# Patient Record
Sex: MALE | Race: WHITE | NOT HISPANIC OR LATINO | Employment: OTHER | URBAN - METROPOLITAN AREA
[De-identification: names, ages, dates, MRNs, and addresses within clinical notes are randomized per-mention and may not be internally consistent; named-entity substitution may affect disease eponyms.]

---

## 2017-04-20 ENCOUNTER — OFFICE VISIT (OUTPATIENT)
Dept: URGENT CARE | Facility: CLINIC | Age: 77
End: 2017-04-20
Payer: MEDICARE

## 2017-04-20 PROCEDURE — G0463 HOSPITAL OUTPT CLINIC VISIT: HCPCS

## 2017-04-20 PROCEDURE — 99213 OFFICE O/P EST LOW 20 MIN: CPT

## 2017-05-18 ENCOUNTER — ALLSCRIPTS OFFICE VISIT (OUTPATIENT)
Dept: OTHER | Facility: OTHER | Age: 77
End: 2017-05-18

## 2017-07-05 ENCOUNTER — ALLSCRIPTS OFFICE VISIT (OUTPATIENT)
Dept: OTHER | Facility: OTHER | Age: 77
End: 2017-07-05

## 2017-07-11 ENCOUNTER — GENERIC CONVERSION - ENCOUNTER (OUTPATIENT)
Dept: OTHER | Facility: OTHER | Age: 77
End: 2017-07-11

## 2017-07-12 ENCOUNTER — ALLSCRIPTS OFFICE VISIT (OUTPATIENT)
Dept: OTHER | Facility: OTHER | Age: 77
End: 2017-07-12

## 2017-11-17 ENCOUNTER — ALLSCRIPTS OFFICE VISIT (OUTPATIENT)
Dept: OTHER | Facility: OTHER | Age: 77
End: 2017-11-17

## 2017-11-17 DIAGNOSIS — E78.5 HYPERLIPIDEMIA: ICD-10-CM

## 2017-11-17 DIAGNOSIS — Z12.5 ENCOUNTER FOR SCREENING FOR MALIGNANT NEOPLASM OF PROSTATE: ICD-10-CM

## 2017-11-17 DIAGNOSIS — I10 ESSENTIAL (PRIMARY) HYPERTENSION: ICD-10-CM

## 2017-11-19 NOTE — PROGRESS NOTES
Assessment    1  Hypertension (401 9) (I10)   2  Generalized osteoarthritis (715 00) (M15 9)   3  Hyperlipidemia (272 4) (E78 5)    Plan  Encounter for prostate cancer screening    · (1) PSA (SCREEN) (Dx V76 44 Screen for Prostate Cancer); Status:Active; Requestedfor:17Nov2017;    · (1) URINALYSIS w URINE C/S REFLEX (will reflex a microscopy if leukocytes, occultblood, or nitrites are not within normal limits); Status:Active; Requested for:17Nov2017;   Encounter for prostate cancer screening, Hyperlipidemia, Hypertension    · (1) CBC/PLT/DIFF; Status:Active; Requested for:17Nov2017;   Generalized osteoarthritis    · Diclofenac Sodium 1 % Transdermal Gel; apply sparingly to affected area(s)twice daily  Hyperlipidemia    · (1) COMPREHENSIVE METABOLIC PANEL; Status:Active; Requested for:17Nov2017;    · (1) LIPID PANEL, FASTING; Status:Active; Requested for:17Nov2017;     Discussion/Summary    Labs orderedmedsin 6 months or as needed  Chief Complaint  follow up for HTN, hyperlipidemia  Patient is here today for follow up of chronic conditions described in HPI  History of Present Illness  here for checkupacute complaintsrefillslabs      Review of Systems   Constitutional: No fever or chills, feels well, no tiredness, no recent weight gain or weight loss  Eyes: No complaints of eye pain, no red eyes, no discharge from eyes, no itchy eyes  ENT: no complaints of earache, no hearing loss, no nosebleeds, no nasal discharge, no sore throat, no hoarseness  Cardiovascular: No complaints of slow heart rate, no fast heart rate, no chest pain, no palpitations, no leg claudication, no lower extremity  Respiratory: No complaints of shortness of breath, no wheezing, no cough, no SOB on exertion, no orthopnea or PND  Gastrointestinal: No complaints of abdominal pain, no constipation, no nausea or vomiting, no diarrhea or bloody stools    Genitourinary: No complaints of dysuria, no incontinence, no hesitancy, no nocturia, no genital lesion, no testicular pain  Musculoskeletal: No complaints of arthralgia, no myalgias, no joint swelling or stiffness, no limb pain or swelling  Integumentary: No complaints of skin rash or skin lesions, no itching, no skin wound, no dry skin  Neurological: No compliants of headache, no confusion, no convulsions, no numbness or tingling, no dizziness or fainting, no limb weakness, no difficulty walking  Psychiatric: Is not suicidal, no sleep disturbances, no anxiety or depression, no change in personality, no emotional problems  Endocrine: No complaints of proptosis, no hot flashes, no muscle weakness, no erectile dysfunction, no deepening of the voice, no feelings of weakness  Hematologic/Lymphatic: No complaints of swollen glands, no swollen glands in the neck, does not bleed easily, no easy bruising  Active Problems  1  Allergic rhinitis (477 9) (J30 9)   2  Benign mole (216 9) (D22 9)   3  Dermatitis, eczematoid (692 9) (L30 9)   4  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)   5  Flu vaccine need (V04 81) (Z23)   6  Folliculitis (165 2) (W95 8)   7  Generalized osteoarthritis (715 00) (M15 9)   8  Hyperlipidemia (272 4) (E78 5)   9  Hypertension (401 9) (I10)    Past Medical History    1  History of Acute medial meniscal tear, left, subsequent encounter (V58 89,836 0) (S83 242D)   2  History of Aftercare following surgery of the musculoskeletal system (V58 78) (Z47 89)   3  History of Angio-edema (995 1) (T78 3XXA)   4  History of Degeneration of cervical intervertebral disc (722 4) (M50 90)   5  History of Derangement Of Medial Meniscus Of Left Knee Due To Old Tear/Injury (717 3)   6  History of Diabetes mellitus screening (V77 1) (Z13 1)   7  History of Erythema Migrans Due To Lyme Disease (695 89)   8  History of Eustachian tube dysfunction (381 81) (H69 80)   9  History of acute sinusitis (V12 69) (Z87 09)   10  History of chronic sinusitis (V12 69) (Z87 09)   11   History of hyperlipidemia (V12 29) (Z86 39)   12  History of hypertension (V12 59) (Z86 79)   13  History of low back pain (V13 59) (Z87 39)   14  History of streptococcal pharyngitis (V12 09) (Z87 09)   15  History of Joint pain, knee (719 46) (M25 569)   16  History of Laceration of thumb (883 0) (S61 019A)   17  History of Left-sided low back pain with left-sided sciatica (724 3) (M54 42)   18  History of Neck pain (723 1) (M54 2)   19  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)   20  History of Otitis externa (380 10) (H60 90)   21  History of Pain in wrist (719 43) (M25 539)   22  History of Pain of finger, unspecified laterality (729 5) (M79 646)   23  History of Primary osteoarthritis of left knee (715 16) (M17 12)   24  History of Spondylosis of cervical region without myelopathy or radiculopathy (721 0)  (M47 812)   25  History of Tick Bites (919 4)   26  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)   27  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)   28  History of Trigger little finger of left hand (727 03) (M65 352)   29  History of Trigger little finger of right hand (727 03) (M65 351)   30  History of Trigger middle finger of right hand (727 03) (M65 331)    The active problems and past medical history were reviewed and updated today  Surgical History  1  History of Arthroscopy Shoulder Left   2  History of Arthroscopy Shoulder Right   3  History of Colonoscopy (Fiberoptic)   4  History of Knee Surgery   5  History of Neuroplasty Decompression Median Nerve At Carpal Tunnel   6  History of Shoulder Surgery   7  History of Tonsillectomy    The surgical history was reviewed and updated today  Family History  Mother    1  Family history of CHD (coronary heart disease)   2  Family history of dementia (V17 2) (Z81 8)  Father    3  Family history of CHD (coronary heart disease)   4  No family history of mental disorder  Paternal Grandmother    11   Family history of lung cancer (V16 1) (Z80 1)    The family history was reviewed and updated today  Social History     · Being A Social Drinker   · Denies alcohol use causing problems   · Exercising Regularly   · Former smoker (V15 82) (N13 464)   · Marital History - Currently    · Occupation: Retired   · Sexually Active  The social history was reviewed and updated today  The social history was reviewed and is unchanged  Current Meds   1  Aspirin 81 MG TABS; TAKE 1 TABLET DAILY; Therapy: (Victorina Wylie) to Recorded   2  Diclofenac Sodium 1 % Transdermal Gel; apply sparingly to affected area(s) twice daily; Therapy: 44PJG0995 to (Evaluate:10Oct2017)  Requested for: 11Aug2017; Last Rx:11Aug2017 Ordered   3  Lisinopril 10 MG Oral Tablet; Take 1 tablet daily; Therapy: 33FNW2621 to (Evaluate:18Mar2018)  Requested for: 69Rmy6381; Last Rx:63Tfg8100 Ordered   4  Vytorin 10-20 MG Oral Tablet; Take 1 tablet daily; Therapy: 52BFP8096 to (Evaluate:18Mar2018)  Requested for: 24Jtr2217; Last Rx:85Ypa3977 Ordered    The medication list was reviewed and updated today  Allergies  1  Demerol TABS   2  Pseudoephedrine HCl TABS   3  Zithromax TABS    Vitals  Vital Signs    Recorded: 69PPT7642 08:28AM   Temperature 98 F, Tympanic   Heart Rate 82   Respiration 16   Systolic 834, RUE, Sitting   Diastolic 80, RUE, Sitting   Height 5 ft 8 in   Weight 153 lb    BMI Calculated 23 26   BSA Calculated 1 82       Physical Exam   Constitutional  General appearance: No acute distress, well appearing and well nourished  Ears, Nose, Mouth, and Throat  Otoscopic examination: Tympanic membrance translucent with normal light reflex  Canals patent without erythema  Oropharynx: Normal with no erythema, edema, exudate or lesions  Pulmonary  Respiratory effort: No increased work of breathing or signs of respiratory distress  Auscultation of lungs: Clear to auscultation, equal breath sounds bilaterally, no wheezes, no rales, no rhonci     Cardiovascular  Auscultation of heart: Normal rate and rhythm, normal S1 and S2, without murmurs  Carotid pulses: Normal    Abdomen  Abdomen: Non-tender, no masses  Liver and spleen: No hepatomegaly or splenomegaly  Lymphatic  Palpation of lymph nodes in neck: No lymphadenopathy  Musculoskeletal  Gait and station: Normal    Digits and nails: Normal without clubbing or cyanosis  Inspection/palpation of joints, bones, and muscles: Normal    Neurologic  Cranial nerves: Cranial nerves 2-12 intact  Sensation: No sensory loss  Psychiatric  Orientation to person, place and time: Normal    Mood and affect: Normal          Health Management  History of Colon cancer screening   COLONOSCOPY; every 5 years; Last 00QRP6865; Next Due: 62AVA3571;  Active    Future Appointments    Date/Time Provider Specialty Site   05/22/2018 08:30 AM Mohini Marte DO Family Medicine [facility]       Signatures   Electronically signed by : Jasmyn Key DO; Nov 17 2017 10:27PM EST                       (Author)

## 2017-11-21 ENCOUNTER — APPOINTMENT (OUTPATIENT)
Dept: LAB | Facility: CLINIC | Age: 77
End: 2017-11-21
Payer: MEDICARE

## 2017-11-21 DIAGNOSIS — I10 ESSENTIAL (PRIMARY) HYPERTENSION: ICD-10-CM

## 2017-11-21 DIAGNOSIS — E78.5 HYPERLIPIDEMIA: ICD-10-CM

## 2017-11-21 DIAGNOSIS — Z12.5 ENCOUNTER FOR SCREENING FOR MALIGNANT NEOPLASM OF PROSTATE: ICD-10-CM

## 2017-11-21 LAB
ALBUMIN SERPL BCP-MCNC: 3.8 G/DL (ref 3.5–5)
ALP SERPL-CCNC: 47 U/L (ref 46–116)
ALT SERPL W P-5'-P-CCNC: 35 U/L (ref 12–78)
ANION GAP SERPL CALCULATED.3IONS-SCNC: 3 MMOL/L (ref 4–13)
AST SERPL W P-5'-P-CCNC: 31 U/L (ref 5–45)
BASOPHILS # BLD AUTO: 0.03 THOUSANDS/ΜL (ref 0–0.1)
BASOPHILS NFR BLD AUTO: 0 % (ref 0–1)
BILIRUB SERPL-MCNC: 0.61 MG/DL (ref 0.2–1)
BUN SERPL-MCNC: 14 MG/DL (ref 5–25)
CALCIUM SERPL-MCNC: 8.8 MG/DL (ref 8.3–10.1)
CHLORIDE SERPL-SCNC: 106 MMOL/L (ref 100–108)
CHOLEST SERPL-MCNC: 159 MG/DL (ref 50–200)
CO2 SERPL-SCNC: 30 MMOL/L (ref 21–32)
CREAT SERPL-MCNC: 0.75 MG/DL (ref 0.6–1.3)
EOSINOPHIL # BLD AUTO: 0.13 THOUSAND/ΜL (ref 0–0.61)
EOSINOPHIL NFR BLD AUTO: 2 % (ref 0–6)
ERYTHROCYTE [DISTWIDTH] IN BLOOD BY AUTOMATED COUNT: 13.1 % (ref 11.6–15.1)
GFR SERPL CREATININE-BSD FRML MDRD: 90 ML/MIN/1.73SQ M
GLUCOSE P FAST SERPL-MCNC: 97 MG/DL (ref 65–99)
HCT VFR BLD AUTO: 47.1 % (ref 36.5–49.3)
HDLC SERPL-MCNC: 60 MG/DL (ref 40–60)
HGB BLD-MCNC: 16 G/DL (ref 12–17)
LDLC SERPL CALC-MCNC: 82 MG/DL (ref 0–100)
LYMPHOCYTES # BLD AUTO: 1.81 THOUSANDS/ΜL (ref 0.6–4.47)
LYMPHOCYTES NFR BLD AUTO: 26 % (ref 14–44)
MCH RBC QN AUTO: 33.7 PG (ref 26.8–34.3)
MCHC RBC AUTO-ENTMCNC: 34 G/DL (ref 31.4–37.4)
MCV RBC AUTO: 99 FL (ref 82–98)
MONOCYTES # BLD AUTO: 0.94 THOUSAND/ΜL (ref 0.17–1.22)
MONOCYTES NFR BLD AUTO: 13 % (ref 4–12)
NEUTROPHILS # BLD AUTO: 4.13 THOUSANDS/ΜL (ref 1.85–7.62)
NEUTS SEG NFR BLD AUTO: 59 % (ref 43–75)
NRBC BLD AUTO-RTO: 0 /100 WBCS
PLATELET # BLD AUTO: 201 THOUSANDS/UL (ref 149–390)
PMV BLD AUTO: 11 FL (ref 8.9–12.7)
POTASSIUM SERPL-SCNC: 4.8 MMOL/L (ref 3.5–5.3)
PROT SERPL-MCNC: 7 G/DL (ref 6.4–8.2)
PSA SERPL-MCNC: 1.3 NG/ML (ref 0–4)
RBC # BLD AUTO: 4.75 MILLION/UL (ref 3.88–5.62)
SODIUM SERPL-SCNC: 139 MMOL/L (ref 136–145)
TRIGL SERPL-MCNC: 87 MG/DL
WBC # BLD AUTO: 7.07 THOUSAND/UL (ref 4.31–10.16)

## 2017-11-21 PROCEDURE — G0103 PSA SCREENING: HCPCS

## 2017-11-21 PROCEDURE — 36415 COLL VENOUS BLD VENIPUNCTURE: CPT

## 2017-11-21 PROCEDURE — 80061 LIPID PANEL: CPT

## 2017-11-21 PROCEDURE — 80053 COMPREHEN METABOLIC PANEL: CPT

## 2017-11-21 PROCEDURE — 85025 COMPLETE CBC W/AUTO DIFF WBC: CPT

## 2017-11-30 ENCOUNTER — GENERIC CONVERSION - ENCOUNTER (OUTPATIENT)
Dept: OTHER | Facility: OTHER | Age: 77
End: 2017-11-30

## 2018-01-12 VITALS
BODY MASS INDEX: 23.19 KG/M2 | WEIGHT: 153 LBS | DIASTOLIC BLOOD PRESSURE: 80 MMHG | HEIGHT: 68 IN | HEART RATE: 82 BPM | TEMPERATURE: 98 F | RESPIRATION RATE: 16 BRPM | SYSTOLIC BLOOD PRESSURE: 148 MMHG

## 2018-01-12 VITALS
BODY MASS INDEX: 23.64 KG/M2 | HEART RATE: 85 BPM | WEIGHT: 156 LBS | DIASTOLIC BLOOD PRESSURE: 84 MMHG | RESPIRATION RATE: 16 BRPM | TEMPERATURE: 97.1 F | SYSTOLIC BLOOD PRESSURE: 136 MMHG | HEIGHT: 68 IN

## 2018-01-12 NOTE — MISCELLANEOUS
Message   Recorded as Task   Date: 07/11/2017 09:10 AM, Created By: Homar Rivers   Task Name: Medical Complaint Callback   Assigned To: Eula Paz   Regarding Patient: Tahmina Blount, Status: In Progress   Comment: Homar Rivers - 11 Jul 2017 9:10 AM     TASK CREATED  Caller: Self; Medical Complaint; [phone] (Home)  PT FINISHED HIS MEDS AND THERE IS NOT CHANGE STILL HAS BODY ITCH CALL PT # [phone] PREDISONE UPSET HIS STOMACH PT GOES TO [facility] Nicholas Reyes - 11 Jul 2017 9:59 AM     TASK REASSIGNED: Previously Assigned To Lin Pennington  If he is putting the mometasone cream on tell him to put her on twice a day  Tell him if he is bothered by itching tell him he can take Benadryl 25 mg over-the-counter every 6 hours  If the rash looks worse or just isn't responding he should come back and let me look at it again Thursday or Friday   Eula Paz - 11 Jul 2017 10:30 AM     TASK EDITED  LMOM to return call  DJB   Eula Paz - 11 Jul 2017 10:30 AM     TASK IN PROGRESS   Eula Paz - 11 Jul 2017 10:56 AM     TASK EDITED  Patient informed  Will consider OV if not improving  DJB        Active Problems    1  Allergic rhinitis (477 9) (J30 9)   2  Benign mole (216 9) (D22 9)   3  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)   4  Folliculitis (705 1) (M59 4)   5  Generalized osteoarthritis (715 00) (M15 9)   6  Hyperlipidemia (272 4) (E78 5)   7  Hypertension (401 9) (I10)    Current Meds   1  Aspirin 81 MG TABS; TAKE 1 TABLET DAILY; Therapy: (Rachael Carrasco) to Recorded   2  Lisinopril 10 MG Oral Tablet; Take 1 tablet daily; Therapy: 52CKW9672 to (Evaluate:16Mme3287)  Requested for: 76HDB8077; Last   Rx:29Mar2017 Ordered   3  Mometasone Furoate 0 1 % External Cream; APPLY ONCE DAILY; Therapy: 47GUL4284 to (Complete:62Whz7152)  Requested for: 27ZNX1867; Last   Rx:76Ofh6785 Ordered   4   PredniSONE 10 MG Oral Tablet; Day 1  50 mg after breakfast   Day 2  40 mg after breakfast   Day 3  30 mg after breakfast   Day 4  20 mg after breakfast   Day 5  10 mg;   Therapy: 93SPM4921 to (Evaluate:36Wqm6991)  Requested for: 68RAZ7009; Last   Rx:66Vcm0552 Ordered   5  Vytorin 10-20 MG Oral Tablet (Ezetimibe-Simvastatin); Take 1 tablet daily; Therapy: 92IMN6831 to ([phone])  Requested for: 06Lzc3921; Last   Rx:68Jrl6021 Ordered    Allergies    1  Demerol TABS   2  Pseudoephedrine HCl TABS   3   Zithromax TABS    Signatures   Electronically signed by : Ilene Bryson, ; Jul 11 2017 10:56AM EST                       (Author)

## 2018-01-12 NOTE — MISCELLANEOUS
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)   5  Encounter for prostate cancer screening (V76 44) (Z12 5)   6  Flu vaccine need (V04 81) (Z23)   7  Folliculitis (173 6) (J05 3)   8  Generalized osteoarthritis (715 00) (M15 9)   9  Hyperlipidemia (272 4) (E78 5)   10  Hypertension (401 9) (I10)    Current Meds   1  Aspirin 81 MG TABS; TAKE 1 TABLET DAILY; Therapy: (Harleyena Earthly) to Recorded   2  Diclofenac Sodium 1 % Transdermal Gel; apply sparingly to affected area(s) twice daily; Therapy: 28WEV4572 to (Evaluate:16Jan2018)  Requested for: 21GVZ2260; Last   Rx:17Nov2017 Ordered   3  Lisinopril 10 MG Oral Tablet; Take 1 tablet daily; Therapy: 92KDB6740 to (Evaluate:18Mar2018)  Requested for: 89Vid3520; Last   Rx:23Alv0597 Ordered   4  Vytorin 10-20 MG Oral Tablet (Ezetimibe-Simvastatin); Take 1 tablet daily; Therapy: 87COH4284 to (Evaluate:18Mar2018)  Requested for: 60Vwc0695; Last   Rx:93Xqv7232 Ordered    Allergies    1  Demerol TABS   2  Pseudoephedrine HCl TABS   3  Zithromax TABS    Signatures   Electronically signed by :  Beth Cheek, ; Nov 30 2017 11:06AM EST                       (Author)

## 2018-01-13 VITALS
HEIGHT: 68 IN | DIASTOLIC BLOOD PRESSURE: 80 MMHG | SYSTOLIC BLOOD PRESSURE: 134 MMHG | HEART RATE: 76 BPM | WEIGHT: 153.5 LBS | BODY MASS INDEX: 23.27 KG/M2

## 2018-01-14 VITALS
HEIGHT: 68 IN | TEMPERATURE: 97 F | WEIGHT: 156 LBS | BODY MASS INDEX: 23.64 KG/M2 | SYSTOLIC BLOOD PRESSURE: 140 MMHG | RESPIRATION RATE: 16 BRPM | HEART RATE: 98 BPM | DIASTOLIC BLOOD PRESSURE: 80 MMHG

## 2018-01-15 NOTE — PROGRESS NOTES
Assessment    1  Encounter for preventive health examination (V70 0) (Z00 00)    Plan  Abnormal EKG    · 1 Oracio Alexander MD (Cardiology) Physician Referral  Consult  Status: Active   Requested for: 87ATS0126  Care Summary provided  : Yes   · VAS SCREENING; Status:Hold For - Scheduling; Requested for:21Nov2016; Health Maintenance    · Call [phone] if: You have any warning signs of skin cancer ; Status:Complete;    Done: 40LOP6350   · Seek Immediate Medical Attention if: You experience a new kind of chest pain (angina)  or pressure ; Status:Complete;   Done: 10UCN3013   · Always use a seat belt and shoulder strap when riding or driving a motor vehicle ;  Status:Complete;   Done: 32RYL7901   · Brush your teeth freq1 and floss at least once a day ; Status:Complete;   Done:  49HEW8028   · Decreasing the stress in your life may help your condition improve ; Status:Complete;    Done: 83YSL2021   · Drink plenty of fluids ; Status:Complete;   Done: 67PUA6872   · Take steps to avoid hypothermia ; Status:Complete;   Done: 06UAA0135   · There are many exercise options for seniors ; Status:Complete;   Done: 68BVB6183   · There ways to avoid falling ; Status:Complete;   Done: 72WPO2492   · Use a sun block product with an SPF of 15 or more ; Status:Complete;   Done:  52GLV1861   · We encourage all of our patients to exercise regularly  30 minutes of exercise or physical  activity five or more days a week is recommended for children and adults ;  Status:Complete;   Done: 18SUQ8920   · We recommend routine visits to a dentist ; Status:Complete;   Done: 47COI3402   · We recommend that you bring your body mass index down to 26 ; Status:Complete;    Done: 68OTT8566   · We recommend that you follow the "Mediterranean diet "; Status:Complete;   Done:  63TDM0074  Need for pneumococcal vaccination    · Prevnar 13 Intramuscular Suspension; INJECT 0 5  ML Intramuscular;  To Be  Done: 67IWQ9644    Discussion/Summary    [age]year-old man here for subsequent annual wellness visit  He is had all the usual preventive services  Examination including prostate exam performed today no new findings  I am giving him Prevnar 15  I'm sending him for a vascular scan  His cardiogram had an abnormality and I'm referring him to the cardiologist for evaluation  We'll get together in 6 months for checkup 12 months annual wellness visit  No change in medications and overall management  Impression: Subsequent Annual Wellness Visit  Cardiovascular screening and counseling: the risks and benefits of screening were discussed, screening is current and EKG recommended  Diabetes screening and counseling: the risks and benefits of screening were discussed and screening is current  Colorectal cancer screening and counseling: the risks and benefits of screening were discussed and screening is current  Prostate cancer screening and counseling: the risks and benefits of screening were discussed and screening is current  Osteoporosis screening and counseling: the risks and benefits of screening were discussed and screening not indicated  Abdominal aortic aneurysm screening and counseling: the risks and benefits of screening were discussed and screening US recommended  Glaucoma screening and counseling: the risks and benefits of screening were discussed and screening is current  HIV screening and counseling: the risks and benefits of screening were discussed and screening not indicated   Immunizations: the risks and benefits of influenza vaccination were discussed with the patient, the patient declines the influenza vaccination, the risks and benefits of pneumococcal vaccination were discussed with the patient, pneumococcal vaccine due today, hepatitis B prevention counseling was provided, hepatitis B vaccination series is not indicated at this time due to the patient's low risk of neto the disease, the risks and benefits of the Zostavax vaccine were discussed with the patient, Zostavax vaccination status is unknown, the risks and benefits of the Tdap vaccine were discussed with the patient and Tdap vaccination up to date  Advance Directive Planning: complete and up to date  Patient Discussion: plan discussed with the patient, follow-up visit needed in one year  Chief Complaint  Subsequent Medicare Wellness Exam      Advance Directives  Advance Directive [place]:   YES - Patient has an advance health care directive  The patient has a living will located   Durable Power of  For Healthcare:    Name: Juliana Stanton   Relationship: Spouse   Capacity/Competence: This patient has full decision making capacity for discussion of advance care planning  The provider spent 1 minutes discussing Advance Directives  History of Present Illness  Welcome to Medicare and Wellness Visits: The patient is being seen for the subsequent annual wellness visit  Medicare Screening and Risk Factors   Hospitalizations: no previous hospitalizations  Once per lifetime medicare screening tests: 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,OLD ANTEROSEPTAL INFARCT) and AAA screening US (CT 7/09 no AAA)  Medicare Screening Tests Risk Questions   Abdominal aortic aneurysm risk assessment: none indicated  Osteoporosis risk assessment: none indicated  HIV risk assessment: none indicated  Drug and Alcohol Use: The patient has never smoked cigarettes  The patient reports occasional alcohol use  He has never used illicit drugs  Diet and Physical Activity: Current diet includes well balanced meals  He exercises 7 times per week  Exercise: bcycle 7 hours per week  Mood Disorder and Cognitive Impairment Screening: Anxiety screening none  He denies feeling down, depressed, or hopeless over the past two weeks  He denies feeling little interest or pleasure in doing things over the past two weeks     Cognitive impairment screening: denies difficulty learning/retaining new information, denies difficulty handling complex tasks, denies difficulty with reasoning, denies difficulty with spatial ability and orientation, denies difficulty with language and denies difficulty with behavior  (none)   Functional Ability/Level of Safety: Hearing is significantly decreased and a hearing aid is used  The patient is currently able to do activities of daily living without limitations, able to do instrumental activities of daily living without limitations, able to participate in social activities without limitations and able to drive without limitations  Activities of daily living details: does not need help using the phone, no transportation help needed, does not need help shopping, no meal preparation help needed, does not need help doing housework, does not need help doing laundry, does not need help managing medications and does not need help managing money  Fall risk factors:  no polypharmacy, no alcohol use, no mobility impairment, no antidepressant use, no deconditioning, no postural hypotension, no sedative use, no visual impairment, no urinary incontinence, no antihypertensive use, no cognitive impairment and up and go test was normal  Home safety risk factors:  no unfamiliar surroundings, no loose rugs, no poor household lighting, no uneven floors, no household clutter, grab bars in the bathroom and handrails on the stairs  Advance Directives: Advance directives: living will, durable power of  for health care directives and advance directives  end of life decisions were reviewed with the patient and I agree with the patient's decisions  Co-Managers and Medical Equipment/Suppliers: See Patient Care Team   Reviewed Updated [facility]:   Last Medicare Wellness Visit Information was reviewed, patient interviewed, no change since last AWV        Patient Care Team    Care Team Member Role Specialty Office Number   Clarisa Farrar MD  Orthopedic Surgery [phone]   Oj Arreola Management [phone]     Review of Systems    Constitutional: negative  Eyes: negative  ENT: negative  Cardiovascular: negative  Respiratory: negative  Gastrointestinal: negative  Genitourinary: negative  Musculoskeletal: negative  Integumentary and Breasts: negative  Neurological: negative  Psychiatric: negative  Endocrine: negative  Hematologic and Lymphatic: negative  Active Problems    1  Allergic rhinitis (477 9) (J30 9)   2  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)   3  Generalized osteoarthritis (715 00) (M15 9)   4  Hyperlipidemia (272 4) (E78 5)   5   Hypertension (401 9) (I10)    Past Medical History    · History of Acute medial meniscal tear, left, subsequent encounter (V58 89,836 0)  (V16 142D)   · History of Aftercare following surgery of the musculoskeletal system (V58 78) (Z47 89)   · History of Angio-edema (995 1) (T78 3XXA)   · History of Degeneration of cervical intervertebral disc (722 4) (M50 90)   · History of Derangement Of Medial Meniscus Of Left Knee Due To Old Tear/Injury (717 3)   · History of Diabetes mellitus screening (V77 1) (Z13 1)   · History of Erythema Migrans Due To Lyme Disease (695 89)   · History of Eustachian tube dysfunction (381 81) (H69 80)   · History of acute sinusitis (V12 69) (Z87 09)   · History of chronic sinusitis (V12 69) (Z87 09)   · History of hyperlipidemia (V12 29) (Z86 39)   · History of hypertension (V12 59) (Z86 79)   · History of low back pain (V13 59) (Z87 39)   · History of streptococcal pharyngitis (V12 09) (Z87 09)   · History of Joint pain, knee (719 46) (M25 569)   · History of Laceration of thumb (883 0) (S61 019A)   · History of Left-sided low back pain with left-sided sciatica (724 3) (M54 42)   · History of Neck pain (723 1) (M54 2)   · History of Need for 23-polyvalent pneumococcal polysaccharide vaccine (V03 82) (Z23)   · History of Needs flu shot (V04 81) (Z23)   · 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)   · History of Otitis externa (380 10) (H60 90)   · History of Pain in wrist (719 43) (M25 539)   · History of Pain of finger, unspecified laterality (729 5) (M79 646)   · History of Primary osteoarthritis of left knee (715 16) (M17 12)   · History of Prostate cancer screening (V76 44) (Z12 5)   · History of Spondylosis of cervical region without myelopathy or radiculopathy (721 0)  (M47 812)   · History of Tick Bites (919 4)   · 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)   · 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)   · History of Trigger little finger of left hand (727 03) (M65 352)   · History of Trigger little finger of right hand (727 03) (M65 351)   · History of Trigger middle finger of right hand (727 03) (M65 331)    The active problems and past medical history were reviewed and updated today  Surgical History    · History of Arthroscopy Shoulder Left   · History of Arthroscopy Shoulder Right   · History of Colonoscopy (Fiberoptic)   · History of Knee Surgery   · History of Neuroplasty Decompression Median Nerve At Carpal Tunnel   · History of Shoulder Surgery   · History of Tonsillectomy    The surgical history was reviewed and updated today  Family History  Mother    · Family history of CHD (coronary heart disease)   · Family history of dementia (V17 2) (Z80 11)  Father    · Family history of CHD (coronary heart disease)  Paternal Grandmother    · Family history of lung cancer (V16 1) (Z80 1)    The family history was reviewed and updated today  Social History    · Being A Social Drinker   · Exercising Regularly   · Former smoker (V15 82) (C82 768)   · Marital History - Currently    · Occupation: Retired   · Sexually Active  The social history was reviewed and updated today  The social history was reviewed and is unchanged  Current Meds   1  Aspirin 81 MG TABS; TAKE 1 TABLET DAILY; Therapy: (Aranza Boyd) to Recorded   2  Lisinopril 10 MG Oral Tablet;  Take 1 tablet daily; Therapy: 27LSZ1305 to (Evaluate:06Jan2017)  Requested for: 63DUZ4614; Last   Rx:45Xgn7888 Ordered   3  Vytorin 10-20 MG Oral Tablet; Take 1 tablet daily; Therapy: 79NUA8366 to (Evaluate:06Jan2017)  Requested for: 63TLP3260; Last   Rx:92Idl7739 Ordered    The medication list was reviewed and updated today  Allergies    1  Demerol TABS   2  Pseudoephedrine HCl TABS   3  Zithromax TABS    Immunizations   ** Printed in Appendix #1 below  Vitals  Signs    Heart Rate: 84  Systolic: 583, LUE, Sitting  Diastolic: 80, LUE, Sitting  Height: 5 ft 7 in  Weight: 154 lb   BMI Calculated: 24 12  BSA Calculated: 1 81    Physical Exam    Constitutional   General appearance: Abnormal   acutely ill and appearance reflects stated age  Head and Face   Head and face: Normal     Palpation of the face and sinuses: No sinus tenderness  Eyes   Conjunctiva and lids: No erythema, swelling or discharge  Pupils and irises: Equal, round, reactive to light  Ophthalmoscopic examination: Normal fundi and optic discs  Ears, Nose, Mouth, and Throat   External inspection of ears and nose: Normal     Otoscopic examination: Tympanic membranes translucent with normal light reflex  Canals patent without erythema  Hearing: Abnormal     Nasal mucosa, septum, and turbinates: Normal without edema or erythema  Lips, teeth, and gums: Normal, good dentition  Oropharynx: Normal with no erythema, edema, exudate or lesions  Neck   Neck: Supple, symmetric, trachea midline, no masses  Thyroid: Normal, no thyromegaly  Pulmonary   Auscultation of lungs: Clear to auscultation  Cardiovascular   Auscultation of heart: Normal rate and rhythm, normal S1 and S2, no murmurs  Carotid pulses: 2+ bilaterally  Abdominal aorta: Normal     Femoral pulses: 2+ bilaterally  Pedal pulses: 2+ bilaterally      Peripheral vascular exam: Normal     Examination of extremities for edema and/or varicosities: Normal  Abdomen   Abdomen: Non-tender, no masses  Liver and spleen: No hepatomegaly or splenomegaly  Examination for hernias: No hernias appreciated  Anus, perineum, and rectum: Normal sphincter tone, no masses, no prolapse  Genitourinary   Scrotal contents: Normal testes, no masses  Penis: Normal, no lesions  Digital rectal exam of prostate: Abnormal   The prostate was enlarged, but had no palpable nodules, was nontender and was not fluctuant  Lymphatic   Palpation of lymph nodes in neck: No lymphadenopathy  Palpation of lymph nodes in axillae: No lymphadenopathy  Palpation of lymph nodes in groin: No lymphadenopathy  Palpation of lymph nodes in other areas: No lymphadenopathy  Musculoskeletal   Gait and station: Normal     Inspection/palpation of digits and nails: Normal without clubbing or cyanosis  Inspection/palpation of joints, bones, and muscles: Normal     Range of motion: Normal     Stability: Normal     Muscle strength/tone: Normal     Skin   Skin and subcutaneous tissue: Normal without rashes or lesions  Palpation of skin and subcutaneous tissue: Normal turgor  Neurologic   Cranial nerves: Cranial nerves 2-12 intact  Cortical function: Normal mental status  Reflexes: 2+ and symmetric  Sensation: No sensory loss  Coordination: Normal finger to nose and heel to shin  Psychiatric   Judgment and insight: Normal     Orientation to person, place and time: Normal     Recent and remote memory: Intact  Mood and affect: Normal        Procedure    Procedure: Visual Acuity Test   Patient refused to do visual screening today  He does not wear glasses or contacts        Signatures   Electronically signed by : SILVANO Diane ; 2016  8:28AM EST                       (Author)    Appendix #1     Patient: Mickie Alvarez ; : 1940; MRN: [id number]      1 2 3 4 5    Influenza  20-Sep-2012 07-Oct-2013 30-Sep-2014 22-Sep-2015 07-Sep-2016    PPSV  19-Sep-2011 17-May-2016       Td/DT  25-May-2005 20-May-2015

## 2018-01-16 NOTE — MISCELLANEOUS
Message   Recorded as Task   Date: 05/05/2016 08:46 AM, Created By: Yumiko Flynn   Task Name: Follow Up   Assigned To: Siva Stephnes   Regarding Patient: Homar Chaney, Status: Active   Comment:    Nicholas Clay - 05 May 2016 8:46 AM     TASK CREATED  Isac Mendes it's too soon to expect much of a change  Tell him if the Zithromax really upsets his stomach stopped taking it and I'll send something else to [facility],April - 05 May 2016 9:01 AM     TASK EDITED  [phone] cell   [place],April - 05 May 2016 9:04 AM     TASK EDITED  Patient is requesting 2x daily of Cefuroxime instead of taking Zithromax if possible? Delehomero Georges - 05 May 2016 9:29 AM     TASK EDITED  Yu Market the message telli him to soon to expect much change tell him to stop the Zithromax tell him I am calling a prescription for cefuroxime to [facility],April - 05 May 2016 10:20 AM     TASK EDITED  patient was informed to [facility] the script at [facility]         Active Problems    1  Allergic rhinitis (477 9) (J30 9)   2  Eustachian tube dysfunction (381 81) (H69 80)   3  Generalized osteoarthritis (715 00) (M15 9)   4  Hyperlipidemia (272 4) (E78 5)   5  Hypertension (401 9) (I10)   6  Pharyngitis, streptococcal (034 0) (J02 0)    Current Meds   1  Aspirin 81 MG TABS; TAKE 1 TABLET DAILY; Therapy: (Tito Romero) to Recorded   2  Cefuroxime Axetil 500 MG Oral Tablet; TAKE 1 TABLET TWICE DAILY; Therapy: 98KXV6769 to (Complete:15Tby0520)  Requested for: 80QYU7388; Last   CQ:[id number] Ordered   3  Diclofenac Sodium 1 % Transdermal Gel; Apply sparingly twice daily; Therapy: 75WID7903 to (Last FS:08DWK7374)  Requested for: 36AFE9680 Ordered   4  Lisinopril 10 MG Oral Tablet; Take 1 tablet daily; Therapy: 67PYJ5545 to (Evaluate:37Jdx9054)  Requested for: 95KGB7542; Last   Rx:50Kor4738 Ordered   5  Vytorin 10-20 MG Oral Tablet; Take 1 tablet daily; Therapy: 84MFL0954 to (Evaluate:11Jun2016)  Requested for: 36UET6379;  Last   Rx:90Sug0022 Ordered    Allergies    1  Demerol TABS   2  Pseudoephedrine HCl TABS   3   Zithromax TABS    Signatures   Electronically signed by : Citallli Torres, ; May  5 2016 10:20AM EST                       (Author)

## 2018-03-07 NOTE — PROGRESS NOTES
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)   4  Generalized osteoarthritis (715 00) (M15 9)   5  Hyperlipidemia (272 4) (E78 5)   6  Hypertension (401 9) (I10)   7  Need for pneumococcal vaccination (V03 82) (Z23)   8  Need for revaccination (V05 9) (Z23)    Immunizations  Influenza --- Monika Farley: 60-Pmu-7106Kn [place]: 69-Fby-1661Tqsqdu [place]: 30-Sep-2014; Series4:  22-Sep-2015; Series5: 07-Sep-2016   PCV --- Series1: 21-Nov-2016   PPSV --- Monika Farley: 19-Sep-2011; Mark Tay: 17-May-2016   Td/DT --- Series1: 25-May-2005; Series2: 20-May-2015     Current Meds   1  Aspirin 81 MG TABS; TAKE 1 TABLET DAILY   2  Lisinopril 10 MG Oral Tablet; Take 1 tablet daily   3  Vytorin 10-20 MG Oral Tablet; Take 1 tablet daily    Allergies    1  Demerol TABS   2  Pseudoephedrine HCl TABS   3  Zithromax TABS    Plan    1  RVAC-Tenivac 5-2 LFU Intramuscular Injectable    Education  Education Items with no Session   RVAC-Tenivac 5-2 LFU Intramuscular Injectable;  Provided: 22GPT6141 08:32AM;  Counselor: Alejandra Yoder; Future Appointments    Date/Time Provider Specialty Site   05/18/2017 10:30 AM SILVANO Gonzalez  [place] [address]   12/12/2017 03:30 PM SILVANO Gonzalez   Family Medicine [facility]     Signatures   Electronically signed by : SILVANO Tidwell ; Dec 21 2016  3:51PM EST                       (Author)

## 2018-04-26 ENCOUNTER — OFFICE VISIT (OUTPATIENT)
Dept: FAMILY MEDICINE CLINIC | Facility: CLINIC | Age: 78
End: 2018-04-26
Payer: MEDICARE

## 2018-04-26 VITALS
DIASTOLIC BLOOD PRESSURE: 80 MMHG | SYSTOLIC BLOOD PRESSURE: 114 MMHG | HEART RATE: 72 BPM | BODY MASS INDEX: 24.33 KG/M2 | HEIGHT: 67 IN | WEIGHT: 155 LBS

## 2018-04-26 DIAGNOSIS — M54.5 ACUTE BILATERAL LOW BACK PAIN, WITH SCIATICA PRESENCE UNSPECIFIED: Primary | ICD-10-CM

## 2018-04-26 PROBLEM — L30.9 DERMATITIS, ECZEMATOID: Status: ACTIVE | Noted: 2017-07-12

## 2018-04-26 PROCEDURE — 99213 OFFICE O/P EST LOW 20 MIN: CPT | Performed by: FAMILY MEDICINE

## 2018-04-26 RX ORDER — PREDNISONE 10 MG/1
TABLET ORAL
Qty: 21 TABLET | Refills: 0 | Status: SHIPPED | OUTPATIENT
Start: 2018-04-26 | End: 2019-05-28 | Stop reason: ALTCHOICE

## 2018-04-26 RX ORDER — METHYLPREDNISOLONE ACETATE 40 MG/ML
40 INJECTION, SUSPENSION INTRA-ARTICULAR; INTRALESIONAL; INTRAMUSCULAR; SOFT TISSUE ONCE
Status: DISCONTINUED | OUTPATIENT
Start: 2018-04-26 | End: 2020-10-20

## 2018-04-26 RX ORDER — EZETIMIBE AND SIMVASTATIN 10; 20 MG/1; MG/1
1 TABLET ORAL DAILY
COMMUNITY
Start: 2011-10-27 | End: 2018-06-29 | Stop reason: SDUPTHER

## 2018-04-26 RX ORDER — CYCLOBENZAPRINE HCL 10 MG
10 TABLET ORAL 2 TIMES DAILY
Qty: 20 TABLET | Refills: 0 | Status: SHIPPED | OUTPATIENT
Start: 2018-04-26 | End: 2019-11-14

## 2018-04-26 RX ORDER — LISINOPRIL 10 MG/1
1 TABLET ORAL DAILY
COMMUNITY
Start: 2011-07-11 | End: 2018-06-13 | Stop reason: SDUPTHER

## 2018-04-26 NOTE — PROGRESS NOTES
Assessment/Plan:     Diagnoses and all orders for this visit:    Acute bilateral low back pain, with sciatica presence unspecified  -     predniSONE 10 mg tablet; 6 tabs on Day 1,5 tabs on Day 2,4 tabs on Day 3,3 tabs on Day 4,2 tabs on  Day 5 And 1 tab on Day 6  -     cyclobenzaprine (FLEXERIL) 10 mg tablet; Take 1 tablet (10 mg total) by mouth 2 (two) times a day  -     methylPREDNISolone acetate (DEPO-MEDROL) injection 40 mg; Inject 1 mL (40 mg total) into the shoulder, thigh, or buttocks once               Subjective:     Chief Complaint   Patient presents with    Back Pain     left side right above pelvis - started yesterday    Spasms     muscle spasms all the time        Patient ID: Virginia Sifuentes is a 78 y o  male  Here for few days of bilateral back pain  Mostly worse on left than right  No radiation down legs  Started after doing yard  Has hadard episodes in the past very similar the upper back        The following portions of the patient's history were reviewed and updated as appropriate: allergies, current medications, past family history, past medical history, past social history, past surgical history and problem list     Review of Systems   Constitutional: Negative  HENT: Negative  Eyes: Negative  Respiratory: Negative  Cardiovascular: Negative  Gastrointestinal: Negative  Endocrine: Negative  Genitourinary: Negative  Musculoskeletal: Positive for arthralgias and back pain  Skin: Negative  Allergic/Immunologic: Negative  Neurological: Negative  Hematological: Negative  Psychiatric/Behavioral: Negative  All other systems reviewed and are negative  Objective:    Vitals:    04/26/18 1134   BP: 114/80   BP Location: Right arm   Patient Position: Standing   Cuff Size: Standard   Pulse: 72   Weight: 70 3 kg (155 lb)   Height: 5' 7" (1 702 m)          Physical Exam   Constitutional: He is oriented to person, place, and time   He appears well-developed and well-nourished  No distress  HENT:   Head: Normocephalic  Right Ear: External ear normal    Left Ear: External ear normal    Nose: Nose normal    Mouth/Throat: Oropharynx is clear and moist    Eyes: Conjunctivae and EOM are normal  Pupils are equal, round, and reactive to light  Right eye exhibits no discharge  Left eye exhibits no discharge  Neck: Normal range of motion  Cardiovascular: Normal rate, regular rhythm and normal heart sounds  Pulmonary/Chest: Effort normal and breath sounds normal    Abdominal: Soft  Bowel sounds are normal  He exhibits no distension  There is no tenderness  Musculoskeletal: Normal range of motion  Very tight muscular spasms b/l   Neurological: He is alert and oriented to person, place, and time  No cranial nerve deficit  Skin: Skin is warm and dry  No rash noted  Psychiatric: He has a normal mood and affect  His behavior is normal  Judgment and thought content normal    Nursing note and vitals reviewed

## 2018-05-22 ENCOUNTER — OFFICE VISIT (OUTPATIENT)
Dept: FAMILY MEDICINE CLINIC | Facility: CLINIC | Age: 78
End: 2018-05-22
Payer: MEDICARE

## 2018-05-22 VITALS
HEART RATE: 85 BPM | HEIGHT: 67 IN | OXYGEN SATURATION: 95 % | DIASTOLIC BLOOD PRESSURE: 84 MMHG | SYSTOLIC BLOOD PRESSURE: 138 MMHG | WEIGHT: 155 LBS | BODY MASS INDEX: 24.33 KG/M2

## 2018-05-22 DIAGNOSIS — R39.11 BENIGN PROSTATIC HYPERPLASIA WITH URINARY HESITANCY: Primary | ICD-10-CM

## 2018-05-22 DIAGNOSIS — N40.1 BENIGN PROSTATIC HYPERPLASIA WITH URINARY HESITANCY: Primary | ICD-10-CM

## 2018-05-22 DIAGNOSIS — E78.5 HYPERLIPIDEMIA, UNSPECIFIED HYPERLIPIDEMIA TYPE: ICD-10-CM

## 2018-05-22 DIAGNOSIS — M54.32 SCIATICA OF LEFT SIDE: ICD-10-CM

## 2018-05-22 DIAGNOSIS — I10 ESSENTIAL HYPERTENSION: ICD-10-CM

## 2018-05-22 PROCEDURE — 99214 OFFICE O/P EST MOD 30 MIN: CPT | Performed by: FAMILY MEDICINE

## 2018-05-22 RX ORDER — TAMSULOSIN HYDROCHLORIDE 0.4 MG/1
0.4 CAPSULE ORAL
Qty: 90 CAPSULE | Refills: 1 | Status: SHIPPED | OUTPATIENT
Start: 2018-05-22 | End: 2018-11-08

## 2018-05-22 NOTE — PROGRESS NOTES
Assessment/Plan:     Diagnoses and all orders for this visit:    Benign prostatic hyperplasia with urinary hesitancy  -     tamsulosin (FLOMAX) 0 4 mg; Take 1 capsule (0 4 mg total) by mouth daily with dinner    Essential hypertension    Hyperlipidemia, unspecified hyperlipidemia type    Sciatica of left side  -     Ambulatory referral to Physical Therapy; Future      will start Flomax for his urinary issues  Continue other medications  Offered physical therapy for his continuing back pain  Otherwise that he is doing well and he can follow up in 6 months or as needed      Subjective:     Chief Complaint   Patient presents with    Follow-up     6 month hypertension check up         Patient ID: Brenna Serrano is a 78 y o  male  Patient is here today for six-month checkup  States having issues with urination in the middle of the night states during the day he feels his stream is strong however at night he is very hesitant  Back pain is much better  He is using a  brace when he is doing strenuous activity that is helping a lot        The following portions of the patient's history were reviewed and updated as appropriate: allergies, current medications, past family history, past medical history, past social history, past surgical history and problem list     Review of Systems   Constitutional: Negative  HENT: Negative  Eyes: Negative  Respiratory: Negative  Cardiovascular: Negative  Gastrointestinal: Negative  Endocrine: Negative  Genitourinary: Negative  Musculoskeletal: Negative  Skin: Negative  Allergic/Immunologic: Negative  Neurological: Negative  Hematological: Negative  Psychiatric/Behavioral: Negative  All other systems reviewed and are negative          Objective:    Vitals:    05/22/18 0818   BP: 138/84   BP Location: Left arm   Patient Position: Sitting   Cuff Size: Standard   Pulse: 85   SpO2: 95%   Weight: 70 3 kg (155 lb)   Height: 5' 7" (1 702 m) Physical Exam   Constitutional: He is oriented to person, place, and time  He appears well-developed and well-nourished  No distress  HENT:   Head: Normocephalic  Right Ear: External ear normal    Left Ear: External ear normal    Nose: Nose normal    Mouth/Throat: Oropharynx is clear and moist    Eyes: Conjunctivae and EOM are normal  Pupils are equal, round, and reactive to light  Right eye exhibits no discharge  Left eye exhibits no discharge  Neck: Normal range of motion  Cardiovascular: Normal rate, regular rhythm and normal heart sounds  Pulmonary/Chest: Effort normal and breath sounds normal    Abdominal: Soft  Bowel sounds are normal  He exhibits no distension  There is no tenderness  Musculoskeletal: Normal range of motion  Neurological: He is alert and oriented to person, place, and time  No cranial nerve deficit  Skin: Skin is warm and dry  No rash noted  Psychiatric: He has a normal mood and affect  His behavior is normal  Judgment and thought content normal    Nursing note and vitals reviewed

## 2018-05-23 ENCOUNTER — EVALUATION (OUTPATIENT)
Dept: PHYSICAL THERAPY | Facility: CLINIC | Age: 78
End: 2018-05-23
Payer: MEDICARE

## 2018-05-23 DIAGNOSIS — M54.32 SCIATICA OF LEFT SIDE: ICD-10-CM

## 2018-05-23 DIAGNOSIS — M54.42 ACUTE LEFT-SIDED LOW BACK PAIN WITH LEFT-SIDED SCIATICA: Primary | ICD-10-CM

## 2018-05-23 PROCEDURE — G8990 OTHER PT/OT CURRENT STATUS: HCPCS | Performed by: PHYSICAL THERAPIST

## 2018-05-23 PROCEDURE — G8991 OTHER PT/OT GOAL STATUS: HCPCS | Performed by: PHYSICAL THERAPIST

## 2018-05-23 PROCEDURE — 97161 PT EVAL LOW COMPLEX 20 MIN: CPT | Performed by: PHYSICAL THERAPIST

## 2018-05-23 PROCEDURE — 97110 THERAPEUTIC EXERCISES: CPT | Performed by: PHYSICAL THERAPIST

## 2018-05-23 NOTE — PROGRESS NOTES
PT Evaluation     Today's date: 2018  Patient name: Joe Burkett  : 1940  MRN: [id number]  Referring provider: Lauryn Landrum DO  Dx:   Encounter Diagnosis     ICD-10-CM    1  Acute left-sided low back pain with left-sided sciatica M54 42    2  Sciatica of left side M54 32 Ambulatory referral to Physical Therapy                  Assessment  Impairments: abnormal muscle tone, abnormal or restricted ROM, activity intolerance and pain with function    Assessment details: Patient is a 78y o  year old male presenting to physical therapy with left sided low back pain  Patient presents with pain, decreased strength, decreased ROM, and decreased tolerance to activity  Patient would benefit from skilled physical therapy services to address these impairments and to maximize function  Thank you for the referral     Understanding of Dx/Px/POC: excellent  Goals  Impairment Goals:  1 ) Pt will have decreased sx during normal day 50% in 3-4 weeks  2 ) Pt will have improved active lumbar range of motion by 10 degrees in 3-4 weeks  3 ) Pt will have improved hip strength throughout by 1/2 MMT in 4-6 weeks  4 ) Pt will have decreased tenderness to palpation to left QL/lumbar paraspinal musculature by 50% in 4-6 weeks  Functional Goals:  1 ) Pt will be independent in their home exercise program in 1 week  2 ) Pt will be able to complete all ADL's without pain in 6 weeks  3 ) Pt will have an improved FOTO score of 95/100 in 6 weeks  4 ) Pt will present proper lifting mechanics carrying a 50 lb object in 4-6 weeks      Plan  Patient would benefit from: skilled PT  Planned modality interventions: TENS  Planned therapy interventions: joint mobilization, manual therapy, patient education, postural training, strengthening, stretching, work reintegration, home exercise program, therapeutic exercise, body mechanics training and neuromuscular re-education  Frequency: 1x week  Duration in weeks: 6  Treatment plan discussed with: patient  Plan details: POC ends: 18        Subjective Evaluation    History of Present Illness  Mechanism of injury: Pt reports he was walking down a flight of steps carrying a 60 lb pounds, he skipped the last step, landing on the left leg with a jarring motion  He went to doctor Rosanna, got a shot and been taking a steroid dose pack with some relief  Pt is currently retired, he does a lot of active work in the garden still  Pt reports years ago the pain traveled down his left leg, this episode of pain has not reproduced any radiating pain  Pt denies bowel or bladder changes, unrelenting night pain or changes in weight without intent  Aggs: bending, stooping down  Eases: medications, heating pad  Pt is currently doing everything he wants but just gets some discomfort at times during his routine  Pain  Current pain ratin  At best pain ratin  At worst pain rating: 3  Quality: dull ache    Patient Goals  Patient goals for therapy: decreased pain          Objective     Special Questions  Negative for night pain, disturbed sleep, bladder dysfunction, bowel dysfunction and saddle (S4) numbness    Static Posture   General Observations  Symmetrical weight bearing  Lumbar Spine   Flattened  Postural Observations  Seated posture: fair  Standing posture: fair  Correction of posture: has no consistent effect        Palpation   Left   Tenderness of the erector spinae, lumbar paraspinals and quadratus lumborum  Tenderness     Lumbar Spine  Tenderness in the spinous process (L2-5)       Neurological Testing     Sensation     Lumbar   Left   Intact: light touch    Right   Intact: light touch    Reflexes   Left   Patellar (L4): trace (1+)  Achilles (S1): trace (1+)    Right   Patellar (L4): trace (1+)  Achilles (S1): trace (1+)    Active Range of Motion     Lumbar   Flexion: Active lumbar flexion: Min  with pain  Extension: Active lumbar extension: Max  with pain  Left lateral flexion: Active left lumbar lateral flexion: Mod    Right lateral flexion: Active right lumbar lateral flexion: Mod    Left rotation: Active left lumbar rotation: Mod    Right rotation: Active right lumbar rotation: Mod      Passive Range of Motion     Additional Passive Range of Motion Details  Hypomobility noted throughout Lumbar spine, no pain noted  Strength/Myotome Testing     Left Hip   Planes of Motion   Flexion: 4  Extension: 4  Abduction: 4    Right Hip   Planes of Motion   Flexion: 4  Extension: 4  Abduction: 4    Left Knee   Flexion: 4  Extension: 4    Right Knee   Flexion: 4  Extension: 4    Left Ankle/Foot   Dorsiflexion: 4+  Plantar flexion: 4+    Right Ankle/Foot   Dorsiflexion: 4+  Plantar flexion: 4+    Tests       Thoracic   Negative slump  Lumbar   Negative slump  Left   Negative quadrant  Additional Tests Details  HS 90/90: lacking 7 degrees on R LE, 5 degrees on L  Piriformis tightness B    Functional Assessment     Comments  During functional squat and lift from ground pt with poor body mechanics, kept object out away from body, lifted with his low back and rotated his upper body separate from lower body  Educated pt on lifting mechanics to take pressure off of his low back and also tips on rotating body as a whole to decrease possible injury in the future        Flowsheet Rows      Most Recent Value   PT/OT G-Codes   Current Score  94   Projected Score  95   FOTO information reviewed  Yes   Assessment Type  Evaluation   G code set  Other PT/OT Primary   Other PT Primary Current Status ()  CI   Other PT Primary Goal Status ()  CI        Precautions: HTN, cervical spine disc degeneration    Daily Treatment Diary       Manuals             Lumbar PA Grade 3-4                                                    Exercise Diary              Supine piriformis stretch nv            SKTC nv            SL QL stretch 5x15''            Supine HS stretch 10x10'' Modalities

## 2018-05-30 ENCOUNTER — OFFICE VISIT (OUTPATIENT)
Dept: PHYSICAL THERAPY | Facility: CLINIC | Age: 78
End: 2018-05-30
Payer: MEDICARE

## 2018-05-30 DIAGNOSIS — M54.42 ACUTE LEFT-SIDED LOW BACK PAIN WITH LEFT-SIDED SCIATICA: Primary | ICD-10-CM

## 2018-05-30 DIAGNOSIS — M54.32 SCIATICA OF LEFT SIDE: ICD-10-CM

## 2018-05-30 PROCEDURE — 97110 THERAPEUTIC EXERCISES: CPT | Performed by: PHYSICAL THERAPIST

## 2018-05-30 PROCEDURE — 97140 MANUAL THERAPY 1/> REGIONS: CPT | Performed by: PHYSICAL THERAPIST

## 2018-05-30 NOTE — PROGRESS NOTES
Daily Note     Today's date: 2018  Patient name: Oj Jeter  : 1940  MRN: [id number]  Referring provider: Anthony Michael DO  Dx:   Encounter Diagnosis     ICD-10-CM    1  Acute left-sided low back pain with left-sided sciatica M54 42    2  Sciatica of left side M54 32                   Subjective: Pt reports that he is feeling good this morning, he is able to do his yard work outside without discomfort and is trying to be more aware of his body mechanics  Objective: See treatment diary below    Precautions: HTN, cervical spine disc degeneration     Daily Treatment Diary         Manuals                       Lumbar PA Grade 3-4   4'                   STM T-12-L3 PSM on L   6'                                                                   Exercise Diary                        Supine piriformis stretch nv  5x20''                   SKTC nv  x10                   SL QL stretch 5x15''  5x15''                   Supine HS stretch 10x10''  10x10''                   PPT    10x10''                   Bridges    2x10                   Standing QL stretch    5x20''                                                                                                                                                                                                                                                                                                                                                                                           Modalities                                                                              Assessment: Tolerated treatment well  Patient demonstrated fatigue post treatment  Verbal cueing during TE to activate pelvic tilt and for postural adjustments during QL stretch  Will continue to progress strengthening nv as able  Plan: Continue per plan of care

## 2018-06-08 ENCOUNTER — OFFICE VISIT (OUTPATIENT)
Dept: PHYSICAL THERAPY | Facility: CLINIC | Age: 78
End: 2018-06-08
Payer: MEDICARE

## 2018-06-08 DIAGNOSIS — M54.42 ACUTE LEFT-SIDED LOW BACK PAIN WITH LEFT-SIDED SCIATICA: Primary | ICD-10-CM

## 2018-06-08 DIAGNOSIS — M54.32 SCIATICA OF LEFT SIDE: ICD-10-CM

## 2018-06-08 PROCEDURE — 97140 MANUAL THERAPY 1/> REGIONS: CPT | Performed by: PHYSICAL THERAPIST

## 2018-06-08 PROCEDURE — 97110 THERAPEUTIC EXERCISES: CPT | Performed by: PHYSICAL THERAPIST

## 2018-06-08 PROCEDURE — G8991 OTHER PT/OT GOAL STATUS: HCPCS | Performed by: PHYSICAL THERAPIST

## 2018-06-08 PROCEDURE — G8990 OTHER PT/OT CURRENT STATUS: HCPCS | Performed by: PHYSICAL THERAPIST

## 2018-06-08 NOTE — PROGRESS NOTES
Daily Note     Today's date: 2018  Patient name: Ce Cameron  : 1940  MRN: [id number]  Referring provider: Rhianna Kitchen DO  Dx:   Encounter Diagnosis     ICD-10-CM    1  Acute left-sided low back pain with left-sided sciatica M54 42    2  Sciatica of left side M54 32                   Subjective: Pt reports he continues to feel very good, he remains active in his garden and is on his feet for most of the day  He completes his HEP each day and has no reports of pain, requesting to transition towards HEP nv  Objective: See treatment diary below    Precautions: HTN, cervical spine disc degeneration     Daily Treatment Diary         Manuals                     Lumbar PA Grade 3-4    4'  6'                 STM T-12-L3 PSM on L    6'  8'                                                                 Exercise Diary                        Supine piriformis stretch nv  5x20''  5x20''                 SKTC nv  x10  x10                 SL QL stretch 5x15''  5x15''  5x15''                 Supine HS stretch 10x10''  10x10''  10x10''                 PPT    10x10''  10x10''                 Bridges    2x10  2x10                 Standing QL stretch    5x20''  5x20''                 Prone knee flexion stretch     10x10'' green strap                  SLR flex/abd       nv                 Seated HS stretch     5x20''                                                                                                                                                                                                                                                                                                                 Modalities                                                                              Assessment: Tolerated treatment well  Patient demonstrated fatigue post treatment   Attempted to add SLR this visit but pt with increased cramping in B LE   Able to reduce this with seated stretches however cramping persisted when in supine position  Potential DC nv if sx remain absent in low back, FOTO nv       Plan: Potential discharge next visit

## 2018-06-13 ENCOUNTER — OFFICE VISIT (OUTPATIENT)
Dept: PHYSICAL THERAPY | Facility: CLINIC | Age: 78
End: 2018-06-13
Payer: MEDICARE

## 2018-06-13 DIAGNOSIS — M54.32 SCIATICA OF LEFT SIDE: ICD-10-CM

## 2018-06-13 DIAGNOSIS — I10 ESSENTIAL HYPERTENSION: Primary | ICD-10-CM

## 2018-06-13 DIAGNOSIS — M54.42 ACUTE LEFT-SIDED LOW BACK PAIN WITH LEFT-SIDED SCIATICA: Primary | ICD-10-CM

## 2018-06-13 PROCEDURE — 97110 THERAPEUTIC EXERCISES: CPT | Performed by: PHYSICAL THERAPIST

## 2018-06-13 PROCEDURE — 97140 MANUAL THERAPY 1/> REGIONS: CPT | Performed by: PHYSICAL THERAPIST

## 2018-06-13 RX ORDER — LISINOPRIL 10 MG/1
10 TABLET ORAL DAILY
Qty: 90 TABLET | Refills: 1 | Status: SHIPPED | OUTPATIENT
Start: 2018-06-13 | End: 2018-11-16 | Stop reason: SDUPTHER

## 2018-06-13 NOTE — PROGRESS NOTES
Daily Note     Today's date: 2018  Patient name: Stephen Pratt  : 1940  MRN: [id number]  Referring provider: Ya Pritchett DO  Dx:   Encounter Diagnosis     ICD-10-CM    1  Acute left-sided low back pain with left-sided sciatica M54 42    2  Sciatica of left side M54 32                   Subjective: Pt reports he continues to feel great when he is out working in the garden, his back really doesn't bother him during his normal day  Objective: See treatment diary below     Precautions: HTN, cervical spine disc degeneration     Daily Treatment Diary      Manuals                   Lumbar PA Grade 3-4    4'  6'  6'               STM T-12-L3 PSM on L    6'  8'  8'                                                               Exercise Diary                        Supine piriformis stretch nv  5x20''  5x20''  5x20''               SKTC nv  x10  x10                 SL QL stretch 5x15''  5x15''  5x15''  5x15''               Supine HS stretch 10x10''  10x10''  10x10''  10x10''               PPT    10x10''  10x10''  10x10''               Bridges    2x10  2x10  2x10               Standing QL stretch    5x20''  5x20''  5x20''               Prone knee flexion stretch     10x10'' green strap   10x10'' green strap               SLR flex/abd       nv  2x10               Seated HS stretch     5x20''  5x20''                                                                                                                                                                                                                                                                                                               Modalities                                                                            Assessment: Tolerated treatment well  Patient demonstrated fatigue post treatment  Pt reports they have improved to 100% since beginning Physical Therapy   Pt educated on importance of continuing HEP, progressions of exercises and to contact the facility if there are any questions or concerns in the future  Pt will be discharged from PT at this time        Plan: DC from PT

## 2018-06-29 DIAGNOSIS — I10 HYPERTENSION, UNSPECIFIED TYPE: Primary | ICD-10-CM

## 2018-06-29 RX ORDER — EZETIMIBE AND SIMVASTATIN 10; 20 MG/1; MG/1
1 TABLET ORAL DAILY
Qty: 90 TABLET | Refills: 1 | Status: SHIPPED | OUTPATIENT
Start: 2018-06-29 | End: 2018-11-16 | Stop reason: SDUPTHER

## 2018-09-04 ENCOUNTER — OFFICE VISIT (OUTPATIENT)
Dept: OBGYN CLINIC | Facility: CLINIC | Age: 78
End: 2018-09-04
Payer: MEDICARE

## 2018-09-04 ENCOUNTER — APPOINTMENT (OUTPATIENT)
Dept: RADIOLOGY | Facility: CLINIC | Age: 78
End: 2018-09-04
Payer: MEDICARE

## 2018-09-04 VITALS
WEIGHT: 157 LBS | HEIGHT: 67 IN | SYSTOLIC BLOOD PRESSURE: 124 MMHG | BODY MASS INDEX: 24.64 KG/M2 | DIASTOLIC BLOOD PRESSURE: 78 MMHG | HEART RATE: 82 BPM

## 2018-09-04 DIAGNOSIS — R20.0 NUMBNESS AND TINGLING IN LEFT HAND: ICD-10-CM

## 2018-09-04 DIAGNOSIS — R20.2 NUMBNESS AND TINGLING IN LEFT HAND: ICD-10-CM

## 2018-09-04 DIAGNOSIS — R20.2 NUMBNESS AND TINGLING IN LEFT HAND: Primary | ICD-10-CM

## 2018-09-04 DIAGNOSIS — R20.0 NUMBNESS AND TINGLING IN LEFT HAND: Primary | ICD-10-CM

## 2018-09-04 DIAGNOSIS — M65.352 TRIGGER FINGER, LEFT LITTLE FINGER: ICD-10-CM

## 2018-09-04 PROCEDURE — 99213 OFFICE O/P EST LOW 20 MIN: CPT | Performed by: ORTHOPAEDIC SURGERY

## 2018-09-04 PROCEDURE — 73110 X-RAY EXAM OF WRIST: CPT

## 2018-09-04 NOTE — ASSESSMENT & PLAN NOTE
Findings consistent with left hand numbness and tingling, possible reoccurrence of carpal tunnel syndrome  Findings and treatment options were discussed with the patient  Recommend EMG of the left upper extremity to confirm diagnosis  Will have him follow up with Dr Don Pimentel after EMG for further treatment recommendations  All questions were answered to patient's satisfaction

## 2018-09-04 NOTE — ASSESSMENT & PLAN NOTE
Findings consistent with left small trigger finger  Findings and treatment options were discussed with the patient  Patient did well with cortisone injections the past, but if he needs to have surgery for his left wrist he would like to have the trigger finger taken care of at the same time  He will follow up with Dr Tha Couch for further treatment recommendations  All questions were answered to patient's satisfaction

## 2018-09-06 ENCOUNTER — HOSPITAL ENCOUNTER (OUTPATIENT)
Dept: NEUROLOGY | Facility: AMBULATORY SURGERY CENTER | Age: 78
Discharge: HOME | End: 2018-09-06
Payer: MEDICARE

## 2018-09-06 DIAGNOSIS — R20.2 NUMBNESS AND TINGLING IN LEFT HAND: ICD-10-CM

## 2018-09-06 DIAGNOSIS — R20.0 NUMBNESS AND TINGLING IN LEFT HAND: ICD-10-CM

## 2018-09-06 PROCEDURE — 95886 MUSC TEST DONE W/N TEST COMP: CPT | Performed by: PSYCHIATRY & NEUROLOGY

## 2018-09-06 PROCEDURE — 95909 NRV CNDJ TST 5-6 STUDIES: CPT | Performed by: PSYCHIATRY & NEUROLOGY

## 2018-09-25 ENCOUNTER — CLINICAL SUPPORT (OUTPATIENT)
Dept: FAMILY MEDICINE CLINIC | Facility: CLINIC | Age: 78
End: 2018-09-25
Payer: MEDICARE

## 2018-09-25 DIAGNOSIS — Z23 NEED FOR INFLUENZA VACCINATION: Primary | ICD-10-CM

## 2018-09-25 PROCEDURE — 90662 IIV NO PRSV INCREASED AG IM: CPT

## 2018-09-25 PROCEDURE — G0008 ADMIN INFLUENZA VIRUS VAC: HCPCS

## 2018-10-01 ENCOUNTER — OFFICE VISIT (OUTPATIENT)
Dept: OBGYN CLINIC | Facility: CLINIC | Age: 78
End: 2018-10-01
Payer: MEDICARE

## 2018-10-01 VITALS
BODY MASS INDEX: 24.27 KG/M2 | HEIGHT: 67 IN | HEART RATE: 79 BPM | WEIGHT: 154.6 LBS | SYSTOLIC BLOOD PRESSURE: 151 MMHG | DIASTOLIC BLOOD PRESSURE: 91 MMHG

## 2018-10-01 DIAGNOSIS — M65.352 TRIGGER FINGER, LEFT LITTLE FINGER: ICD-10-CM

## 2018-10-01 DIAGNOSIS — R20.0 NUMBNESS AND TINGLING IN LEFT HAND: ICD-10-CM

## 2018-10-01 DIAGNOSIS — R20.2 NUMBNESS AND TINGLING IN LEFT HAND: ICD-10-CM

## 2018-10-01 DIAGNOSIS — G56.02 CARPAL TUNNEL SYNDROME ON LEFT: Primary | ICD-10-CM

## 2018-10-01 PROCEDURE — 1123F ACP DISCUSS/DSCN MKR DOCD: CPT | Performed by: ORTHOPAEDIC SURGERY

## 2018-10-01 PROCEDURE — 99213 OFFICE O/P EST LOW 20 MIN: CPT | Performed by: ORTHOPAEDIC SURGERY

## 2018-10-01 RX ORDER — CLINDAMYCIN PHOSPHATE 900 MG/50ML
900 INJECTION INTRAVENOUS ONCE
Status: CANCELLED | OUTPATIENT
Start: 2019-04-25 | End: 2018-10-01

## 2018-10-01 NOTE — PROGRESS NOTES
ASSESSMENT/PLAN:    Assessment:   Carpal Tunnel Syndrome  left and Trigger Finger  left  small finger    Plan:   Open Carpal Tunnel Release  Left with 93% success rate and Trigger Finger Release  left  small finger under general     Follow Up: After Surgery    To Do Next Visit:    and Sutures out    General Discussions:       Operative Discussions:  Trigger Finger Release: The anatomy and physiology of trigger finger was discussed with the patient today in the office  Edema and increased contact pressure within the flexor tendons at the A1 pulley can cause pain, crepitation, and limitation of function  Treatment options include resting MP blocking splints to decrease edema, oral anti-inflammatory medications, home or formal therapy exercises, up to 2 steroid injections or surgical release  While majority of patients do respond to conservative treatment, up to 20% may require surgical release  The patient has elected release of the trigger finger  The patient has elected to undergo a release of the A1 pulley (trigger finger)  A small incision will be made over the palmar aspect of the hand, the tendon sheath holding the flexor tendons will be released  In the postoperative period, light activities are allowed immediately, driving is allowed when narcotic medication has stopped, and the incision may get wet after 2 days  Heavy activities (lifting more than approximately 10 pounds) will be allowed after the follow up appointment in 1-2 weeks  While the pain and discomfort within the wrist typically improves rapidly, some residual discomfort may be present for up to 6 weeks  The nodule that is typically palpable in the palmar aspect of the hand will not be removed, as this would necessitate removal of a portion of the flexor tendon, however the catching, clicking, and locking should resolve  Approximate success rate is 98%  The risks and benefits of the procedure were explained to the patient, which include, but are not limited to: Bleeding, infection, recurrence, pain, scar, damage to tendons, damage to nerves, and damage to blood vessels, need for future surgery and complications related to anesthesia  If bony work is done, risks also include malunion and nonunion  These risks, along with alternative conservative treatment options, and postoperative protocols were voiced back and understood by the patient  All questions were answered to the patient's satisfaction  The patient agrees to comply with a standard postoperative protocol, and is willing to proceed  Education was provided via written and auditory forms  There were no barriers to learning  Written handouts regarding wound care, incision and scar care, and general preoperative information, as well as risks and benefits were provided to the patient  Standard Consent: The risks and benefits of the procedure were explained to the patient, which include, but are not limited to: Bleeding, infection, recurrence, pain, scar, damage to tendons, damage to nerves, and damage to blood vessels, failure to give desired results and complications related to anesthesia  These risks, along with alternative conservative treatment options, and postoperative protocols were voiced back and understood by the patient  All questions were answered to the patient's satisfaction  The patient agrees to comply with a standard postoperative protocol, and is willing to proceed  Education was provided via written and auditory forms  There were no barriers to learning  Written handouts regarding wound care, incision and scar care, and general preoperative information was provided to the patient  Prior to surgery, the patient may be requested to stop all anti-inflammatory medications  Prophylactic aspirin, Plavix, and Coumadin may be allowed to be continued  Medications including vitamin E , ginkgo, and fish oil are requested to be stopped approximately one week prior to surgery  Hypertensive medications and beta blockers, if taken, should be continued  _____________________________________________________  CHIEF COMPLAINT:  Chief Complaint   Patient presents with    Left Hand - Numbness, Tingling         SUBJECTIVE:  Jovan Velazquez is a 70y o  year old male who presents with Numbness to the left index finger, long finger and thumb  This started  1 year(s) ago as Sudden  Pt states that the numbness is waking him from sleep at night and describes a positive flick sign  Radiation: Yes to the  index finger, long finger and thumb  Previous Treatments: patient has tried bracing without relief, the patient also has a hx of b/l endoscopic carpal tunnel releases with only partial relief  Patients left hand was done in '04 and his right in '05  Associated symptoms: patients left small finger does stick, click and lock on him every time that he makes a fist that has been ongoing for 1 year       PAST MEDICAL HISTORY:  Past Medical History:   Diagnosis Date    Chronic sinusitis     Last Assessed:3/26/14    Degeneration of cervical intervertebral disc     Last Assessed:3/26/14    Derangement of unspecified medial meniscus due to old tear or injury, left knee     Last Assessed:4/10/14    Erythema migrans (Lyme disease)     Last Assessed:5/22/13    Eustachian tube dysfunction     Last Assessed:9/29/15    Hyperlipidemia     Hypertension     Osteoarthritis of wrist     Last Assessed:5/13/15    Primary osteoarthritis of left knee     Last Assessed:7/16/14    Spondylosis of cervical region without myelopathy or radiculopathy     Last Assessed:3/31/14    TMJ syndrome     Last Assessed:12/30/14    Trigger finger of left hand     Last Assessed:5/13/15    Trigger finger of right hand     Little finger, middle finger       PAST SURGICAL HISTORY:  Past Surgical History:   Procedure Laterality Date    COLONOSCOPY      KNEE SURGERY      NEUROPLASTY / TRANSPOSITION MEDIAN NERVE AT CARPAL TUNNEL      SHOULDER ARTHROSCOPY Bilateral     SHOULDER SURGERY      TONSILLECTOMY         FAMILY HISTORY:  Family History   Problem Relation Age of Onset    Heart disease Mother         Coronary heart disease    Dementia Mother     Heart disease Father         Coronary heart disease    Lung cancer Paternal Grandmother        SOCIAL HISTORY:  Social History   Substance Use Topics    Smoking status: Former Smoker    Smokeless tobacco: Never Used    Alcohol use Yes      Comment: Social drinker/Denies alcohol causing problems       MEDICATIONS:    Current Outpatient Prescriptions:     aspirin 81 MG tablet, Take 1 tablet by mouth every other day  , Disp: , Rfl:     ezetimibe-simvastatin (VYTORIN) 10-20 mg per tablet, Take 1 tablet by mouth daily, Disp: 90 tablet, Rfl: 1    lisinopril (ZESTRIL) 10 mg tablet, Take 1 tablet (10 mg total) by mouth daily, Disp: 90 tablet, Rfl: 1    cyclobenzaprine (FLEXERIL) 10 mg tablet, Take 1 tablet (10 mg total) by mouth 2 (two) times a day (Patient not taking: Reported on 9/4/2018 ), Disp: 20 tablet, Rfl: 0    predniSONE 10 mg tablet, 6 tabs on Day 1,5 tabs on Day 2,4 tabs on Day 3,3 tabs on Day 4,2 tabs on  Day 5 And 1 tab on Day 6 (Patient not taking: Reported on 9/4/2018 ), Disp: 21 tablet, Rfl: 0    tamsulosin (FLOMAX) 0 4 mg, Take 1 capsule (0 4 mg total) by mouth daily with dinner (Patient not taking: Reported on 9/4/2018 ), Disp: 90 capsule, Rfl: 1    Current Facility-Administered Medications:     methylPREDNISolone acetate (DEPO-MEDROL) injection 40 mg, 40 mg, Intramuscular, Once, Raymon Marte,     ALLERGIES:  Allergies   Allergen Reactions    Azithromycin     Meperidine     Pseudoephedrine        REVIEW OF SYSTEMS:  Pertinent items are noted in HPI      LABS:  HgA1c: No results found for: HGBA1C  BMP:   Lab Results   Component Value Date    GLUCOSE 97 09/22/2015    CALCIUM 8 8 11/21/2017     11/21/2017    K 4 8 11/21/2017    CO2 30 11/21/2017     11/21/2017    BUN 14 11/21/2017    CREATININE 0 75 11/21/2017         _____________________________________________________  PHYSICAL EXAMINATION:  General: well developed and well nourished, alert, oriented times 3 and appears comfortable  Psychiatric: Normal  HEENT: Trachea Midline, No torticollis  Cardiovascular: No discernable arrhythmia  Pulmonary: No wheezing or stridor  Skin: healed incisions  Neurovascular: Pulses Intact    MUSCULOSKELETAL EXAMINATION:  LEFT SIDE:  Carpal tunnel:  No atrophy thenar muscles, Weakness APB, Positive Derkin's Compression Test and can oppose to ring finger, apb 4/5, AIN 5/5 and Finger:  Triggering  small finger and Nodules  small finger    _____________________________________________________  STUDIES REVIEWED:  EMG: shows moderate median neuropathy       PROCEDURES PERFORMED:  Procedures  No Procedures performed today   Scribe Attestation    I,:   Marie Rodriguez am acting as a scribe while in the presence of the attending physician :        I,:   Aurelio Costa MD personally performed the services described in this documentation    as scribed in my presence :

## 2018-10-01 NOTE — H&P
ASSESSMENT/PLAN:    Assessment:   Carpal Tunnel Syndrome  left and Trigger Finger  left  small finger    Plan:   Open Carpal Tunnel Release  Left with 93% success rate and Trigger Finger Release  left  small finger under general     Follow Up: After Surgery    To Do Next Visit:    and Sutures out    General Discussions:       Operative Discussions:  Trigger Finger Release: The anatomy and physiology of trigger finger was discussed with the patient today in the office  Edema and increased contact pressure within the flexor tendons at the A1 pulley can cause pain, crepitation, and limitation of function  Treatment options include resting MP blocking splints to decrease edema, oral anti-inflammatory medications, home or formal therapy exercises, up to 2 steroid injections or surgical release  While majority of patients do respond to conservative treatment, up to 20% may require surgical release  The patient has elected release of the trigger finger  The patient has elected to undergo a release of the A1 pulley (trigger finger)  A small incision will be made over the palmar aspect of the hand, the tendon sheath holding the flexor tendons will be released  In the postoperative period, light activities are allowed immediately, driving is allowed when narcotic medication has stopped, and the incision may get wet after 2 days  Heavy activities (lifting more than approximately 10 pounds) will be allowed after the follow up appointment in 1-2 weeks  While the pain and discomfort within the wrist typically improves rapidly, some residual discomfort may be present for up to 6 weeks  The nodule that is typically palpable in the palmar aspect of the hand will not be removed, as this would necessitate removal of a portion of the flexor tendon, however the catching, clicking, and locking should resolve  Approximate success rate is 98%  The risks and benefits of the procedure were explained to the patient, which include, but are not limited to: Bleeding, infection, recurrence, pain, scar, damage to tendons, damage to nerves, and damage to blood vessels, need for future surgery and complications related to anesthesia  If bony work is done, risks also include malunion and nonunion  These risks, along with alternative conservative treatment options, and postoperative protocols were voiced back and understood by the patient  All questions were answered to the patient's satisfaction  The patient agrees to comply with a standard postoperative protocol, and is willing to proceed  Education was provided via written and auditory forms  There were no barriers to learning  Written handouts regarding wound care, incision and scar care, and general preoperative information, as well as risks and benefits were provided to the patient  Standard Consent: The risks and benefits of the procedure were explained to the patient, which include, but are not limited to: Bleeding, infection, recurrence, pain, scar, damage to tendons, damage to nerves, and damage to blood vessels, failure to give desired results and complications related to anesthesia  These risks, along with alternative conservative treatment options, and postoperative protocols were voiced back and understood by the patient  All questions were answered to the patient's satisfaction  The patient agrees to comply with a standard postoperative protocol, and is willing to proceed  Education was provided via written and auditory forms  There were no barriers to learning  Written handouts regarding wound care, incision and scar care, and general preoperative information was provided to the patient  Prior to surgery, the patient may be requested to stop all anti-inflammatory medications  Prophylactic aspirin, Plavix, and Coumadin may be allowed to be continued  Medications including vitamin E , ginkgo, and fish oil are requested to be stopped approximately one week prior to surgery  Hypertensive medications and beta blockers, if taken, should be continued  _____________________________________________________  CHIEF COMPLAINT:  Chief Complaint   Patient presents with    Left Hand - Numbness, Tingling         SUBJECTIVE:  Dennis Olivo is a 70y o  year old male who presents with Numbness to the left index finger, long finger and thumb  This started  1 year(s) ago as Sudden  Pt states that the numbness is waking him from sleep at night and describes a positive flick sign  Radiation: Yes to the  index finger, long finger and thumb  Previous Treatments: patient has tried bracing without relief, the patient also has a hx of b/l endoscopic carpal tunnel releases with only partial relief  Patients left hand was done in '04 and his right in '05  Associated symptoms: patients left small finger does stick, click and lock on him every time that he makes a fist that has been ongoing for 1 year       PAST MEDICAL HISTORY:  Past Medical History:   Diagnosis Date    Chronic sinusitis     Last Assessed:3/26/14    Degeneration of cervical intervertebral disc     Last Assessed:3/26/14    Derangement of unspecified medial meniscus due to old tear or injury, left knee     Last Assessed:4/10/14    Erythema migrans (Lyme disease)     Last Assessed:5/22/13    Eustachian tube dysfunction     Last Assessed:9/29/15    Hyperlipidemia     Hypertension     Osteoarthritis of wrist     Last Assessed:5/13/15    Primary osteoarthritis of left knee     Last Assessed:7/16/14    Spondylosis of cervical region without myelopathy or radiculopathy     Last Assessed:3/31/14    TMJ syndrome     Last Assessed:12/30/14    Trigger finger of left hand     Last Assessed:5/13/15    Trigger finger of right hand     Little finger, middle finger       PAST SURGICAL HISTORY:  Past Surgical History:   Procedure Laterality Date    COLONOSCOPY      KNEE SURGERY      NEUROPLASTY / TRANSPOSITION MEDIAN NERVE AT CARPAL TUNNEL      SHOULDER ARTHROSCOPY Bilateral     SHOULDER SURGERY      TONSILLECTOMY         FAMILY HISTORY:  Family History   Problem Relation Age of Onset    Heart disease Mother         Coronary heart disease    Dementia Mother     Heart disease Father         Coronary heart disease    Lung cancer Paternal Grandmother        SOCIAL HISTORY:  Social History   Substance Use Topics    Smoking status: Former Smoker    Smokeless tobacco: Never Used    Alcohol use Yes      Comment: Social drinker/Denies alcohol causing problems       MEDICATIONS:    Current Outpatient Prescriptions:     aspirin 81 MG tablet, Take 1 tablet by mouth every other day  , Disp: , Rfl:     ezetimibe-simvastatin (VYTORIN) 10-20 mg per tablet, Take 1 tablet by mouth daily, Disp: 90 tablet, Rfl: 1    lisinopril (ZESTRIL) 10 mg tablet, Take 1 tablet (10 mg total) by mouth daily, Disp: 90 tablet, Rfl: 1    cyclobenzaprine (FLEXERIL) 10 mg tablet, Take 1 tablet (10 mg total) by mouth 2 (two) times a day (Patient not taking: Reported on 9/4/2018 ), Disp: 20 tablet, Rfl: 0    predniSONE 10 mg tablet, 6 tabs on Day 1,5 tabs on Day 2,4 tabs on Day 3,3 tabs on Day 4,2 tabs on  Day 5 And 1 tab on Day 6 (Patient not taking: Reported on 9/4/2018 ), Disp: 21 tablet, Rfl: 0    tamsulosin (FLOMAX) 0 4 mg, Take 1 capsule (0 4 mg total) by mouth daily with dinner (Patient not taking: Reported on 9/4/2018 ), Disp: 90 capsule, Rfl: 1    Current Facility-Administered Medications:     methylPREDNISolone acetate (DEPO-MEDROL) injection 40 mg, 40 mg, Intramuscular, Once, Raymon Marte,     ALLERGIES:  Allergies   Allergen Reactions    Azithromycin     Meperidine     Pseudoephedrine        REVIEW OF SYSTEMS:  Pertinent items are noted in HPI      LABS:  HgA1c: No results found for: HGBA1C  BMP:   Lab Results   Component Value Date    GLUCOSE 97 09/22/2015    CALCIUM 8 8 11/21/2017     11/21/2017    K 4 8 11/21/2017    CO2 30 11/21/2017     11/21/2017    BUN 14 11/21/2017    CREATININE 0 75 11/21/2017         _____________________________________________________  PHYSICAL EXAMINATION:  General: well developed and well nourished, alert, oriented times 3 and appears comfortable  Psychiatric: Normal  HEENT: Trachea Midline, No torticollis  Cardiovascular: No discernable arrhythmia  Pulmonary: No wheezing or stridor  Skin: healed incisions  Neurovascular: Pulses Intact    MUSCULOSKELETAL EXAMINATION:  LEFT SIDE:  Carpal tunnel:  No atrophy thenar muscles, Weakness APB, Positive Derkin's Compression Test and can oppose to ring finger, apb 4/5, AIN 5/5 and Finger:  Triggering  small finger and Nodules  small finger    _____________________________________________________  STUDIES REVIEWED:  EMG: shows moderate median neuropathy       PROCEDURES PERFORMED:  Procedures  No Procedures performed today   Scribe Attestation    I,:   Robert Bunn am acting as a scribe while in the presence of the attending physician :        I,:   Molly Almanza MD personally performed the services described in this documentation    as scribed in my presence :

## 2018-11-02 ENCOUNTER — PREP FOR PROCEDURE (OUTPATIENT)
Dept: OBGYN CLINIC | Facility: HOSPITAL | Age: 78
End: 2018-11-02

## 2018-11-08 ENCOUNTER — OFFICE VISIT (OUTPATIENT)
Dept: FAMILY MEDICINE CLINIC | Facility: CLINIC | Age: 78
End: 2018-11-08
Payer: MEDICARE

## 2018-11-08 VITALS
HEIGHT: 67 IN | SYSTOLIC BLOOD PRESSURE: 150 MMHG | TEMPERATURE: 96.7 F | HEART RATE: 80 BPM | BODY MASS INDEX: 24.92 KG/M2 | WEIGHT: 158.8 LBS | DIASTOLIC BLOOD PRESSURE: 92 MMHG

## 2018-11-08 DIAGNOSIS — Z12.5 SCREENING FOR PROSTATE CANCER: ICD-10-CM

## 2018-11-08 DIAGNOSIS — Z00.00 MEDICARE ANNUAL WELLNESS VISIT, SUBSEQUENT: Primary | ICD-10-CM

## 2018-11-08 DIAGNOSIS — Z23 NEED FOR SHINGLES VACCINE: ICD-10-CM

## 2018-11-08 DIAGNOSIS — Z13.6 SCREENING FOR CARDIOVASCULAR CONDITION: ICD-10-CM

## 2018-11-08 PROCEDURE — G0439 PPPS, SUBSEQ VISIT: HCPCS | Performed by: FAMILY MEDICINE

## 2018-11-08 RX ORDER — AMOXICILLIN 500 MG/1
CAPSULE ORAL
Refills: 1 | COMMUNITY
Start: 2018-10-18 | End: 2019-05-28 | Stop reason: ALTCHOICE

## 2018-11-08 NOTE — PROGRESS NOTES
Assessment/Plan:     Diagnoses and all orders for this visit:    Medicare annual wellness visit, subsequent  -     CBC and differential; Future    Need for shingles vaccine  -     Zoster Vac Recomb Adjuvanted 50 MCG SUSR; Inject 1 Dose into a muscle once for 1 dose  -     CBC and differential; Future    Screening for cardiovascular condition  -     CBC and differential; Future  -     Comprehensive metabolic panel; Future  -     Lipid panel; Future    Screening for prostate cancer  -     PSA, Total Screen; Future  -     UA w Reflex to Microscopic w Reflex to Culture; Future    Other orders  -     amoxicillin (AMOXIL) 500 mg capsule; TAKE ONE CAPSULE BY MOUTH EVERY 8 HOURS UNTIL FINISHED            Subjective:     Chief Complaint   Patient presents with    Physical Exam     medicare annual complete physical 70year old male         Patient ID: Karissa Collins is a 70 y o  male  Here for Medicare wellness visit  No acute physical complaints today        The following portions of the patient's history were reviewed and updated as appropriate: allergies, current medications, past family history, past medical history, past social history, past surgical history and problem list     Review of Systems   Constitutional: Negative  HENT: Negative  Eyes: Negative  Respiratory: Negative  Cardiovascular: Negative  Gastrointestinal: Negative  Negative for bowel incontinence  Endocrine: Negative  Genitourinary: Negative  Musculoskeletal: Negative  Skin: Negative  Allergic/Immunologic: Negative  Neurological: Negative  Hematological: Negative  Psychiatric/Behavioral: Negative  The patient is not nervous/anxious  All other systems reviewed and are negative          Objective:    Vitals:    11/08/18 1249   BP: 150/92   BP Location: Right arm   Patient Position: Sitting   Cuff Size: Standard   Pulse: 80   Temp: (!) 96 7 °F (35 9 °C)   TempSrc: Tympanic   Weight: 72 kg (158 lb 12 8 oz) Height: 5' 7 32" (1 71 m)          Physical Exam   Constitutional: He is oriented to person, place, and time  He appears well-developed and well-nourished  No distress  HENT:   Head: Normocephalic  Right Ear: External ear normal    Left Ear: External ear normal    Nose: Nose normal    Mouth/Throat: Oropharynx is clear and moist    Eyes: Pupils are equal, round, and reactive to light  Conjunctivae and EOM are normal  Right eye exhibits no discharge  Left eye exhibits no discharge  Neck: Normal range of motion  Cardiovascular: Normal rate, regular rhythm and normal heart sounds  Pulmonary/Chest: Effort normal and breath sounds normal    Abdominal: Soft  Bowel sounds are normal  He exhibits no distension  There is no tenderness  Musculoskeletal: Normal range of motion  Neurological: He is alert and oriented to person, place, and time  No cranial nerve deficit  Skin: Skin is warm and dry  No rash noted  Psychiatric: He has a normal mood and affect  His behavior is normal  Judgment and thought content normal    Nursing note and vitals reviewed  Assessment and Plan:    Problem List Items Addressed This Visit     None      Visit Diagnoses     Medicare annual wellness visit, subsequent    -  Primary    Relevant Orders    CBC and differential    Need for shingles vaccine        Relevant Medications    Zoster Vac Recomb Adjuvanted 50 MCG SUSR    Other Relevant Orders    CBC and differential    Screening for cardiovascular condition        Relevant Orders    CBC and differential    Comprehensive metabolic panel    Lipid panel    Screening for prostate cancer        Relevant Orders    PSA, Total Screen    UA w Reflex to Microscopic w Reflex to Culture        Health Maintenance Due   Topic Date Due    DTaP,Tdap,and Td Vaccines (1 - Tdap) 12/21/2016         HPI:  Izaiah Swan is a 70 y o  male here for his Subsequent Wellness Visit      Patient Active Problem List   Diagnosis    Allergic rhinitis    Dermatitis, eczematoid    Generalized osteoarthritis    Hypertension    Hyperlipidemia    Numbness and tingling in left hand    Trigger finger, left little finger    Carpal tunnel syndrome of left wrist    Carpal tunnel syndrome on left     Past Medical History:   Diagnosis Date    Chronic sinusitis     Last Assessed:3/26/14    Degeneration of cervical intervertebral disc     Last Assessed:3/26/14    Derangement of unspecified medial meniscus due to old tear or injury, left knee     Last Assessed:4/10/14    Erythema migrans (Lyme disease)     Last Assessed:5/22/13    Eustachian tube dysfunction     Last Assessed:9/29/15    Hyperlipidemia     Hypertension     Osteoarthritis of wrist     Last Assessed:5/13/15    Primary osteoarthritis of left knee     Last Assessed:7/16/14    Spondylosis of cervical region without myelopathy or radiculopathy     Last Assessed:3/31/14    TMJ syndrome     Last Assessed:12/30/14    Trigger finger of left hand     Last Assessed:5/13/15    Trigger finger of right hand     Little finger, middle finger     Past Surgical History:   Procedure Laterality Date    COLONOSCOPY      KNEE SURGERY      NEUROPLASTY / TRANSPOSITION MEDIAN NERVE AT CARPAL TUNNEL      SHOULDER ARTHROSCOPY Bilateral     SHOULDER SURGERY      TONSILLECTOMY       Family History   Problem Relation Age of Onset    Heart disease Mother         Coronary heart disease    Dementia Mother     Heart disease Father         Coronary heart disease    Lung cancer Paternal Grandmother      History   Smoking Status    Former Smoker   Smokeless Tobacco    Never Used     History   Alcohol Use    Yes     Comment: Social drinker/Denies alcohol causing problems      History   Drug Use No       Current Outpatient Prescriptions   Medication Sig Dispense Refill    ezetimibe-simvastatin (VYTORIN) 10-20 mg per tablet Take 1 tablet by mouth daily 90 tablet 1    lisinopril (ZESTRIL) 10 mg tablet Take 1 tablet (10 mg total) by mouth daily 90 tablet 1    amoxicillin (AMOXIL) 500 mg capsule TAKE ONE CAPSULE BY MOUTH EVERY 8 HOURS UNTIL FINISHED  1    aspirin 81 MG tablet Take 1 tablet by mouth every other day        cyclobenzaprine (FLEXERIL) 10 mg tablet Take 1 tablet (10 mg total) by mouth 2 (two) times a day (Patient not taking: Reported on 9/4/2018 ) 20 tablet 0    predniSONE 10 mg tablet 6 tabs on Day 1,5 tabs on Day 2,4 tabs on Day 3,3 tabs on Day 4,2 tabs on  Day 5 And 1 tab on Day 6 (Patient not taking: Reported on 9/4/2018 ) 21 tablet 0    Zoster Vac Recomb Adjuvanted 50 MCG SUSR Inject 1 Dose into a muscle once for 1 dose 1 each 0     Current Facility-Administered Medications   Medication Dose Route Frequency Provider Last Rate Last Dose    methylPREDNISolone acetate (DEPO-MEDROL) injection 40 mg  40 mg Intramuscular Once Raymon DO Rosanna         Allergies   Allergen Reactions    Azithromycin     Meperidine     Pseudoephedrine      Immunization History   Administered Date(s) Administered    Influenza Split High Dose Preservative Free IM 09/20/2012, 10/07/2013, 09/30/2014, 09/22/2015, 09/07/2016, 10/04/2017    Influenza, high dose seasonal 0 5 mL 09/25/2018    Pneumococcal Conjugate 13-Valent 11/21/2016    Pneumococcal Polysaccharide PPV23 09/19/2011, 05/17/2016    TD (adult) Preservative Free 05/20/2015, 12/20/2016    Td (adult), adsorbed 05/25/2005    Zoster 12/03/2017       Patient Care Team:  Nicole Rojas DO as PCP - MD See Abel MD    Medicare Screening Tests and Risk Assessments:  Parviz Parsons is here for his Subsequent Wellness visit  Health Risk Assessment:  Patient rates overall health as good  Patient feels that their physical health rating is Same  Hearing was rated as Same  Patient feels that their emotional and mental health rating is Same  Pain experienced by patient in the last 7 days has been None   Patient states that he has experienced no weight loss or gain in last 6 months  Emotional/Mental Health:  Patient has been feeling nervous/anxious  PHQ-9 Depression Screening:    Frequency of the following problems over the past two weeks:      1  Little interest or pleasure in doing things: 0 - not at all      2  Feeling down, depressed, or hopeless: 0 - not at all  PHQ-2 Score: 0          Broken Bones/Falls: Fall Risk Assessment:    In the past year, patient has experienced: No history of falling in past year          Bladder/Bowel:  Patient has not leaked urine accidently in the last six months  Patient reports no loss of bowel control  Immunizations:  Patient has had a flu vaccination within the last year  Patient has received a pneumonia shot  Patient has received a shingles shot  Patient has received tetanus/diphtheria shot  Home Safety:  Patient does not have trouble with stairs inside or outside of their home  Patient currently reports that there are no safety hazards present in home, working smoke alarms, working carbon monoxide detectors  Preventative Screenings:   prostate cancer screen performed, colon cancer screen completed, cholesterol screen completed, glaucoma eye exam completed,     Nutrition:  Current diet: Regular with servings of the following:    Medications:  Patient is not currently taking any over-the-counter supplements  Patient is able to manage medications  Lifestyle Choices:  Patient reports no tobacco use  Patient has not smoked or used tobacco in the past   Patient reports alcohol use  Patient drives a vehicle  Patient wears seat belt          Activities of Daily Living:  Can get out of bed by his or her self, able to dress self, able to make own meals, able to do own shopping, able to bathe self, can do own laundry/housekeeping, can manage own money, pay bills and track expenses    Previous Hospitalizations:  No hospitalization or ED visit in past 12 months        Advanced Directives:  Patient has decided on a power of   Patient has spoken to designated power of   Patient has completed advanced directive  Preventative Screening/Counseling:      Cardiovascular:      General: Screening Current          Diabetes:      General: Screening Current          Colorectal Cancer:      General: Screening Current          Prostate Cancer:      General: Screening Current          Osteoporosis:      General: Screening Not Indicated          AAA:      General: Screening Not Indicated          Glaucoma:      General: Screening Current          HIV:      General: Screening Not Indicated          Hepatitis C:      General: Screening Not Indicated        Advanced Directives:   Patient has living will for healthcare, has durable POA for healthcare, patient has an advanced directive  Information on ACP and/or AD provided  No 5 wishes given  End of life assessment reviewed with patient  Provider agrees with end of life decisions   Immunizations:      Influenza: Influenza UTD This Year      Pneumococcal: Lifetime Vaccine Completed      Shingrix: Shingrix Vaccine Needed Today and Risks & Benefits Discussed      Hepatitis B (Low risk patients): Series Not Indicated      Zostavax: Patient Declines      TD: Vaccine Status Unknown      TDAP: Vaccine Status Unknown      Other Preventative Counseling (Non-Medicare):   Increase physical activity, Nutrition Counseling and Car/seat belt/driving safety reviewed

## 2018-11-14 ENCOUNTER — APPOINTMENT (OUTPATIENT)
Dept: LAB | Facility: CLINIC | Age: 78
End: 2018-11-14
Payer: MEDICARE

## 2018-11-14 DIAGNOSIS — Z00.00 MEDICARE ANNUAL WELLNESS VISIT, SUBSEQUENT: ICD-10-CM

## 2018-11-14 DIAGNOSIS — Z13.6 SCREENING FOR CARDIOVASCULAR CONDITION: ICD-10-CM

## 2018-11-14 DIAGNOSIS — Z12.5 SCREENING FOR PROSTATE CANCER: ICD-10-CM

## 2018-11-14 DIAGNOSIS — Z23 NEED FOR SHINGLES VACCINE: ICD-10-CM

## 2018-11-14 LAB
ALBUMIN SERPL BCP-MCNC: 4 G/DL (ref 3.5–5)
ALP SERPL-CCNC: 54 U/L (ref 46–116)
ALT SERPL W P-5'-P-CCNC: 41 U/L (ref 12–78)
ANION GAP SERPL CALCULATED.3IONS-SCNC: 4 MMOL/L (ref 4–13)
AST SERPL W P-5'-P-CCNC: 37 U/L (ref 5–45)
BASOPHILS # BLD AUTO: 0.06 THOUSANDS/ΜL (ref 0–0.1)
BASOPHILS NFR BLD AUTO: 1 % (ref 0–1)
BILIRUB SERPL-MCNC: 0.62 MG/DL (ref 0.2–1)
BILIRUB UR QL STRIP: NEGATIVE
BUN SERPL-MCNC: 15 MG/DL (ref 5–25)
CALCIUM SERPL-MCNC: 8.8 MG/DL (ref 8.3–10.1)
CHLORIDE SERPL-SCNC: 106 MMOL/L (ref 100–108)
CHOLEST SERPL-MCNC: 143 MG/DL (ref 50–200)
CLARITY UR: CLEAR
CO2 SERPL-SCNC: 27 MMOL/L (ref 21–32)
COLOR UR: YELLOW
CREAT SERPL-MCNC: 0.83 MG/DL (ref 0.6–1.3)
EOSINOPHIL # BLD AUTO: 0.15 THOUSAND/ΜL (ref 0–0.61)
EOSINOPHIL NFR BLD AUTO: 3 % (ref 0–6)
ERYTHROCYTE [DISTWIDTH] IN BLOOD BY AUTOMATED COUNT: 12.5 % (ref 11.6–15.1)
GFR SERPL CREATININE-BSD FRML MDRD: 85 ML/MIN/1.73SQ M
GLUCOSE P FAST SERPL-MCNC: 102 MG/DL (ref 65–99)
GLUCOSE UR STRIP-MCNC: NEGATIVE MG/DL
HCT VFR BLD AUTO: 47.5 % (ref 36.5–49.3)
HDLC SERPL-MCNC: 50 MG/DL (ref 40–60)
HGB BLD-MCNC: 15.4 G/DL (ref 12–17)
HGB UR QL STRIP.AUTO: NEGATIVE
IMM GRANULOCYTES # BLD AUTO: 0.02 THOUSAND/UL (ref 0–0.2)
IMM GRANULOCYTES NFR BLD AUTO: 0 % (ref 0–2)
KETONES UR STRIP-MCNC: NEGATIVE MG/DL
LDLC SERPL CALC-MCNC: 77 MG/DL (ref 0–100)
LEUKOCYTE ESTERASE UR QL STRIP: NEGATIVE
LYMPHOCYTES # BLD AUTO: 1.58 THOUSANDS/ΜL (ref 0.6–4.47)
LYMPHOCYTES NFR BLD AUTO: 29 % (ref 14–44)
MCH RBC QN AUTO: 32.8 PG (ref 26.8–34.3)
MCHC RBC AUTO-ENTMCNC: 32.4 G/DL (ref 31.4–37.4)
MCV RBC AUTO: 101 FL (ref 82–98)
MONOCYTES # BLD AUTO: 0.58 THOUSAND/ΜL (ref 0.17–1.22)
MONOCYTES NFR BLD AUTO: 11 % (ref 4–12)
NEUTROPHILS # BLD AUTO: 3.06 THOUSANDS/ΜL (ref 1.85–7.62)
NEUTS SEG NFR BLD AUTO: 56 % (ref 43–75)
NITRITE UR QL STRIP: NEGATIVE
NONHDLC SERPL-MCNC: 93 MG/DL
NRBC BLD AUTO-RTO: 0 /100 WBCS
PH UR STRIP.AUTO: 6 [PH] (ref 4.5–8)
PLATELET # BLD AUTO: 175 THOUSANDS/UL (ref 149–390)
PMV BLD AUTO: 11.1 FL (ref 8.9–12.7)
POTASSIUM SERPL-SCNC: 4.8 MMOL/L (ref 3.5–5.3)
PROT SERPL-MCNC: 7.3 G/DL (ref 6.4–8.2)
PROT UR STRIP-MCNC: NEGATIVE MG/DL
PSA SERPL-MCNC: 1.2 NG/ML (ref 0–4)
RBC # BLD AUTO: 4.7 MILLION/UL (ref 3.88–5.62)
SODIUM SERPL-SCNC: 137 MMOL/L (ref 136–145)
SP GR UR STRIP.AUTO: 1.01 (ref 1–1.03)
TRIGL SERPL-MCNC: 78 MG/DL
UROBILINOGEN UR QL STRIP.AUTO: 0.2 E.U./DL
WBC # BLD AUTO: 5.45 THOUSAND/UL (ref 4.31–10.16)

## 2018-11-14 PROCEDURE — 80053 COMPREHEN METABOLIC PANEL: CPT

## 2018-11-14 PROCEDURE — 80061 LIPID PANEL: CPT

## 2018-11-14 PROCEDURE — 85025 COMPLETE CBC W/AUTO DIFF WBC: CPT

## 2018-11-14 PROCEDURE — 36415 COLL VENOUS BLD VENIPUNCTURE: CPT

## 2018-11-14 PROCEDURE — G0103 PSA SCREENING: HCPCS

## 2018-11-14 PROCEDURE — 81003 URINALYSIS AUTO W/O SCOPE: CPT

## 2018-11-16 ENCOUNTER — TELEPHONE (OUTPATIENT)
Dept: FAMILY MEDICINE CLINIC | Facility: CLINIC | Age: 78
End: 2018-11-16

## 2018-11-16 DIAGNOSIS — I10 ESSENTIAL HYPERTENSION: ICD-10-CM

## 2018-11-16 DIAGNOSIS — I10 HYPERTENSION, UNSPECIFIED TYPE: ICD-10-CM

## 2018-11-16 RX ORDER — EZETIMIBE AND SIMVASTATIN 10; 20 MG/1; MG/1
1 TABLET ORAL DAILY
Qty: 90 TABLET | Refills: 3 | Status: SHIPPED | OUTPATIENT
Start: 2018-11-16 | End: 2018-11-27 | Stop reason: SDUPTHER

## 2018-11-16 RX ORDER — LISINOPRIL 10 MG/1
10 TABLET ORAL DAILY
Qty: 90 TABLET | Refills: 3 | Status: SHIPPED | OUTPATIENT
Start: 2018-11-16 | End: 2019-10-02 | Stop reason: SDUPTHER

## 2018-11-16 NOTE — TELEPHONE ENCOUNTER
Patient would like refill of Vytorin 10-20 mg tab take 1 daily and Lisinopril 10 mg tab take 1 daily both 90 day supply with 3 refills sent to [facility]

## 2018-11-27 DIAGNOSIS — I10 HYPERTENSION, UNSPECIFIED TYPE: ICD-10-CM

## 2018-11-27 RX ORDER — EZETIMIBE AND SIMVASTATIN 10; 20 MG/1; MG/1
1 TABLET ORAL DAILY
Qty: 90 TABLET | Refills: 3 | Status: SHIPPED | OUTPATIENT
Start: 2018-11-27 | End: 2019-10-02 | Stop reason: SDUPTHER

## 2019-04-22 ENCOUNTER — OFFICE VISIT (OUTPATIENT)
Dept: URGENT CARE | Facility: CLINIC | Age: 79
End: 2019-04-22
Payer: MEDICARE

## 2019-04-22 ENCOUNTER — TRANSCRIBE ORDERS (OUTPATIENT)
Dept: URGENT CARE | Facility: CLINIC | Age: 79
End: 2019-04-22

## 2019-04-22 DIAGNOSIS — G56.02 CARPAL TUNNEL SYNDROME ON LEFT: ICD-10-CM

## 2019-04-22 DIAGNOSIS — G56.02 CARPAL TUNNEL SYNDROME ON LEFT: Primary | ICD-10-CM

## 2019-04-22 LAB
ATRIAL RATE: 64 BPM
P AXIS: 52 DEGREES
PR INTERVAL: 176 MS
QRS AXIS: -46 DEGREES
QRSD INTERVAL: 96 MS
QT INTERVAL: 404 MS
QTC INTERVAL: 416 MS
T WAVE AXIS: 22 DEGREES
VENTRICULAR RATE: 64 BPM

## 2019-04-22 PROCEDURE — 93010 ELECTROCARDIOGRAM REPORT: CPT | Performed by: INTERNAL MEDICINE

## 2019-04-24 ENCOUNTER — TELEPHONE (OUTPATIENT)
Dept: OBGYN CLINIC | Facility: HOSPITAL | Age: 79
End: 2019-04-24

## 2019-04-25 ENCOUNTER — HOSPITAL ENCOUNTER (OUTPATIENT)
Facility: HOSPITAL | Age: 79
Setting detail: OUTPATIENT SURGERY
Discharge: HOME | End: 2019-04-25
Attending: ORTHOPAEDIC SURGERY | Admitting: ORTHOPAEDIC SURGERY
Payer: MEDICARE

## 2019-04-25 ENCOUNTER — ANESTHESIA EVENT (OUTPATIENT)
Dept: PERIOP | Facility: HOSPITAL | Age: 79
End: 2019-04-25
Payer: MEDICARE

## 2019-04-25 ENCOUNTER — ANESTHESIA (OUTPATIENT)
Dept: PERIOP | Facility: HOSPITAL | Age: 79
End: 2019-04-25
Payer: MEDICARE

## 2019-04-25 VITALS
WEIGHT: 150 LBS | BODY MASS INDEX: 22.73 KG/M2 | HEIGHT: 68 IN | HEART RATE: 70 BPM | RESPIRATION RATE: 18 BRPM | SYSTOLIC BLOOD PRESSURE: 123 MMHG | TEMPERATURE: 97.4 F | DIASTOLIC BLOOD PRESSURE: 59 MMHG | OXYGEN SATURATION: 100 %

## 2019-04-25 DIAGNOSIS — G56.02 CARPAL TUNNEL SYNDROME ON LEFT: Primary | ICD-10-CM

## 2019-04-25 DIAGNOSIS — M65.352 TRIGGER FINGER, LEFT LITTLE FINGER: ICD-10-CM

## 2019-04-25 PROCEDURE — 26055 INCISE FINGER TENDON SHEATH: CPT | Performed by: ORTHOPAEDIC SURGERY

## 2019-04-25 PROCEDURE — 99024 POSTOP FOLLOW-UP VISIT: CPT | Performed by: ORTHOPAEDIC SURGERY

## 2019-04-25 PROCEDURE — 64721 CARPAL TUNNEL SURGERY: CPT | Performed by: ORTHOPAEDIC SURGERY

## 2019-04-25 RX ORDER — HYDROMORPHONE HCL/PF 1 MG/ML
0.5 SYRINGE (ML) INJECTION
Status: DISCONTINUED | OUTPATIENT
Start: 2019-04-25 | End: 2019-04-25 | Stop reason: HOSPADM

## 2019-04-25 RX ORDER — SENNOSIDES 8.6 MG
650 CAPSULE ORAL EVERY 8 HOURS
Qty: 15 TABLET | Refills: 0 | Status: SHIPPED | OUTPATIENT
Start: 2019-04-25 | End: 2019-04-30

## 2019-04-25 RX ORDER — FENTANYL CITRATE 50 UG/ML
INJECTION, SOLUTION INTRAMUSCULAR; INTRAVENOUS AS NEEDED
Status: DISCONTINUED | OUTPATIENT
Start: 2019-04-25 | End: 2019-04-25 | Stop reason: SURG

## 2019-04-25 RX ORDER — LIDOCAINE HYDROCHLORIDE AND EPINEPHRINE 10; 10 MG/ML; UG/ML
INJECTION, SOLUTION INFILTRATION; PERINEURAL AS NEEDED
Status: DISCONTINUED | OUTPATIENT
Start: 2019-04-25 | End: 2019-04-25 | Stop reason: HOSPADM

## 2019-04-25 RX ORDER — FLUMAZENIL 0.1 MG/ML
INJECTION, SOLUTION INTRAVENOUS AS NEEDED
Status: DISCONTINUED | OUTPATIENT
Start: 2019-04-25 | End: 2019-04-25 | Stop reason: SURG

## 2019-04-25 RX ORDER — CLINDAMYCIN PHOSPHATE 900 MG/50ML
INJECTION INTRAVENOUS AS NEEDED
Status: DISCONTINUED | OUTPATIENT
Start: 2019-04-25 | End: 2019-04-25 | Stop reason: SURG

## 2019-04-25 RX ORDER — FENTANYL CITRATE/PF 50 MCG/ML
25 SYRINGE (ML) INJECTION
Status: DISCONTINUED | OUTPATIENT
Start: 2019-04-25 | End: 2019-04-25 | Stop reason: HOSPADM

## 2019-04-25 RX ORDER — HYDROCODONE BITARTRATE AND ACETAMINOPHEN 5; 325 MG/1; MG/1
1 TABLET ORAL EVERY 6 HOURS PRN
Qty: 10 TABLET | Refills: 0 | Status: SHIPPED | OUTPATIENT
Start: 2019-04-25 | End: 2019-05-28 | Stop reason: ALTCHOICE

## 2019-04-25 RX ORDER — SODIUM CHLORIDE, SODIUM LACTATE, POTASSIUM CHLORIDE, CALCIUM CHLORIDE 600; 310; 30; 20 MG/100ML; MG/100ML; MG/100ML; MG/100ML
75 INJECTION, SOLUTION INTRAVENOUS CONTINUOUS
Status: DISCONTINUED | OUTPATIENT
Start: 2019-04-25 | End: 2019-04-25

## 2019-04-25 RX ORDER — EPHEDRINE SULFATE 50 MG/ML
INJECTION INTRAVENOUS AS NEEDED
Status: DISCONTINUED | OUTPATIENT
Start: 2019-04-25 | End: 2019-04-25 | Stop reason: SURG

## 2019-04-25 RX ORDER — ONDANSETRON 2 MG/ML
INJECTION INTRAMUSCULAR; INTRAVENOUS AS NEEDED
Status: DISCONTINUED | OUTPATIENT
Start: 2019-04-25 | End: 2019-04-25 | Stop reason: SURG

## 2019-04-25 RX ORDER — NAPROXEN SODIUM 220 MG
220 TABLET ORAL 2 TIMES DAILY WITH MEALS
Qty: 10 TABLET | Refills: 0 | Status: SHIPPED | OUTPATIENT
Start: 2019-04-25 | End: 2019-11-14

## 2019-04-25 RX ORDER — MIDAZOLAM HYDROCHLORIDE 1 MG/ML
INJECTION INTRAMUSCULAR; INTRAVENOUS AS NEEDED
Status: DISCONTINUED | OUTPATIENT
Start: 2019-04-25 | End: 2019-04-25 | Stop reason: SURG

## 2019-04-25 RX ORDER — CLINDAMYCIN PHOSPHATE 900 MG/50ML
900 INJECTION INTRAVENOUS ONCE
Status: DISCONTINUED | OUTPATIENT
Start: 2019-04-25 | End: 2019-04-25 | Stop reason: HOSPADM

## 2019-04-25 RX ORDER — PROPOFOL 10 MG/ML
INJECTION, EMULSION INTRAVENOUS AS NEEDED
Status: DISCONTINUED | OUTPATIENT
Start: 2019-04-25 | End: 2019-04-25 | Stop reason: SURG

## 2019-04-25 RX ORDER — MAGNESIUM HYDROXIDE 1200 MG/15ML
LIQUID ORAL AS NEEDED
Status: DISCONTINUED | OUTPATIENT
Start: 2019-04-25 | End: 2019-04-25 | Stop reason: HOSPADM

## 2019-04-25 RX ADMIN — FENTANYL CITRATE 25 MCG: 50 INJECTION, SOLUTION INTRAMUSCULAR; INTRAVENOUS at 09:07

## 2019-04-25 RX ADMIN — MIDAZOLAM 1 MG: 1 INJECTION INTRAMUSCULAR; INTRAVENOUS at 08:43

## 2019-04-25 RX ADMIN — SODIUM CHLORIDE, SODIUM LACTATE, POTASSIUM CHLORIDE, AND CALCIUM CHLORIDE: .6; .31; .03; .02 INJECTION, SOLUTION INTRAVENOUS at 08:40

## 2019-04-25 RX ADMIN — FENTANYL CITRATE 25 MCG: 50 INJECTION, SOLUTION INTRAMUSCULAR; INTRAVENOUS at 08:55

## 2019-04-25 RX ADMIN — MIDAZOLAM 1 MG: 1 INJECTION INTRAMUSCULAR; INTRAVENOUS at 08:41

## 2019-04-25 RX ADMIN — EPHEDRINE SULFATE 5 MG: 50 INJECTION, SOLUTION INTRAVENOUS at 09:00

## 2019-04-25 RX ADMIN — FLUMAZENIL 0.2 MG: 0.1 INJECTION, SOLUTION INTRAVENOUS at 09:42

## 2019-04-25 RX ADMIN — CLINDAMYCIN IN 5 PERCENT DEXTROSE 900 MG: 18 INJECTION, SOLUTION INTRAVENOUS at 08:41

## 2019-04-25 RX ADMIN — SODIUM CHLORIDE, SODIUM LACTATE, POTASSIUM CHLORIDE, AND CALCIUM CHLORIDE 75 ML/HR: .6; .31; .03; .02 INJECTION, SOLUTION INTRAVENOUS at 07:45

## 2019-04-25 RX ADMIN — PROPOFOL 150 MG: 10 INJECTION, EMULSION INTRAVENOUS at 08:44

## 2019-04-25 RX ADMIN — EPHEDRINE SULFATE 10 MG: 50 INJECTION, SOLUTION INTRAVENOUS at 09:28

## 2019-04-25 RX ADMIN — EPHEDRINE SULFATE 5 MG: 50 INJECTION, SOLUTION INTRAVENOUS at 09:01

## 2019-04-25 RX ADMIN — ONDANSETRON 4 MG: 2 INJECTION INTRAMUSCULAR; INTRAVENOUS at 09:00

## 2019-05-06 ENCOUNTER — OFFICE VISIT (OUTPATIENT)
Dept: OBGYN CLINIC | Facility: CLINIC | Age: 79
End: 2019-05-06

## 2019-05-06 VITALS
HEART RATE: 64 BPM | SYSTOLIC BLOOD PRESSURE: 155 MMHG | HEIGHT: 68 IN | DIASTOLIC BLOOD PRESSURE: 81 MMHG | BODY MASS INDEX: 23.46 KG/M2 | WEIGHT: 154.8 LBS

## 2019-05-06 DIAGNOSIS — M65.352 TRIGGER FINGER, LEFT LITTLE FINGER: Primary | ICD-10-CM

## 2019-05-06 PROCEDURE — 99024 POSTOP FOLLOW-UP VISIT: CPT | Performed by: ORTHOPAEDIC SURGERY

## 2019-05-28 ENCOUNTER — OFFICE VISIT (OUTPATIENT)
Dept: FAMILY MEDICINE CLINIC | Facility: CLINIC | Age: 79
End: 2019-05-28
Payer: MEDICARE

## 2019-05-28 VITALS
TEMPERATURE: 98.6 F | DIASTOLIC BLOOD PRESSURE: 78 MMHG | RESPIRATION RATE: 18 BRPM | HEIGHT: 68 IN | WEIGHT: 153 LBS | SYSTOLIC BLOOD PRESSURE: 136 MMHG | HEART RATE: 78 BPM | BODY MASS INDEX: 23.19 KG/M2

## 2019-05-28 DIAGNOSIS — R05.9 COUGH: ICD-10-CM

## 2019-05-28 DIAGNOSIS — J32.9 SINUSITIS, UNSPECIFIED CHRONICITY, UNSPECIFIED LOCATION: Primary | ICD-10-CM

## 2019-05-28 PROCEDURE — 99213 OFFICE O/P EST LOW 20 MIN: CPT | Performed by: FAMILY MEDICINE

## 2019-05-28 RX ORDER — PREDNISONE 10 MG/1
TABLET ORAL
Qty: 21 TABLET | Refills: 0 | Status: SHIPPED | OUTPATIENT
Start: 2019-05-28 | End: 2019-11-14

## 2019-05-28 RX ORDER — DOXYCYCLINE 100 MG/1
100 TABLET ORAL 2 TIMES DAILY
Qty: 14 TABLET | Refills: 0 | Status: SHIPPED | OUTPATIENT
Start: 2019-05-28 | End: 2019-06-04

## 2019-05-30 ENCOUNTER — TELEPHONE (OUTPATIENT)
Dept: FAMILY MEDICINE CLINIC | Facility: CLINIC | Age: 79
End: 2019-05-30

## 2019-07-29 ENCOUNTER — OFFICE VISIT (OUTPATIENT)
Dept: OBGYN CLINIC | Facility: CLINIC | Age: 79
End: 2019-07-29
Payer: MEDICARE

## 2019-07-29 VITALS
BODY MASS INDEX: 23.22 KG/M2 | DIASTOLIC BLOOD PRESSURE: 82 MMHG | SYSTOLIC BLOOD PRESSURE: 126 MMHG | WEIGHT: 153.2 LBS | HEIGHT: 68 IN

## 2019-07-29 DIAGNOSIS — Z47.89 AFTERCARE FOLLOWING SURGERY OF THE MUSCULOSKELETAL SYSTEM: Primary | ICD-10-CM

## 2019-07-29 DIAGNOSIS — M79.642 PAIN OF LEFT HAND: ICD-10-CM

## 2019-07-29 PROCEDURE — 99213 OFFICE O/P EST LOW 20 MIN: CPT | Performed by: ORTHOPAEDIC SURGERY

## 2019-07-29 PROCEDURE — 1124F ACP DISCUSS-NO DSCNMKR DOCD: CPT | Performed by: ORTHOPAEDIC SURGERY
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
## 2019-10-02 DIAGNOSIS — I10 ESSENTIAL HYPERTENSION: ICD-10-CM

## 2019-10-02 DIAGNOSIS — I10 HYPERTENSION, UNSPECIFIED TYPE: ICD-10-CM

## 2019-10-02 RX ORDER — EZETIMIBE AND SIMVASTATIN 10; 20 MG/1; MG/1
1 TABLET ORAL DAILY
Qty: 90 TABLET | Refills: 3 | Status: SHIPPED | OUTPATIENT
Start: 2019-10-02 | End: 2020-08-25 | Stop reason: SDUPTHER

## 2019-10-02 RX ORDER — LISINOPRIL 10 MG/1
10 TABLET ORAL DAILY
Qty: 90 TABLET | Refills: 3 | Status: SHIPPED | OUTPATIENT
Start: 2019-10-02 | End: 2020-08-25 | Stop reason: SDUPTHER

## 2019-10-02 NOTE — TELEPHONE ENCOUNTER
lisinopril (ZESTRIL) 10 mg tablet  ezetimibe-simvastatin (VYTORIN) 10-20 mg per tablet    BOTH FOR #90 WITH 3 REFILLS PER INSURANCE    [facility]   [phone]  ANY QUESTIONS

## 2019-10-15 ENCOUNTER — IMMUNIZATIONS (OUTPATIENT)
Dept: FAMILY MEDICINE CLINIC | Facility: CLINIC | Age: 79
End: 2019-10-15
Payer: MEDICARE

## 2019-10-15 DIAGNOSIS — Z23 ENCOUNTER FOR IMMUNIZATION: ICD-10-CM

## 2019-10-15 PROCEDURE — 90662 IIV NO PRSV INCREASED AG IM: CPT

## 2019-10-15 PROCEDURE — G0008 ADMIN INFLUENZA VIRUS VAC: HCPCS

## 2019-11-04 ENCOUNTER — OFFICE VISIT (OUTPATIENT)
Dept: OBGYN CLINIC | Facility: CLINIC | Age: 79
End: 2019-11-04
Payer: MEDICARE

## 2019-11-04 ENCOUNTER — TRANSCRIBE ORDERS (OUTPATIENT)
Dept: ADMINISTRATIVE | Facility: HOSPITAL | Age: 79
End: 2019-11-04

## 2019-11-04 VITALS
WEIGHT: 156 LBS | BODY MASS INDEX: 23.64 KG/M2 | HEIGHT: 68 IN | DIASTOLIC BLOOD PRESSURE: 80 MMHG | SYSTOLIC BLOOD PRESSURE: 140 MMHG

## 2019-11-04 DIAGNOSIS — R22.32 FINGER MASS, LEFT: Primary | ICD-10-CM

## 2019-11-04 PROCEDURE — 99214 OFFICE O/P EST MOD 30 MIN: CPT | Performed by: ORTHOPAEDIC SURGERY

## 2019-11-04 NOTE — PROGRESS NOTES
ASSESSMENT/PLAN:    Assessment:   Left  small finger mass    Plan:   Ultrasound left small finger    Follow Up: After Testing    To Do Next Visit:  Re-evaluation and possible surgical scheduling for mass remove    _____________________________________________________  CHIEF COMPLAINT:  Chief Complaint   Patient presents with    Left Little Finger - Swelling, Pain, Locking         SUBJECTIVE:  Gustavo Florian is a 70 y o  male who presents for left small finger pain  Patient underwent open carpal tunnel release on 04/25/2019 and also had a trigger finger release of the left small finger at the same time  Patient states that 1 week ago while traveling, he began to experience tenderness to palpation to the base of the left small finger  Since that time, he has also been unable to form a composite fist which includes the left small finger  He denies any signs of systemic illness including fever, chills, nausea or vomiting  He has tried no conservative measures as of yet  No other complaints      PAST MEDICAL HISTORY:  Past Medical History:   Diagnosis Date    Chronic sinusitis     Last Assessed:3/26/14    Degeneration of cervical intervertebral disc     Last Assessed:3/26/14    Derangement of unspecified medial meniscus due to old tear or injury, left knee     Last Assessed:4/10/14    Erythema migrans (Lyme disease)     Last Assessed:5/22/13    Eustachian tube dysfunction     Last Assessed:9/29/15    Hyperlipidemia     Hypertension     Osteoarthritis of wrist     Last Assessed:5/13/15    Primary osteoarthritis of left knee     Last Assessed:7/16/14    Spondylosis of cervical region without myelopathy or radiculopathy     Last Assessed:3/31/14    TMJ syndrome     Last Assessed:12/30/14    Trigger finger of left hand     Last Assessed:5/13/15    Trigger finger of right hand     Little finger, middle finger       PAST SURGICAL HISTORY:  Past Surgical History:   Procedure Laterality Date    COLONOSCOPY  KNEE SURGERY      NEUROPLASTY / TRANSPOSITION MEDIAN NERVE AT CARPAL TUNNEL      NE INCISE FINGER TENDON SHEATH Left 4/25/2019    Procedure: RELEASE TRIGGER FINGER - LEFT SMALL;  Surgeon: Bobby Dior MD;  Location: [facility];  Service: Orthopedics    NE REVISE MEDIAN N/CARPAL TUNNEL SURG Left 4/25/2019    Procedure: RELEASE CARPAL TUNNEL OPEN;  Surgeon: Bobby Dior MD;  Location: [facility];  Service: Orthopedics    SHOULDER ARTHROSCOPY Bilateral     SHOULDER SURGERY      TONSILLECTOMY         FAMILY HISTORY:  Family History   Problem Relation Age of Onset    Heart disease Mother         Coronary heart disease    Dementia Mother     Heart disease Father         Coronary heart disease    Lung cancer Paternal Grandmother        SOCIAL HISTORY:  Social History     Tobacco Use    Smoking status: Former Smoker    Smokeless tobacco: Never Used   Substance Use Topics    Alcohol use: Yes     Comment: Social drinker/Denies alcohol causing problems    Drug use: No       MEDICATIONS:    Current Outpatient Medications:     aspirin 81 MG tablet, Take 1 tablet by mouth every other day  , Disp: , Rfl:     ezetimibe-simvastatin (VYTORIN) 10-20 mg per tablet, Take 1 tablet by mouth daily, Disp: 90 tablet, Rfl: 3    lisinopril (ZESTRIL) 10 mg tablet, Take 1 tablet (10 mg total) by mouth daily, Disp: 90 tablet, Rfl: 3    cyclobenzaprine (FLEXERIL) 10 mg tablet, Take 1 tablet (10 mg total) by mouth 2 (two) times a day (Patient not taking: Reported on 11/4/2019), Disp: 20 tablet, Rfl: 0    naproxen sodium (ALEVE) 220 MG tablet, Take 1 tablet (220 mg total) by mouth 2 (two) times a day with meals for 5 days (Patient not taking: Reported on 11/4/2019), Disp: 10 tablet, Rfl: 0    predniSONE 10 mg tablet, 6 tabs on Day 1,5 tabs on Day 2,4 tabs on Day 3,3 tabs on Day 4,2 tabs on  Day 5And 1 tab on Day 6 (Patient not taking: Reported on 11/4/2019), Disp: 21 tablet, Rfl: 0    Current Facility-Administered Medications:     methylPREDNISolone acetate (DEPO-MEDROL) injection 40 mg, 40 mg, Intramuscular, Once, Raymon Page, DO    ALLERGIES:  Allergies   Allergen Reactions    Azithromycin Other (See Comments)     Cannot recall allergy or reaction    Meperidine Other (See Comments)     Cannot recall allergy or reaction    Pseudoephedrine Other (See Comments)     Cannot recall allergy or reaction       REVIEW OF SYSTEMS:  Pertinent items are noted in HPI  A comprehensive review of systems was negative      LABS:  HgA1c: No results found for: HGBA1C  BMP:   Lab Results   Component Value Date    GLUCOSE 97 09/22/2015    CALCIUM 8 8 11/14/2018     09/22/2015    K 4 8 11/14/2018    CO2 27 11/14/2018     11/14/2018    BUN 15 11/14/2018    CREATININE 0 83 11/14/2018           _____________________________________________________  PHYSICAL EXAMINATION:  Vital signs: /80   Ht 5' 8" (1 727 m)   Wt 70 8 kg (156 lb)   BMI 23 72 kg/m²   General: well developed and well nourished, alert, oriented times 3 and appears comfortable  Psychiatric: Normal  HEENT: Trachea Midline, No torticollis  Cardiovascular: No discernable arrhythmia  Pulmonary: No wheezing or stridor  Skin: No masses, erythema, lacerations, fluctation, ulcerations  Neurovascular: Sensation Intact to the Median, Ulnar, Radial Nerve, Motor Intact to the Median, Ulnar, Radial Nerve and Pulses Intact    MUSCULOSKELETAL EXAMINATION:  Left small finger  · Skin intact  · Notable swelling compared to contralateral small finger  · Exquisite tenderness palpation over the volar aspect of the proximal phalanx  · Palpable nodule over volar aspect of proximal phalanx which is mobile  · Unable to form composite fist which would include the small finger  · No triggering appreciated over A1 pulley  · Motor and sensation otherwise intact    _____________________________________________________  STUDIES REVIEWED:  No Studies to review      PROCEDURES PERFORMED:  Procedures      I interviewed, took the history and examined the patient  I discuss the case with the resident and reviewed the resident's note  I supervised the resident and I agree with the resident management plan as it was presented to me  I was present in the clinic and examined the patient

## 2019-11-05 ENCOUNTER — HOSPITAL ENCOUNTER (OUTPATIENT)
Dept: ULTRASOUND IMAGING | Facility: HOSPITAL | Age: 79
Discharge: HOME | End: 2019-11-05

## 2019-11-05 DIAGNOSIS — R22.32 FINGER MASS, LEFT: ICD-10-CM

## 2019-11-07 ENCOUNTER — HOSPITAL ENCOUNTER (OUTPATIENT)
Dept: RADIOLOGY | Facility: HOSPITAL | Age: 79
Discharge: HOME | End: 2019-11-07
Payer: MEDICARE

## 2019-11-07 DIAGNOSIS — R22.32 FINGER MASS, LEFT: ICD-10-CM

## 2019-11-07 PROCEDURE — 76882 US LMTD JT/FCL EVL NVASC XTR: CPT

## 2019-11-08 ENCOUNTER — OFFICE VISIT (OUTPATIENT)
Dept: OBGYN CLINIC | Facility: CLINIC | Age: 79
End: 2019-11-08
Payer: MEDICARE

## 2019-11-08 ENCOUNTER — APPOINTMENT (OUTPATIENT)
Dept: RADIOLOGY | Facility: CLINIC | Age: 79
End: 2019-11-08
Payer: MEDICARE

## 2019-11-08 VITALS
WEIGHT: 153 LBS | BODY MASS INDEX: 23.19 KG/M2 | DIASTOLIC BLOOD PRESSURE: 78 MMHG | SYSTOLIC BLOOD PRESSURE: 140 MMHG | HEIGHT: 68 IN | HEART RATE: 80 BPM

## 2019-11-08 DIAGNOSIS — Z98.890 HISTORY OF REPAIR OF RIGHT ROTATOR CUFF: ICD-10-CM

## 2019-11-08 DIAGNOSIS — M25.511 RIGHT SHOULDER PAIN, UNSPECIFIED CHRONICITY: ICD-10-CM

## 2019-11-08 DIAGNOSIS — M75.21 BICEPS TENDONITIS ON RIGHT: Primary | ICD-10-CM

## 2019-11-08 PROCEDURE — 73030 X-RAY EXAM OF SHOULDER: CPT

## 2019-11-08 PROCEDURE — 20610 DRAIN/INJ JOINT/BURSA W/O US: CPT | Performed by: ORTHOPAEDIC SURGERY

## 2019-11-08 PROCEDURE — 99213 OFFICE O/P EST LOW 20 MIN: CPT | Performed by: ORTHOPAEDIC SURGERY

## 2019-11-08 RX ORDER — BETAMETHASONE SODIUM PHOSPHATE AND BETAMETHASONE ACETATE 3; 3 MG/ML; MG/ML
12 INJECTION, SUSPENSION INTRA-ARTICULAR; INTRALESIONAL; INTRAMUSCULAR; SOFT TISSUE
Status: COMPLETED | OUTPATIENT
Start: 2019-11-08 | End: 2019-11-08

## 2019-11-08 RX ORDER — LIDOCAINE HYDROCHLORIDE 10 MG/ML
3 INJECTION, SOLUTION EPIDURAL; INFILTRATION; INTRACAUDAL; PERINEURAL
Status: COMPLETED | OUTPATIENT
Start: 2019-11-08 | End: 2019-11-08

## 2019-11-08 RX ADMIN — LIDOCAINE HYDROCHLORIDE 3 ML: 10 INJECTION, SOLUTION EPIDURAL; INFILTRATION; INTRACAUDAL; PERINEURAL at 13:17

## 2019-11-08 RX ADMIN — BETAMETHASONE SODIUM PHOSPHATE AND BETAMETHASONE ACETATE 12 MG: 3; 3 INJECTION, SUSPENSION INTRA-ARTICULAR; INTRALESIONAL; INTRAMUSCULAR; SOFT TISSUE at 13:17

## 2019-11-08 NOTE — ASSESSMENT & PLAN NOTE
Patient's symptoms and exam correlate with biceps tendonitis, pain with cross arm abduction , all other planes full motion 5/5 strength  Offered and accepted cortisone injection, ice anterior shoulder over next few days, activity modification  Discussed the proximal biceps may have partial tearing which may lead to future rupture which would cause short term pain and liang deformity but no surgery would be needed  See back when he is back from [facility]) in spring if pain persists  Cold compress over the injection site  All patient's questions were answered to his satisfaction  This note is created using dictation transcription  It may contain typographical errors, grammatical errors, improperly dictated words, background noise and other errors

## 2019-11-08 NOTE — PROGRESS NOTES
Assessment:     1  Biceps tendonitis on right    2  History of repair of right rotator cuff        Plan:      Problem List Items Addressed This Visit        Musculoskeletal and Integument    Biceps tendonitis on right - Primary     Patient's symptoms and exam correlate with biceps tendonitis, pain with cross arm abduction , all other planes full motion 5/5 strength  Offered and accepted cortisone injection, ice anterior shoulder over next few days, activity modification  Discussed the proximal biceps may have partial tearing which may lead to future rupture which would cause short term pain and liang deformity but no surgery would be needed  See back when he is back from [facility] in spring if pain persists  Cold compress over the injection site  All patient's questions were answered to his satisfaction  This note is created using dictation transcription  It may contain typographical errors, grammatical errors, improperly dictated words, background noise and other errors  Relevant Medications    lidocaine (PF) (XYLOCAINE-MPF) 1 % injection 3 mL (Completed)    betamethasone acetate-betamethasone sodium phosphate (CELESTONE) injection 12 mg (Completed)    Other Relevant Orders    XR shoulder 2+ vw right    Large joint arthrocentesis (Completed)      Other Visit Diagnoses     History of repair of right rotator cuff             Subjective:     Patient ID: Maria Luisa Ace is a 70 y o  male  Chief Complaint:  70 yr old male in for evaluation of right shoulder pain  Has history right rotator cuff repair by Dr Panfilo Rodrigez 2012  Past week he has been having anterior shoulder pain in biceps region  Just woke up a week ago and went to reach for item and noted pain  Only has pain reaching across his body, all other motions no pain, no pain lifting items  He denies any weakness  Denies numbness or tingling in arm  Information on patient's intake form was reviewed      Allergy:  Allergies   Allergen Reactions    Azithromycin Other (See Comments)     Cannot recall allergy or reaction    Meperidine Other (See Comments)     Cannot recall allergy or reaction    Pseudoephedrine Other (See Comments)     Cannot recall allergy or reaction     Medications:  all current active meds have been reviewed  Past Medical History:  Past Medical History:   Diagnosis Date    Chronic sinusitis     Last Assessed:3/26/14    Degeneration of cervical intervertebral disc     Last Assessed:3/26/14    Derangement of unspecified medial meniscus due to old tear or injury, left knee     Last Assessed:4/10/14    Erythema migrans (Lyme disease)     Last Assessed:5/22/13    Eustachian tube dysfunction     Last Assessed:9/29/15    Hyperlipidemia     Hypertension     Osteoarthritis of wrist     Last Assessed:5/13/15    Primary osteoarthritis of left knee     Last Assessed:7/16/14    Spondylosis of cervical region without myelopathy or radiculopathy     Last Assessed:3/31/14    TMJ syndrome     Last Assessed:12/30/14    Trigger finger of left hand     Last Assessed:5/13/15    Trigger finger of right hand     Little finger, middle finger     Past Surgical History:  Past Surgical History:   Procedure Laterality Date    COLONOSCOPY      KNEE SURGERY      NEUROPLASTY / TRANSPOSITION MEDIAN NERVE AT CARPAL TUNNEL      TN INCISE FINGER TENDON SHEATH Left 4/25/2019    Procedure: RELEASE TRIGGER FINGER - LEFT SMALL;  Surgeon: Agustín Buenrostro MD;  [facility];  Service: Orthopedics    TN REVISE MEDIAN N/CARPAL TUNNEL SURG Left 4/25/2019    Procedure: RELEASE CARPAL TUNNEL OPEN;  Surgeon: Agustín Buenrostro MD;  [facility];  Service: Orthopedics    SHOULDER ARTHROSCOPY Bilateral     SHOULDER SURGERY      TONSILLECTOMY       Family History:  Family History   Problem Relation Age of Onset    Heart disease Mother         Coronary heart disease    Dementia Mother     Heart disease Father         Coronary heart disease    Lung cancer Paternal Grandmother      Social History:  Social History     Substance and Sexual Activity   Alcohol Use Yes    Comment: Social drinker/Denies alcohol causing problems     Social History     Substance and Sexual Activity   Drug Use No     Social History     Tobacco Use   Smoking Status Former Smoker   Smokeless Tobacco Never Used     Review of Systems   Constitutional: Negative for chills and fever  HENT: Negative for drooling and sneezing  Eyes: Negative for redness  Respiratory: Negative for cough, shortness of breath and wheezing  Cardiovascular: Negative  Gastrointestinal: Negative  Endocrine: Negative  Genitourinary: Negative  Musculoskeletal: Positive for arthralgias (Right shoulder)  Skin: Negative  Neurological: Negative  Hematological: Negative  Psychiatric/Behavioral: The patient is not nervous/anxious  Objective:  BP Readings from Last 1 Encounters:   11/08/19 140/78      Wt Readings from Last 1 Encounters:   11/08/19 69 4 kg (153 lb)      BMI:   Estimated body mass index is 23 26 kg/m² as calculated from the following:    Height as of this encounter: 5' 8" (1 727 m)  Weight as of this encounter: 69 4 kg (153 lb)  BSA:   Estimated body surface area is 1 82 meters squared as calculated from the following:    Height as of this encounter: 5' 8" (1 727 m)  Weight as of this encounter: 69 4 kg (153 lb)  Physical Exam   Constitutional: He is oriented to person, place, and time  He appears well-developed and well-nourished  HENT:   Head: Normocephalic and atraumatic  Eyes: Conjunctivae and EOM are normal    Neck: Neck supple  Pulmonary/Chest: Effort normal    Neurological: He is alert and oriented to person, place, and time  He has normal reflexes  Skin: Skin is warm and dry  Psychiatric: He has a normal mood and affect  His behavior is normal  Judgment and thought content normal    Nursing note and vitals reviewed      Right Shoulder Exam Tenderness   The patient is experiencing tenderness in the biceps tendon  Range of Motion   Active abduction: normal   Passive abduction: normal   Extension: normal   External rotation: normal   Forward flexion: normal     Muscle Strength   Abduction: 5/5   Internal rotation: 5/5   External rotation: 5/5     Tests   Flynn test: negative  Cross arm: positive  Impingement: negative  Drop arm: negative    Other   Erythema: absent  Scars: absent  Sensation: normal  Pulse: present    Comments:  Positive Speed test            I have personally reviewed pertinent films in PACS and my interpretation is mild degenerative changes, no osseus abnormalities       Large joint arthrocentesis  Date/Time: 11/8/2019 1:17 PM  Consent given by: patient  Site marked: site marked  Timeout: Immediately prior to procedure a time out was called to verify the correct patient, procedure, equipment, support staff and site/side marked as required   Supporting Documentation  Indications: pain   Procedure Details  Location: shoulder - Shoulder joint: biceps   Preparation: Patient was prepped and draped in the usual sterile fashion  Needle size: 22 G  Ultrasound guidance: no  Approach: anterior  Medications administered: 3 mL lidocaine (PF) 1 %; 12 mg betamethasone acetate-betamethasone sodium phosphate 6 (3-3) mg/mL    Patient tolerance: patient tolerated the procedure well with no immediate complications  Dressing:  Sterile dressing applied        Scribe Attestation    I,:   Quiana Prater am acting as a scribe while in the presence of the attending physician :        I,:   Lauryn Kerns MD personally performed the services described in this documentation    as scribed in my presence :

## 2019-11-14 ENCOUNTER — OFFICE VISIT (OUTPATIENT)
Dept: FAMILY MEDICINE CLINIC | Facility: CLINIC | Age: 79
End: 2019-11-14
Payer: MEDICARE

## 2019-11-14 VITALS
HEART RATE: 88 BPM | WEIGHT: 154 LBS | SYSTOLIC BLOOD PRESSURE: 144 MMHG | HEIGHT: 68 IN | DIASTOLIC BLOOD PRESSURE: 88 MMHG | TEMPERATURE: 97 F | BODY MASS INDEX: 23.34 KG/M2

## 2019-11-14 DIAGNOSIS — Z00.00 MEDICARE ANNUAL WELLNESS VISIT, SUBSEQUENT: Primary | ICD-10-CM

## 2019-11-14 DIAGNOSIS — Z12.5 SCREENING FOR PROSTATE CANCER: ICD-10-CM

## 2019-11-14 DIAGNOSIS — Z13.6 SCREENING FOR CARDIOVASCULAR CONDITION: ICD-10-CM

## 2019-11-14 PROCEDURE — G0439 PPPS, SUBSEQ VISIT: HCPCS | Performed by: FAMILY MEDICINE

## 2019-11-14 NOTE — PROGRESS NOTES
Assessment/Plan:     Diagnoses and all orders for this visit:    Medicare annual wellness visit, subsequent    Screening for cardiovascular condition  -     CBC and differential; Future  -     Comprehensive metabolic panel; Future  -     Lipid panel; Future    Screening for prostate cancer  -     PSA, Total Screen; Future  -     UA (URINE) with reflex to Scope      patient's blood pressure is running higher today  He took 2 his blood pressure medicine last night but he does have significant white coat hypertension  Patient has a couple monitor his blood pressures home  If he is consistently above 140/90 he will let me know  Otherwise he is up-to-date on his health maintenance  Labs ordered  He can follow up in 1 year sooner if needed      Subjective:     Chief Complaint   Patient presents with    Medicare Wellness Visit     medicare wellness 71 y/o male         Patient ID: Telma Barber is a 70 y o  male  Patient is here for Medicare wellness visit  He reports no acute physical complaints today and is feeling well      The following portions of the patient's history were reviewed and updated as appropriate: allergies, current medications, past family history, past medical history, past social history, past surgical history and problem list     Review of Systems   Constitutional: Negative  HENT: Negative  Eyes: Negative  Respiratory: Negative  Cardiovascular: Negative  Gastrointestinal: Negative  Endocrine: Negative  Genitourinary: Negative  Musculoskeletal: Negative  Skin: Negative  Allergic/Immunologic: Negative  Neurological: Negative  Hematological: Negative  Psychiatric/Behavioral: Negative  All other systems reviewed and are negative          Objective:    Vitals:    11/14/19 0936   BP: 144/88   BP Location: Left arm   Patient Position: Sitting   Cuff Size: Large   Pulse: 88   Temp: (!) 97 °F (36 1 °C)   TempSrc: Tympanic   Weight: 69 9 kg (154 lb)   Height: 5' 8" (1 727 m)          Physical Exam   Constitutional: He is oriented to person, place, and time  He appears well-developed and well-nourished  No distress  HENT:   Head: Normocephalic  Right Ear: External ear normal    Left Ear: External ear normal    Nose: Nose normal    Mouth/Throat: Oropharynx is clear and moist    Eyes: Pupils are equal, round, and reactive to light  Conjunctivae and EOM are normal  Right eye exhibits no discharge  Left eye exhibits no discharge  Neck: Normal range of motion  Cardiovascular: Normal rate, regular rhythm and normal heart sounds  Pulmonary/Chest: Effort normal and breath sounds normal    Abdominal: Soft  Bowel sounds are normal  He exhibits no distension  There is no tenderness  Musculoskeletal: Normal range of motion  Neurological: He is alert and oriented to person, place, and time  No cranial nerve deficit  Skin: Skin is warm and dry  No rash noted  Psychiatric: He has a normal mood and affect  His behavior is normal  Judgment and thought content normal    Nursing note and vitals reviewed  Assessment and Plan:     Problem List Items Addressed This Visit     None      Visit Diagnoses     Medicare annual wellness visit, subsequent    -  Primary    Screening for cardiovascular condition        Relevant Orders    CBC and differential    Comprehensive metabolic panel    Lipid panel    Screening for prostate cancer        Relevant Orders    PSA, Total Screen    UA (URINE) with reflex to Scope           Preventive health issues were discussed with patient, and age appropriate screening tests were ordered as noted in patient's After Visit Summary  Personalized health advice and appropriate referrals for health education or preventive services given if needed, as noted in patient's After Visit Summary       History of Present Illness:     Patient presents for Medicare Annual Wellness visit    Patient Care Team:  Ila Miles DO as PCP - General  Helton Jurist, MD Omar Landaverde MD     Problem List:     Patient Active Problem List   Diagnosis    Allergic rhinitis    Dermatitis, eczematoid    Generalized osteoarthritis    Hypertension    Hyperlipidemia    Numbness and tingling in left hand    Trigger finger, left little finger    Carpal tunnel syndrome of left wrist    Carpal tunnel syndrome on left    Finger mass, left    Biceps tendonitis on right      Past Medical and Surgical History:     Past Medical History:   Diagnosis Date    Chronic sinusitis     Last Assessed:3/26/14    Degeneration of cervical intervertebral disc     Last Assessed:3/26/14    Derangement of unspecified medial meniscus due to old tear or injury, left knee     Last Assessed:4/10/14    Erythema migrans (Lyme disease)     Last Assessed:5/22/13    Eustachian tube dysfunction     Last Assessed:9/29/15    Hyperlipidemia     Hypertension     Osteoarthritis of wrist     Last Assessed:5/13/15    Primary osteoarthritis of left knee     Last Assessed:7/16/14    Spondylosis of cervical region without myelopathy or radiculopathy     Last Assessed:3/31/14    TMJ syndrome     Last Assessed:12/30/14    Trigger finger of left hand     Last Assessed:5/13/15    Trigger finger of right hand     Little finger, middle finger     Past Surgical History:   Procedure Laterality Date    COLONOSCOPY      KNEE SURGERY      NEUROPLASTY / TRANSPOSITION MEDIAN NERVE AT CARPAL TUNNEL      [place] INCISE FINGER TENDON SHEATH Left 4/25/2019    Procedure: RELEASE TRIGGER FINGER - LEFT SMALL;  Surgeon: Olive Hanna MD;  [facility];  Service: Orthopedics    [place] REVISE MEDIAN N/CARPAL TUNNEL SURG Left 4/25/2019    Procedure: RELEASE CARPAL TUNNEL OPEN;  Surgeon: Olive Hanna MD;  [facility];  Service: Orthopedics    SHOULDER ARTHROSCOPY Bilateral     SHOULDER SURGERY      TONSILLECTOMY        Family History:     Family History   Problem Relation Age of Onset    Heart disease Mother         Coronary heart disease    Dementia Mother     Heart disease Father         Coronary heart disease    Lung cancer Paternal Grandmother       Social History:     Social History     Socioeconomic History    Marital status: /Civil Union     Spouse name: None    Number of children: None    Years of education: None    Highest education level: None   Occupational History    Occupation: Retired   Social Needs    Financial resource strain: Not hard at all   [place] insecurity:     Worry: Never true     Inability: Never true    Transportation needs:     Medical: No     Non-medical: No   Tobacco Use    Smoking status: Former Smoker    Smokeless tobacco: Never Used   Substance and Sexual Activity    Alcohol use: Yes     Frequency: 4 or more times a week     Drinks per session: Patient refused     Binge frequency: Patient refused     Comment: Social drinker/Denies alcohol causing problems    Drug use: No    Sexual activity: Yes     Partners: Female   Lifestyle    Physical activity:     Days per week: Patient refused     Minutes per session: Patient refused    Stress: Not at all   Relationships    Social connections:     Talks on phone:  Three times a week     Gets together: Once a week     Attends Restoration[employer] service: More than 4 times per year     Active member of club or organization: Yes     Attends meetings of clubs or organizations: Never     Relationship status:     Intimate partner violence:     Fear of current or ex partner: No     Emotionally abused: No     Physically abused: No     Forced sexual activity: No   Other Topics Concern    None   Social History Narrative    Exercises regularly       Medications and Allergies:     Current Outpatient Medications   Medication Sig Dispense Refill    aspirin 81 MG tablet Take 1 tablet by mouth every other day        ezetimibe-simvastatin (VYTORIN) 10-20 mg per tablet Take 1 tablet by mouth daily 90 tablet 3    lisinopril (ZESTRIL) 10 mg tablet Take 1 tablet (10 mg total) by mouth daily 90 tablet 3     Current Facility-Administered Medications   Medication Dose Route Frequency Provider Last Rate Last Dose    methylPREDNISolone acetate (DEPO-MEDROL) injection 40 mg  40 mg Intramuscular Once Raymon Page, DO         Allergies   Allergen Reactions    Azithromycin Other (See Comments)     Cannot recall allergy or reaction    Meperidine Other (See Comments)     Cannot recall allergy or reaction    Pseudoephedrine Other (See Comments)     Cannot recall allergy or reaction      Immunizations:     Immunization History   Administered Date(s) Administered    Influenza Split High Dose Preservative Free IM 09/20/2012, 10/07/2013, 09/30/2014, 09/22/2015, 09/07/2016, 10/04/2017    Influenza, high dose seasonal 0 5 mL 09/25/2018, 10/15/2019    Pneumococcal Conjugate 13-Valent 11/21/2016    Pneumococcal Polysaccharide PPV23 09/19/2011, 05/17/2016    TD (adult) Preservative Free 05/20/2015, 12/20/2016    Td (adult), adsorbed 05/25/2005    Zoster 12/03/2017    Zoster Vaccine Recombinant 04/08/2019, 06/10/2019      Health Maintenance:         Topic Date Due    CRC Screening: Colonoscopy  12/19/2021     There are no preventive care reminders to display for this patient  Medicare Health Risk Assessment:     /88 (BP Location: Left arm, Patient Position: Sitting, Cuff Size: Large)   Pulse 88   Temp (!) 97 °F (36 1 °C) (Tympanic)   Ht 5' 8" (1 727 m)   Wt 69 9 kg (154 lb)   BMI 23 42 kg/m²      Ro Hook is here for his Subsequent Wellness visit  Last Medicare Wellness visit information reviewed, patient interviewed, no change since last AWV  Health Risk Assessment:   Patient rates overall health as excellent  Patient feels that their physical health rating is slightly better  Eyesight was rated as same  Hearing was rated as same  Patient feels that their emotional and mental health rating is same   Pain experienced in the last 7 days has been none  Patient states that he has experienced no weight loss or gain in last 6 months  Depression Screening:   PHQ-2 Score: 0      Fall Risk Screening: In the past year, patient has experienced: no history of falling in past year      Home Safety:  Patient does not have trouble with stairs inside or outside of their home  Patient has working smoke alarms and has working carbon monoxide detector  Home safety hazards include: none  Nutrition:   Current diet is Regular and Limited junk food  Medications:   Patient is currently taking over-the-counter supplements  OTC medications include: daily MV  Patient is able to manage medications  Activities of Daily Living (ADLs)/Instrumental Activities of Daily Living (IADLs):   Walk and transfer into and out of bed and chair?: Yes  Dress and groom yourself?: Yes    Bathe or shower yourself?: Yes    Feed yourself? Yes  Do your laundry/housekeeping?: Yes  Manage your money, pay your bills and track your expenses?: Yes  Make your own meals?: Yes    Do your own shopping?: Yes    Previous Hospitalizations:   Any hospitalizations or ED visits within the last 12 months?: No      Advance Care Planning:   Living will: Yes    Durable POA for healthcare:  Yes    Advanced directive: Yes    Advanced directive counseling given: Yes    Five wishes given: No    End of Life Decisions reviewed with patient: Yes    Provider agrees with end of life decisions: Yes      Cognitive Screening:   Provider or family/friend/caregiver concerned regarding cognition?: No    PREVENTIVE SCREENINGS      Cardiovascular Screening:    General: Screening Not Indicated, History Lipid Disorder and Screening Current      Diabetes Screening:     General: Screening Current      Colorectal Cancer Screening:     General: Screening Current      Prostate Cancer Screening:    General: Screening Current      Osteoporosis Screening:    General: Screening Not Indicated      Abdominal Aortic Aneurysm (AAA) Screening:    Risk factors include: tobacco use        General: Screening Not Indicated      Lung Cancer Screening:     General: Screening Not Indicated      Hepatitis C Screening:    General: Screening Not Indicated    Other Counseling Topics:   Alcohol use counseling, car/seat belt/driving safety, skin self-exam and calcium and vitamin D intake and regular weightbearing exercise         Raymon Page, DO

## 2019-11-26 ENCOUNTER — APPOINTMENT (OUTPATIENT)
Dept: LAB | Facility: CLINIC | Age: 79
End: 2019-11-26
Payer: MEDICARE

## 2019-11-26 DIAGNOSIS — Z13.6 SCREENING FOR CARDIOVASCULAR CONDITION: ICD-10-CM

## 2019-11-26 DIAGNOSIS — Z12.5 SCREENING FOR PROSTATE CANCER: ICD-10-CM

## 2019-11-26 LAB
ALBUMIN SERPL BCP-MCNC: 4.2 G/DL (ref 3.5–5)
ALP SERPL-CCNC: 60 U/L (ref 46–116)
ALT SERPL W P-5'-P-CCNC: 42 U/L (ref 12–78)
ANION GAP SERPL CALCULATED.3IONS-SCNC: 7 MMOL/L (ref 4–13)
AST SERPL W P-5'-P-CCNC: 36 U/L (ref 5–45)
BASOPHILS # BLD AUTO: 0.06 THOUSANDS/ΜL (ref 0–0.1)
BASOPHILS NFR BLD AUTO: 1 % (ref 0–1)
BILIRUB SERPL-MCNC: 0.99 MG/DL (ref 0.2–1)
BILIRUB UR QL STRIP: NEGATIVE
BUN SERPL-MCNC: 21 MG/DL (ref 5–25)
CALCIUM SERPL-MCNC: 9.7 MG/DL (ref 8.3–10.1)
CHLORIDE SERPL-SCNC: 105 MMOL/L (ref 100–108)
CHOLEST SERPL-MCNC: 172 MG/DL (ref 50–200)
CLARITY UR: CLEAR
CO2 SERPL-SCNC: 25 MMOL/L (ref 21–32)
COLOR UR: YELLOW
CREAT SERPL-MCNC: 0.9 MG/DL (ref 0.6–1.3)
EOSINOPHIL # BLD AUTO: 0.15 THOUSAND/ΜL (ref 0–0.61)
EOSINOPHIL NFR BLD AUTO: 2 % (ref 0–6)
ERYTHROCYTE [DISTWIDTH] IN BLOOD BY AUTOMATED COUNT: 12.9 % (ref 11.6–15.1)
GFR SERPL CREATININE-BSD FRML MDRD: 82 ML/MIN/1.73SQ M
GLUCOSE P FAST SERPL-MCNC: 101 MG/DL (ref 65–99)
GLUCOSE UR STRIP-MCNC: NEGATIVE MG/DL
HCT VFR BLD AUTO: 48.5 % (ref 36.5–49.3)
HDLC SERPL-MCNC: 60 MG/DL
HGB BLD-MCNC: 15.9 G/DL (ref 12–17)
HGB UR QL STRIP.AUTO: NEGATIVE
IMM GRANULOCYTES # BLD AUTO: 0.02 THOUSAND/UL (ref 0–0.2)
IMM GRANULOCYTES NFR BLD AUTO: 0 % (ref 0–2)
KETONES UR STRIP-MCNC: NEGATIVE MG/DL
LDLC SERPL CALC-MCNC: 95 MG/DL (ref 0–100)
LEUKOCYTE ESTERASE UR QL STRIP: NEGATIVE
LYMPHOCYTES # BLD AUTO: 1.74 THOUSANDS/ΜL (ref 0.6–4.47)
LYMPHOCYTES NFR BLD AUTO: 25 % (ref 14–44)
MCH RBC QN AUTO: 32.9 PG (ref 26.8–34.3)
MCHC RBC AUTO-ENTMCNC: 32.8 G/DL (ref 31.4–37.4)
MCV RBC AUTO: 100 FL (ref 82–98)
MONOCYTES # BLD AUTO: 0.63 THOUSAND/ΜL (ref 0.17–1.22)
MONOCYTES NFR BLD AUTO: 9 % (ref 4–12)
NEUTROPHILS # BLD AUTO: 4.29 THOUSANDS/ΜL (ref 1.85–7.62)
NEUTS SEG NFR BLD AUTO: 63 % (ref 43–75)
NITRITE UR QL STRIP: NEGATIVE
NONHDLC SERPL-MCNC: 112 MG/DL
NRBC BLD AUTO-RTO: 0 /100 WBCS
PH UR STRIP.AUTO: 6 [PH]
PLATELET # BLD AUTO: 217 THOUSANDS/UL (ref 149–390)
PMV BLD AUTO: 10.3 FL (ref 8.9–12.7)
POTASSIUM SERPL-SCNC: 5.2 MMOL/L (ref 3.5–5.3)
PROT SERPL-MCNC: 7.6 G/DL (ref 6.4–8.2)
PROT UR STRIP-MCNC: NEGATIVE MG/DL
PSA SERPL-MCNC: 1.8 NG/ML (ref 0–4)
RBC # BLD AUTO: 4.83 MILLION/UL (ref 3.88–5.62)
SODIUM SERPL-SCNC: 137 MMOL/L (ref 136–145)
SP GR UR STRIP.AUTO: 1.01 (ref 1–1.03)
TRIGL SERPL-MCNC: 85 MG/DL
UROBILINOGEN UR QL STRIP.AUTO: 0.2 E.U./DL
WBC # BLD AUTO: 6.89 THOUSAND/UL (ref 4.31–10.16)

## 2019-11-26 PROCEDURE — 81003 URINALYSIS AUTO W/O SCOPE: CPT | Performed by: FAMILY MEDICINE

## 2019-11-26 PROCEDURE — 85025 COMPLETE CBC W/AUTO DIFF WBC: CPT

## 2019-11-26 PROCEDURE — 80061 LIPID PANEL: CPT

## 2019-11-26 PROCEDURE — 80053 COMPREHEN METABOLIC PANEL: CPT

## 2019-11-26 PROCEDURE — G0103 PSA SCREENING: HCPCS

## 2019-11-26 PROCEDURE — 36415 COLL VENOUS BLD VENIPUNCTURE: CPT

## 2019-12-30 ENCOUNTER — HOSPITAL ENCOUNTER (EMERGENCY)
Facility: HOSPITAL | Age: 79
Discharge: HOME | End: 2019-12-30
Attending: EMERGENCY MEDICINE | Admitting: EMERGENCY MEDICINE
Payer: MEDICARE

## 2019-12-30 ENCOUNTER — APPOINTMENT (EMERGENCY)
Dept: RADIOLOGY | Facility: HOSPITAL | Age: 79
End: 2019-12-30
Payer: MEDICARE

## 2019-12-30 ENCOUNTER — APPOINTMENT (EMERGENCY)
Dept: CT IMAGING | Facility: HOSPITAL | Age: 79
End: 2019-12-30
Payer: MEDICARE

## 2019-12-30 VITALS
SYSTOLIC BLOOD PRESSURE: 140 MMHG | DIASTOLIC BLOOD PRESSURE: 88 MMHG | HEART RATE: 95 BPM | WEIGHT: 150 LBS | BODY MASS INDEX: 22.73 KG/M2 | OXYGEN SATURATION: 91 % | RESPIRATION RATE: 18 BRPM | HEIGHT: 68 IN | TEMPERATURE: 99.8 F

## 2019-12-30 DIAGNOSIS — S09.90XA HEAD INJURY: Primary | ICD-10-CM

## 2019-12-30 DIAGNOSIS — S62.102A LEFT WRIST FRACTURE: ICD-10-CM

## 2019-12-30 DIAGNOSIS — S43.402A SPRAIN OF LEFT SHOULDER: ICD-10-CM

## 2019-12-30 DIAGNOSIS — S13.9XXA NECK SPRAIN: ICD-10-CM

## 2019-12-30 LAB
ABO GROUP BLD: NORMAL
ANION GAP BLD CALC-SCNC: 14 MMOL/L (ref 4–13)
APTT PPP: 29 SECONDS (ref 23–37)
BLD GP AB SCN SERPL QL: NEGATIVE
BUN BLD-MCNC: 13 MG/DL (ref 5–25)
CA-I BLD-SCNC: 1.23 MMOL/L (ref 1.12–1.32)
CHLORIDE BLD-SCNC: 103 MMOL/L (ref 100–108)
CREAT BLD-MCNC: 0.8 MG/DL (ref 0.6–1.3)
GFR SERPL CREATININE-BSD FRML MDRD: 86 ML/MIN/1.73SQ M
GLUCOSE SERPL-MCNC: 116 MG/DL (ref 65–140)
HCT VFR BLD CALC: 46 % (ref 36.5–49.3)
HGB BLDA-MCNC: 15.6 G/DL (ref 12–17)
INR PPP: 1.08 (ref 0.84–1.19)
PCO2 BLD: 28 MMOL/L (ref 21–32)
POTASSIUM BLD-SCNC: 4 MMOL/L (ref 3.5–5.3)
PROTHROMBIN TIME: 13.7 SECONDS (ref 11.6–14.5)
RH BLD: NEGATIVE
SODIUM BLD-SCNC: 139 MMOL/L (ref 136–145)
SPECIMEN EXPIRATION DATE: NORMAL
SPECIMEN SOURCE: ABNORMAL

## 2019-12-30 PROCEDURE — 86901 BLOOD TYPING SEROLOGIC RH(D): CPT | Performed by: EMERGENCY MEDICINE

## 2019-12-30 PROCEDURE — 96361 HYDRATE IV INFUSION ADD-ON: CPT

## 2019-12-30 PROCEDURE — 99285 EMERGENCY DEPT VISIT HI MDM: CPT | Performed by: EMERGENCY MEDICINE

## 2019-12-30 PROCEDURE — 99285 EMERGENCY DEPT VISIT HI MDM: CPT

## 2019-12-30 PROCEDURE — 90715 TDAP VACCINE 7 YRS/> IM: CPT | Performed by: EMERGENCY MEDICINE

## 2019-12-30 PROCEDURE — 85014 HEMATOCRIT: CPT

## 2019-12-30 PROCEDURE — 72125 CT NECK SPINE W/O DYE: CPT

## 2019-12-30 PROCEDURE — 74177 CT ABD & PELVIS W/CONTRAST: CPT

## 2019-12-30 PROCEDURE — 90471 IMMUNIZATION ADMIN: CPT

## 2019-12-30 PROCEDURE — 36415 COLL VENOUS BLD VENIPUNCTURE: CPT | Performed by: EMERGENCY MEDICINE

## 2019-12-30 PROCEDURE — 73110 X-RAY EXAM OF WRIST: CPT

## 2019-12-30 PROCEDURE — 96374 THER/PROPH/DIAG INJ IV PUSH: CPT

## 2019-12-30 PROCEDURE — 85610 PROTHROMBIN TIME: CPT | Performed by: EMERGENCY MEDICINE

## 2019-12-30 PROCEDURE — 96375 TX/PRO/DX INJ NEW DRUG ADDON: CPT

## 2019-12-30 PROCEDURE — 70450 CT HEAD/BRAIN W/O DYE: CPT

## 2019-12-30 PROCEDURE — 71260 CT THORAX DX C+: CPT

## 2019-12-30 PROCEDURE — 86900 BLOOD TYPING SEROLOGIC ABO: CPT | Performed by: EMERGENCY MEDICINE

## 2019-12-30 PROCEDURE — 73030 X-RAY EXAM OF SHOULDER: CPT

## 2019-12-30 PROCEDURE — 86850 RBC ANTIBODY SCREEN: CPT | Performed by: EMERGENCY MEDICINE

## 2019-12-30 PROCEDURE — 80047 BASIC METABLC PNL IONIZED CA: CPT

## 2019-12-30 PROCEDURE — 29125 APPL SHORT ARM SPLINT STATIC: CPT | Performed by: EMERGENCY MEDICINE

## 2019-12-30 PROCEDURE — 85730 THROMBOPLASTIN TIME PARTIAL: CPT | Performed by: EMERGENCY MEDICINE

## 2019-12-30 PROCEDURE — 93005 ELECTROCARDIOGRAM TRACING: CPT

## 2019-12-30 RX ORDER — NAPROXEN 500 MG/1
500 TABLET ORAL 2 TIMES DAILY WITH MEALS
Qty: 30 TABLET | Refills: 0 | Status: SHIPPED | OUTPATIENT
Start: 2019-12-30 | End: 2020-01-16 | Stop reason: ALTCHOICE

## 2019-12-30 RX ORDER — CYCLOBENZAPRINE HCL 10 MG
10 TABLET ORAL 2 TIMES DAILY PRN
Qty: 20 TABLET | Refills: 0 | Status: SHIPPED | OUTPATIENT
Start: 2019-12-30 | End: 2020-01-16 | Stop reason: ALTCHOICE

## 2019-12-30 RX ORDER — MORPHINE SULFATE 10 MG/ML
6 INJECTION, SOLUTION INTRAMUSCULAR; INTRAVENOUS ONCE
Status: COMPLETED | OUTPATIENT
Start: 2019-12-30 | End: 2019-12-30

## 2019-12-30 RX ADMIN — TETANUS TOXOID, REDUCED DIPHTHERIA TOXOID AND ACELLULAR PERTUSSIS VACCINE, ADSORBED 0.5 ML: 5; 2.5; 8; 8; 2.5 SUSPENSION INTRAMUSCULAR at 20:24

## 2019-12-30 RX ADMIN — MORPHINE SULFATE 2 MG: 2 INJECTION, SOLUTION INTRAMUSCULAR; INTRAVENOUS at 20:24

## 2019-12-30 RX ADMIN — MORPHINE SULFATE 6 MG: 10 INJECTION INTRAVENOUS at 22:41

## 2019-12-30 RX ADMIN — SODIUM CHLORIDE 500 ML: 0.9 INJECTION, SOLUTION INTRAVENOUS at 22:00

## 2019-12-30 RX ADMIN — IOHEXOL 100 ML: 350 INJECTION, SOLUTION INTRAVENOUS at 21:15

## 2019-12-31 ENCOUNTER — TELEPHONE (OUTPATIENT)
Dept: NEUROLOGY | Facility: CLINIC | Age: 79
End: 2019-12-31

## 2019-12-31 LAB
ATRIAL RATE: 105 BPM
P AXIS: 41 DEGREES
PR INTERVAL: 158 MS
QRS AXIS: -57 DEGREES
QRSD INTERVAL: 86 MS
QT INTERVAL: 338 MS
QTC INTERVAL: 446 MS
T WAVE AXIS: 38 DEGREES
VENTRICULAR RATE: 105 BPM

## 2019-12-31 PROCEDURE — 93010 ELECTROCARDIOGRAM REPORT: CPT | Performed by: INTERNAL MEDICINE

## 2019-12-31 NOTE — TELEPHONE ENCOUNTER
Best contact number for [id number]:[phone]    Emergency Contact name and number:    Referring provider:ER    Referring provider Telephone:________________    Primary Care Provider Name: Alexandria Lyle    Reason for Appointment/Dx:Head Injury    Neurology Location patient would like to be seen:    Order received? Yes                                                Records Received? No    Have you ever seen another Neurologist? No    Insurance Information    Insurance Name:Medicare A and B    ID/Policy #:    Secondary Insurance:[employer]    ID/Policy#: Workman's Comp/ Accident/ School  Information      Workman's Comp/Accident/School related?  No    If yes name of Insurance company:    Date of Injury:    Open Claim:No    [address] Name and Telephone Number:    Notes:Crow [place]res                   Appointment date:01/02/20 11:00am _____________________________

## 2019-12-31 NOTE — DISCHARGE INSTRUCTIONS
You have a fluid collection seen on your CT scan of the head which needs follow-up with Neurology call them and make an appointment within the next 1-2 weeks
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
## 2020-01-02 ENCOUNTER — CONSULT (OUTPATIENT)
Dept: NEUROLOGY | Facility: CLINIC | Age: 80
End: 2020-01-02
Payer: MEDICARE

## 2020-01-02 ENCOUNTER — OFFICE VISIT (OUTPATIENT)
Dept: OBGYN CLINIC | Facility: MEDICAL CENTER | Age: 80
End: 2020-01-02
Payer: MEDICARE

## 2020-01-02 VITALS
DIASTOLIC BLOOD PRESSURE: 86 MMHG | BODY MASS INDEX: 23.19 KG/M2 | HEIGHT: 68 IN | WEIGHT: 153 LBS | HEART RATE: 83 BPM | SYSTOLIC BLOOD PRESSURE: 142 MMHG

## 2020-01-02 VITALS
SYSTOLIC BLOOD PRESSURE: 148 MMHG | HEIGHT: 68 IN | WEIGHT: 159 LBS | BODY MASS INDEX: 24.1 KG/M2 | DIASTOLIC BLOOD PRESSURE: 82 MMHG

## 2020-01-02 DIAGNOSIS — S69.92XA SCAPHOLUNATE LIGAMENT INJURY WITH NO INSTABILITY, LEFT, INITIAL ENCOUNTER: ICD-10-CM

## 2020-01-02 DIAGNOSIS — S46.012A TRAUMATIC TEAR OF LEFT ROTATOR CUFF, UNSPECIFIED TEAR EXTENT, INITIAL ENCOUNTER: Primary | ICD-10-CM

## 2020-01-02 DIAGNOSIS — S09.90XD TRAUMATIC INJURY OF HEAD, SUBSEQUENT ENCOUNTER: Primary | ICD-10-CM

## 2020-01-02 DIAGNOSIS — G96.08 SUBDURAL HYGROMA: ICD-10-CM

## 2020-01-02 DIAGNOSIS — R29.2 ASYMMETRICAL DEEP TENDON REFLEXES: ICD-10-CM

## 2020-01-02 PROCEDURE — 99214 OFFICE O/P EST MOD 30 MIN: CPT | Performed by: ORTHOPAEDIC SURGERY

## 2020-01-02 PROCEDURE — 99204 OFFICE O/P NEW MOD 45 MIN: CPT | Performed by: PSYCHIATRY & NEUROLOGY

## 2020-01-02 NOTE — PROGRESS NOTES
Patient ID: Rosangela Cross is a 70 y o  male  Assessment/Plan:    No problem-specific Assessment & Plan notes found for this encounter  {Assess/PlanSmartLinks:38689}       Subjective:    HPI    {[facility] HPI texts:44675}    {Common ambulatory SmartLinks:34266}         Objective:    Height 5' 8" (1 727 m), weight 72 1 kg (159 lb)  Physical Exam    Neurological Exam      ROS:    Review of Systems   Constitutional: Negative  Negative for appetite change and fever  HENT: Negative  Negative for hearing loss, tinnitus, trouble swallowing and voice change  Eyes: Negative  Negative for photophobia and pain  Respiratory: Negative  Negative for shortness of breath  Cardiovascular: Negative  Negative for palpitations  Gastrointestinal: Negative  Negative for nausea and vomiting  Endocrine: Negative  Negative for cold intolerance and heat intolerance  Genitourinary: Negative  Negative for dysuria, frequency and urgency  Musculoskeletal: Negative  Negative for myalgias and neck pain  Skin: Negative  Negative for rash  Neurological: Negative  Negative for dizziness, tremors, seizures, syncope, facial asymmetry, speech difficulty, weakness, light-headedness, numbness and headaches  Hematological: Negative  Does not bruise/bleed easily  Psychiatric/Behavioral: Negative  Negative for confusion, hallucinations and sleep disturbance
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
## 2020-01-02 NOTE — PATIENT INSTRUCTIONS
I recommend considering hearing aids to prevent dementia    No driving either until we determine further on MRI Brain how old this might be due to possible risk for seizure  Activity Plan:  As we discussed I would recommend holding off on any activities that would put you at risk for repeated head injury until we at least have the MRI Brain to follow up the hygroma  Additional Testing or Referrals:   - Referral to other specialist: Neurosurgery   - Neuroimaging:    [x] MRI Brain with and without contrast    Lifestyle Recommendations:  - Maintain good sleep hygiene  Going to bed and waking up at consistent times, avoiding excessive daytime naps, avoiding caffeinated beverages in the evening, avoid excessive stimulation in the evening and generally using bed primarily for sleeping  One hour before bedtime would recommend turning lights down lower, decreasing your activity (may read quietly, listen to music at a low volume)  When you get into bed, should eliminate all technology (no texting, emailing, playing with your phone, iPad or tablet in bed)  - Maintain good hydration  Drink  2L of fluid a day (4 typical small water bottles)  - Maintain good nutrition  In particular don't skip meals and eat balanced meals regularly  Education and Follow-up  - Please contact us if any questions or concerns arise  Of course, try to protect yourself from head injuries, and if any new concerning symptoms or significant blow to the head or body go to the emergency department    Follow up with Neurosurgery

## 2020-01-08 ENCOUNTER — HOSPITAL ENCOUNTER (OUTPATIENT)
Dept: RADIOLOGY | Facility: IMAGING CENTER | Age: 80
Discharge: HOME | End: 2020-01-08
Payer: MEDICARE

## 2020-01-08 ENCOUNTER — HOSPITAL ENCOUNTER (OUTPATIENT)
Dept: RADIOLOGY | Facility: IMAGING CENTER | Age: 80
Discharge: HOME | End: 2020-01-08
Attending: PSYCHIATRY & NEUROLOGY
Payer: MEDICARE

## 2020-01-08 DIAGNOSIS — G96.08 SUBDURAL HYGROMA: ICD-10-CM

## 2020-01-08 DIAGNOSIS — S09.90XD TRAUMATIC INJURY OF HEAD, SUBSEQUENT ENCOUNTER: ICD-10-CM

## 2020-01-08 DIAGNOSIS — S46.012A TRAUMATIC TEAR OF LEFT ROTATOR CUFF, UNSPECIFIED TEAR EXTENT, INITIAL ENCOUNTER: ICD-10-CM

## 2020-01-08 DIAGNOSIS — R29.2 ASYMMETRICAL DEEP TENDON REFLEXES: ICD-10-CM

## 2020-01-08 PROCEDURE — 73221 MRI JOINT UPR EXTREM W/O DYE: CPT

## 2020-01-08 PROCEDURE — A9585 GADOBUTROL INJECTION: HCPCS | Performed by: PHYSICIAN ASSISTANT

## 2020-01-08 PROCEDURE — 70553 MRI BRAIN STEM W/O & W/DYE: CPT

## 2020-01-08 RX ADMIN — GADOBUTROL 7 ML: 604.72 INJECTION INTRAVENOUS at 12:15

## 2020-01-10 ENCOUNTER — OFFICE VISIT (OUTPATIENT)
Dept: OBGYN CLINIC | Facility: CLINIC | Age: 80
End: 2020-01-10
Payer: COMMERCIAL

## 2020-01-10 VITALS
SYSTOLIC BLOOD PRESSURE: 162 MMHG | BODY MASS INDEX: 23.19 KG/M2 | HEIGHT: 68 IN | WEIGHT: 153 LBS | DIASTOLIC BLOOD PRESSURE: 96 MMHG

## 2020-01-10 DIAGNOSIS — S46.012A TRAUMATIC COMPLETE TEAR OF LEFT ROTATOR CUFF, INITIAL ENCOUNTER: Primary | ICD-10-CM

## 2020-01-10 PROCEDURE — 99213 OFFICE O/P EST LOW 20 MIN: CPT | Performed by: ORTHOPAEDIC SURGERY
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
## 2020-01-14 ENCOUNTER — EVALUATION (OUTPATIENT)
Dept: PHYSICAL THERAPY | Facility: CLINIC | Age: 80
End: 2020-01-14
Payer: MEDICARE

## 2020-01-14 DIAGNOSIS — S46.012D TRAUMATIC COMPLETE TEAR OF LEFT ROTATOR CUFF, SUBSEQUENT ENCOUNTER: ICD-10-CM

## 2020-01-14 PROCEDURE — 97140 MANUAL THERAPY 1/> REGIONS: CPT | Performed by: PHYSICAL THERAPIST

## 2020-01-14 PROCEDURE — 97110 THERAPEUTIC EXERCISES: CPT | Performed by: PHYSICAL THERAPIST

## 2020-01-14 PROCEDURE — 97161 PT EVAL LOW COMPLEX 20 MIN: CPT | Performed by: PHYSICAL THERAPIST
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
## 2020-01-16 ENCOUNTER — CONSULT (OUTPATIENT)
Dept: NEUROSURGERY | Facility: CLINIC | Age: 80
End: 2020-01-16
Payer: MEDICARE

## 2020-01-16 VITALS
HEART RATE: 67 BPM | WEIGHT: 152 LBS | SYSTOLIC BLOOD PRESSURE: 118 MMHG | HEIGHT: 68 IN | RESPIRATION RATE: 16 BRPM | TEMPERATURE: 98.6 F | BODY MASS INDEX: 23.04 KG/M2 | DIASTOLIC BLOOD PRESSURE: 74 MMHG

## 2020-01-16 DIAGNOSIS — G96.08 SUBDURAL HYGROMA: Primary | ICD-10-CM

## 2020-01-16 PROCEDURE — 99203 OFFICE O/P NEW LOW 30 MIN: CPT | Performed by: PHYSICIAN ASSISTANT
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
## 2020-01-16 NOTE — PATIENT INSTRUCTIONS
Continue with all usual activities without restriction  Further follow-up with Neurosurgery will be on an as-needed basis  Should you have any significant neurologic changes such as new persistent headaches, decreased level consciousness and other neurologic changes such as gait or balance issues return to Neurosurgery for reassessment

## 2020-07-01 ENCOUNTER — TELEPHONE (OUTPATIENT)
Dept: OBGYN CLINIC | Facility: HOSPITAL | Age: 80
End: 2020-07-01

## 2020-07-01 NOTE — TELEPHONE ENCOUNTER
Patient is calling    [phone]    Patient is scheduled for his rt shoulder pain  Patient is stating that he had sx with Dr Anshul Khan about 10-12 years ago but is now asking for Dr Nery Lopez  Patient is stating that back then he did PT which he did at home & still does  Patient wants to just discuss what more he can do to get some more flexibility in that shoulder  Please let the patient know if there is anything that he needs for his appt on 7/10/20 or if the appt is okay to have

## 2020-07-10 ENCOUNTER — OFFICE VISIT (OUTPATIENT)
Dept: OBGYN CLINIC | Facility: CLINIC | Age: 80
End: 2020-07-10
Payer: MEDICARE

## 2020-07-10 VITALS
HEART RATE: 75 BPM | SYSTOLIC BLOOD PRESSURE: 130 MMHG | WEIGHT: 150 LBS | TEMPERATURE: 97.5 F | HEIGHT: 68 IN | BODY MASS INDEX: 22.73 KG/M2 | DIASTOLIC BLOOD PRESSURE: 78 MMHG

## 2020-07-10 DIAGNOSIS — S46.012A TRAUMATIC COMPLETE TEAR OF LEFT ROTATOR CUFF, INITIAL ENCOUNTER: ICD-10-CM

## 2020-07-10 DIAGNOSIS — M19.011 ARTHRITIS OF RIGHT ACROMIOCLAVICULAR JOINT: Primary | ICD-10-CM

## 2020-07-10 PROCEDURE — 3078F DIAST BP <80 MM HG: CPT | Performed by: ORTHOPAEDIC SURGERY

## 2020-07-10 PROCEDURE — 1160F RVW MEDS BY RX/DR IN RCRD: CPT | Performed by: ORTHOPAEDIC SURGERY

## 2020-07-10 PROCEDURE — 4040F PNEUMOC VAC/ADMIN/RCVD: CPT | Performed by: ORTHOPAEDIC SURGERY

## 2020-07-10 PROCEDURE — 99214 OFFICE O/P EST MOD 30 MIN: CPT | Performed by: ORTHOPAEDIC SURGERY

## 2020-07-10 PROCEDURE — 3008F BODY MASS INDEX DOCD: CPT | Performed by: ORTHOPAEDIC SURGERY

## 2020-07-10 PROCEDURE — 1036F TOBACCO NON-USER: CPT | Performed by: ORTHOPAEDIC SURGERY

## 2020-07-10 PROCEDURE — 3075F SYST BP GE 130 - 139MM HG: CPT | Performed by: ORTHOPAEDIC SURGERY

## 2020-07-10 NOTE — PROGRESS NOTES
Ortho Sports Medicine Shoulder New Patient Visit     Assesment:   70 y o  male right shoulder moderate AC joint arthritis with left shoulder known rotator cuff tear     Plan:    Conservative treatment:    Ice to shoulder 1-2 times daily, for 20 minutes at a time  PT for ROM and strengthening to shoulder, rotator cuff, scapular stabilizers  Diclofenac gel 1% ordered    Imaging: All imaging from today was reviewed by myself and explained to the patient  Injection:    May consider future corticosteroid injection depending on clinical exam/imaging  Surgery:     No surgery is recommended at this point, continue with conservative treatment plan as noted  Follow up:    Return if symptoms worsen or fail to improve  Chief Complaint   Patient presents with    Right Shoulder - Pain     History of Present Illness: The patient is a 70 y o , right hand dominant male whose occupation is unknown, referred to me by their primary care physician, seen in clinic for consultation of right shoulder pain  The patient denies a history of diabetes  The patient denies a history of thyroid disorder  Pain is located anterior, lateral   The patient rates the pain as a 8/10  The pain has been present for a few weeks  The patient does not recall sustaining any injury to the right shoulder recently  The mechanism of injury was unknown  The pain is characterized as dull, achy  The pain is present at all times  Pain is improved by rest, ice and NSAIDS  Pain is aggravated by overhead activity and lifting   Symptoms include clicking, catching and popping  The patient denies weakness  The patient denies numbness and tingling  The patient has tried rest, ice, NSAIDS and physical therapy            Shoulder Surgical History:  Left shoulder RCR - unknown year - re-tear evidence on MRI   Right shoulder RCR -unknown year    Past Medical, Social and Family History:  Past Medical History:   Diagnosis Date    Chronic sinusitis     Last Assessed:3/26/14    Degeneration of cervical intervertebral disc     Last Assessed:3/26/14    Derangement of unspecified medial meniscus due to old tear or injury, left knee     Last Assessed:4/10/14    Erythema migrans (Lyme disease)     Last Assessed:5/22/13    Eustachian tube dysfunction     Last Assessed:9/29/15    Hyperlipidemia     Hypertension     Osteoarthritis of wrist     Last Assessed:5/13/15    Primary osteoarthritis of left knee     Last Assessed:7/16/14    Spondylosis of cervical region without myelopathy or radiculopathy     Last Assessed:3/31/14    TMJ syndrome     Last Assessed:12/30/14    Trigger finger of left hand     Last Assessed:5/13/15    Trigger finger of right hand     Little finger, middle finger     Past Surgical History:   Procedure Laterality Date    COLONOSCOPY      KNEE SURGERY      NEUROPLASTY / TRANSPOSITION MEDIAN NERVE AT CARPAL TUNNEL      IA INCISE FINGER TENDON SHEATH Left 4/25/2019    Procedure: RELEASE TRIGGER FINGER - LEFT SMALL;  Surgeon: Arthur Grace MD;  [facility];  Service: Orthopedics    IA REVISE MEDIAN N/CARPAL TUNNEL SURG Left 4/25/2019    Procedure: RELEASE CARPAL TUNNEL OPEN;  Surgeon: Arthur Grace MD;  [facility];  Service: Orthopedics    SHOULDER ARTHROSCOPY Bilateral     SHOULDER SURGERY      TONSILLECTOMY       Allergies   Allergen Reactions    Azithromycin Other (See Comments)     Cannot recall allergy or reaction    Meperidine Other (See Comments)     Cannot recall allergy or reaction    Pseudoephedrine Other (See Comments)     Cannot recall allergy or reaction     Current Outpatient Medications on File Prior to Visit   Medication Sig Dispense Refill    aspirin 81 MG tablet Take 1 tablet by mouth every other day        ezetimibe-simvastatin (VYTORIN) 10-20 mg per tablet Take 1 tablet by mouth daily 90 tablet 3    lisinopril (ZESTRIL) 10 mg tablet Take 1 tablet (10 mg total) by mouth daily 90 tablet 3     Current Facility-Administered Medications on File Prior to Visit   Medication Dose Route Frequency Provider Last Rate Last Dose    methylPREDNISolone acetate (DEPO-MEDROL) injection 40 mg  40 mg Intramuscular Once Genevieve Ratliff DO         Social History     Socioeconomic History    Marital status: /Civil Union     Spouse name: Not on file    Number of children: Not on file    Years of education: Not on file    Highest education level: Not on file   Occupational History    Occupation: Retired   Social Needs    Financial resource strain: Not hard at all   [employer] insecurity:     Worry: Never true     Inability: Never true   [employer] needs:     Medical: No     Non-medical: No   Tobacco Use    Smoking status: Former Smoker    Smokeless tobacco: Never Used   Substance and Sexual Activity    Alcohol use: Yes     Frequency: 4 or more times a week     Drinks per session: Patient refused     Binge frequency: Patient refused     Comment: Social drinker/Denies alcohol causing problems    Drug use: No    Sexual activity: Yes     Partners: Female   Lifestyle    Physical activity:     Days per week: Patient refused     Minutes per session: Patient refused    Stress: Not at all   Relationships    Social connections:     Talks on phone: Three times a week     Gets together: Once a week     Attends [employer] service: More than 4 times per year     Active member of club or organization: Yes     Attends meetings of clubs or organizations: Never     Relationship status:     Intimate partner violence:     Fear of current or ex partner: No     Emotionally abused: No     Physically abused: No     Forced sexual activity: No   Other Topics Concern    Not on file   Social History Narrative    Exercises regularly     I have reviewed the past medical, surgical, social and family history, medications and allergies as documented in the EMR      Review of systems: PAERL reynoso negative other than that noted in the HPI  Constitutional: Negative for fatigue and fever  HENT: Negative for sore throat  Respiratory: Negative for shortness of breath  Cardiovascular: Negative for chest pain  Gastrointestinal: Negative for abdominal pain  Endocrine: Negative for cold intolerance and heat intolerance  Genitourinary: Negative for flank pain  Musculoskeletal: Negative for back pain  Skin: Negative for rash  Allergic/Immunologic: Negative for immunocompromised state  Neurological: Negative for dizziness  Psychiatric/Behavioral: Negative for agitation  Physical Exam:    Blood pressure 130/78, pulse 75, temperature 97 5 °F (36 4 °C), height 5' 8" (1 727 m), weight 68 kg (150 lb)      General/Constitutional: NAD, well developed, well nourished  HENT: Normocephalic, atraumatic  CV: Intact distal pulses, regular rate  Resp: No respiratory distress or labored breathing  Lymphatic: No lymphadenopathy palpated  Neuro: Alert and Oriented x 3, no focal deficits  Psych: Normal mood, normal affect, normal judgement, normal behavior  Skin: Warm, dry, no rashes, no erythema    Shoulder focused exam:    RIGHT LEFT    Scapula Atrophy Negative Negative     Winging Negative Negative     Protraction Negative Negative    Rotator cuff SS 5/5 5/5     IS 5/5 5/5     SubS 5/5 5/5    ROM     170     ER0 60 60     ER90 90    90     IR90 T6    T6     IRb T6    T6    TTP: AC Positive Negative     Biceps Negative Negative     Coracoid Negative Negative    Special Tests: O'Briens Negative Negative     Curtis-shear Negative Negative     Cross body Adduction Negative Negative     Speeds  Negative Negative     Elisa's Negative Negative     Whipple Negative Negative       Neer Negative Negative     Flynn Negative Negative    Instability: Apprehension & relocation not tested not tested     Load & shift not tested not tested    Other: Crank Negative Negative              UE NV Exam: +2 Radial pulses bilaterally  Sensation intact to light touch C5-T1 bilaterally, Radial/median/ulnar nerve motor intact    Bilateral elbow, wrist, and and forearm ROM full, painless with passive ROM, no ttp or crepitance throughout extremities below shoulder joint    Cervical ROM is full without pain, numbness or tingling    Shoulder Imaging    X-rays of the right shoulder were reviewed, which demonstrate moderate AC joint osteoarthritis  I have reviewed the radiology report and agree with their impression      Scribe Attestation    I,:   Luna Cadena am acting as a scribe while in the presence of the attending physician :        I,:   Blanka Hernandez, DO personally performed the services described in this documentation    as scribed in my presence :

## 2020-08-25 DIAGNOSIS — I10 HYPERTENSION, UNSPECIFIED TYPE: ICD-10-CM

## 2020-08-25 DIAGNOSIS — I10 ESSENTIAL HYPERTENSION: ICD-10-CM

## 2020-08-25 RX ORDER — EZETIMIBE AND SIMVASTATIN 10; 20 MG/1; MG/1
1 TABLET ORAL DAILY
Qty: 90 TABLET | Refills: 1 | Status: SHIPPED | OUTPATIENT
Start: 2020-08-25 | End: 2021-01-15 | Stop reason: SDUPTHER

## 2020-08-25 RX ORDER — LISINOPRIL 10 MG/1
10 TABLET ORAL DAILY
Qty: 90 TABLET | Refills: 1 | Status: SHIPPED | OUTPATIENT
Start: 2020-08-25 | End: 2021-01-15 | Stop reason: SDUPTHER

## 2020-09-29 ENCOUNTER — IMMUNIZATIONS (OUTPATIENT)
Dept: FAMILY MEDICINE CLINIC | Facility: CLINIC | Age: 80
End: 2020-09-29
Payer: MEDICARE

## 2020-09-29 DIAGNOSIS — Z23 NEED FOR VACCINATION: Primary | ICD-10-CM

## 2020-09-29 PROCEDURE — G0008 ADMIN INFLUENZA VIRUS VAC: HCPCS

## 2020-09-29 PROCEDURE — 90662 IIV NO PRSV INCREASED AG IM: CPT

## 2020-10-20 ENCOUNTER — OFFICE VISIT (OUTPATIENT)
Dept: FAMILY MEDICINE CLINIC | Facility: CLINIC | Age: 80
End: 2020-10-20
Payer: MEDICARE

## 2020-10-20 VITALS
HEART RATE: 72 BPM | TEMPERATURE: 96.3 F | BODY MASS INDEX: 22.34 KG/M2 | RESPIRATION RATE: 17 BRPM | OXYGEN SATURATION: 94 % | SYSTOLIC BLOOD PRESSURE: 124 MMHG | WEIGHT: 147.4 LBS | DIASTOLIC BLOOD PRESSURE: 80 MMHG | HEIGHT: 68 IN

## 2020-10-20 DIAGNOSIS — M26.622 ARTHRALGIA OF LEFT TEMPOROMANDIBULAR JOINT: Primary | ICD-10-CM

## 2020-10-20 PROCEDURE — 99213 OFFICE O/P EST LOW 20 MIN: CPT | Performed by: FAMILY MEDICINE

## 2020-10-20 RX ORDER — METHYLPREDNISOLONE 4 MG/1
TABLET ORAL
Qty: 1 EACH | Refills: 0 | Status: SHIPPED | OUTPATIENT
Start: 2020-10-20 | End: 2020-11-18 | Stop reason: ALTCHOICE

## 2020-11-18 ENCOUNTER — OFFICE VISIT (OUTPATIENT)
Dept: FAMILY MEDICINE CLINIC | Facility: CLINIC | Age: 80
End: 2020-11-18
Payer: MEDICARE

## 2020-11-18 VITALS
SYSTOLIC BLOOD PRESSURE: 170 MMHG | HEIGHT: 68 IN | HEART RATE: 72 BPM | BODY MASS INDEX: 22.58 KG/M2 | WEIGHT: 149 LBS | DIASTOLIC BLOOD PRESSURE: 90 MMHG | TEMPERATURE: 96.9 F

## 2020-11-18 DIAGNOSIS — Z00.00 MEDICARE ANNUAL WELLNESS VISIT, SUBSEQUENT: Primary | ICD-10-CM

## 2020-11-18 DIAGNOSIS — E78.5 HYPERLIPIDEMIA, UNSPECIFIED HYPERLIPIDEMIA TYPE: ICD-10-CM

## 2020-11-18 DIAGNOSIS — Z13.6 SCREENING FOR CARDIOVASCULAR CONDITION: ICD-10-CM

## 2020-11-18 DIAGNOSIS — Z12.5 SCREENING FOR PROSTATE CANCER: ICD-10-CM

## 2020-11-18 DIAGNOSIS — Z79.899 HIGH RISK MEDICATION USE: ICD-10-CM

## 2020-11-18 PROCEDURE — G0439 PPPS, SUBSEQ VISIT: HCPCS | Performed by: FAMILY MEDICINE

## 2020-12-01 ENCOUNTER — LAB (OUTPATIENT)
Dept: LAB | Facility: CLINIC | Age: 80
End: 2020-12-01
Payer: MEDICARE

## 2020-12-01 DIAGNOSIS — Z13.6 SCREENING FOR CARDIOVASCULAR CONDITION: ICD-10-CM

## 2020-12-01 DIAGNOSIS — Z12.5 SCREENING FOR PROSTATE CANCER: ICD-10-CM

## 2020-12-01 DIAGNOSIS — Z79.899 HIGH RISK MEDICATION USE: ICD-10-CM

## 2020-12-01 LAB
ALBUMIN SERPL BCP-MCNC: 3.9 G/DL (ref 3.5–5)
ALP SERPL-CCNC: 54 U/L (ref 46–116)
ALT SERPL W P-5'-P-CCNC: 37 U/L (ref 12–78)
ANION GAP SERPL CALCULATED.3IONS-SCNC: 4 MMOL/L (ref 4–13)
AST SERPL W P-5'-P-CCNC: 30 U/L (ref 5–45)
BASOPHILS # BLD AUTO: 0.04 THOUSANDS/ΜL (ref 0–0.1)
BASOPHILS NFR BLD AUTO: 1 % (ref 0–1)
BILIRUB SERPL-MCNC: 0.67 MG/DL (ref 0.2–1)
BILIRUB UR QL STRIP: NEGATIVE
BUN SERPL-MCNC: 17 MG/DL (ref 5–25)
CALCIUM SERPL-MCNC: 9.3 MG/DL (ref 8.3–10.1)
CHLORIDE SERPL-SCNC: 105 MMOL/L (ref 100–108)
CHOLEST SERPL-MCNC: 144 MG/DL (ref 50–200)
CLARITY UR: CLEAR
CO2 SERPL-SCNC: 29 MMOL/L (ref 21–32)
COLOR UR: YELLOW
CREAT SERPL-MCNC: 0.8 MG/DL (ref 0.6–1.3)
EOSINOPHIL # BLD AUTO: 0.16 THOUSAND/ΜL (ref 0–0.61)
EOSINOPHIL NFR BLD AUTO: 2 % (ref 0–6)
ERYTHROCYTE [DISTWIDTH] IN BLOOD BY AUTOMATED COUNT: 13.2 % (ref 11.6–15.1)
GFR SERPL CREATININE-BSD FRML MDRD: 86 ML/MIN/1.73SQ M
GLUCOSE P FAST SERPL-MCNC: 95 MG/DL (ref 65–99)
GLUCOSE UR STRIP-MCNC: NEGATIVE MG/DL
HCT VFR BLD AUTO: 46.9 % (ref 36.5–49.3)
HDLC SERPL-MCNC: 64 MG/DL
HGB BLD-MCNC: 14.9 G/DL (ref 12–17)
HGB UR QL STRIP.AUTO: NEGATIVE
IMM GRANULOCYTES # BLD AUTO: 0.03 THOUSAND/UL (ref 0–0.2)
IMM GRANULOCYTES NFR BLD AUTO: 0 % (ref 0–2)
KETONES UR STRIP-MCNC: NEGATIVE MG/DL
LDLC SERPL CALC-MCNC: 70 MG/DL (ref 0–100)
LEUKOCYTE ESTERASE UR QL STRIP: NEGATIVE
LYMPHOCYTES # BLD AUTO: 1.97 THOUSANDS/ΜL (ref 0.6–4.47)
LYMPHOCYTES NFR BLD AUTO: 27 % (ref 14–44)
MCH RBC QN AUTO: 32.7 PG (ref 26.8–34.3)
MCHC RBC AUTO-ENTMCNC: 31.8 G/DL (ref 31.4–37.4)
MCV RBC AUTO: 103 FL (ref 82–98)
MONOCYTES # BLD AUTO: 0.98 THOUSAND/ΜL (ref 0.17–1.22)
MONOCYTES NFR BLD AUTO: 13 % (ref 4–12)
NEUTROPHILS # BLD AUTO: 4.25 THOUSANDS/ΜL (ref 1.85–7.62)
NEUTS SEG NFR BLD AUTO: 57 % (ref 43–75)
NITRITE UR QL STRIP: NEGATIVE
NONHDLC SERPL-MCNC: 80 MG/DL
NRBC BLD AUTO-RTO: 0 /100 WBCS
PH UR STRIP.AUTO: 6.5 [PH]
PLATELET # BLD AUTO: 190 THOUSANDS/UL (ref 149–390)
PMV BLD AUTO: 11.2 FL (ref 8.9–12.7)
POTASSIUM SERPL-SCNC: 4.6 MMOL/L (ref 3.5–5.3)
PROT SERPL-MCNC: 7 G/DL (ref 6.4–8.2)
PROT UR STRIP-MCNC: NEGATIVE MG/DL
PSA SERPL-MCNC: 1.2 NG/ML (ref 0–4)
RBC # BLD AUTO: 4.56 MILLION/UL (ref 3.88–5.62)
SODIUM SERPL-SCNC: 138 MMOL/L (ref 136–145)
SP GR UR STRIP.AUTO: 1 (ref 1–1.03)
TRIGL SERPL-MCNC: 48 MG/DL
UROBILINOGEN UR QL STRIP.AUTO: 0.2 E.U./DL
WBC # BLD AUTO: 7.43 THOUSAND/UL (ref 4.31–10.16)

## 2020-12-01 PROCEDURE — G0103 PSA SCREENING: HCPCS

## 2020-12-01 PROCEDURE — 80061 LIPID PANEL: CPT

## 2020-12-01 PROCEDURE — 36415 COLL VENOUS BLD VENIPUNCTURE: CPT

## 2020-12-01 PROCEDURE — 80053 COMPREHEN METABOLIC PANEL: CPT

## 2020-12-01 PROCEDURE — 85025 COMPLETE CBC W/AUTO DIFF WBC: CPT

## 2020-12-01 PROCEDURE — 81003 URINALYSIS AUTO W/O SCOPE: CPT | Performed by: FAMILY MEDICINE

## 2021-01-15 DIAGNOSIS — I10 HYPERTENSION, UNSPECIFIED TYPE: ICD-10-CM

## 2021-01-15 DIAGNOSIS — I10 ESSENTIAL HYPERTENSION: ICD-10-CM

## 2021-01-15 RX ORDER — LISINOPRIL 10 MG/1
10 TABLET ORAL DAILY
Qty: 90 TABLET | Refills: 1 | Status: SHIPPED | OUTPATIENT
Start: 2021-01-15 | End: 2021-07-15 | Stop reason: SDUPTHER

## 2021-01-15 RX ORDER — EZETIMIBE AND SIMVASTATIN 10; 20 MG/1; MG/1
1 TABLET ORAL DAILY
Qty: 90 TABLET | Refills: 1 | Status: SHIPPED | OUTPATIENT
Start: 2021-01-15 | End: 2021-07-15 | Stop reason: SDUPTHER

## 2021-01-19 ENCOUNTER — IMMUNIZATIONS (OUTPATIENT)
Dept: FAMILY MEDICINE CLINIC | Facility: HOSPITAL | Age: 81
End: 2021-01-19

## 2021-01-19 DIAGNOSIS — Z23 ENCOUNTER FOR IMMUNIZATION: Primary | ICD-10-CM

## 2021-01-19 PROCEDURE — 0001A SARS-COV-2 / COVID-19 MRNA VACCINE (PFIZER-BIONTECH) 30 MCG: CPT | Performed by: NURSE PRACTITIONER

## 2021-01-19 PROCEDURE — 91300 SARS-COV-2 / COVID-19 MRNA VACCINE (PFIZER-BIONTECH) 30 MCG: CPT | Performed by: NURSE PRACTITIONER

## 2021-02-07 ENCOUNTER — IMMUNIZATIONS (OUTPATIENT)
Dept: FAMILY MEDICINE CLINIC | Facility: HOSPITAL | Age: 81
End: 2021-02-07

## 2021-02-07 DIAGNOSIS — Z23 ENCOUNTER FOR IMMUNIZATION: Primary | ICD-10-CM

## 2021-02-07 PROCEDURE — 91300 SARS-COV-2 / COVID-19 MRNA VACCINE (PFIZER-BIONTECH) 30 MCG: CPT

## 2021-02-07 PROCEDURE — 0002A SARS-COV-2 / COVID-19 MRNA VACCINE (PFIZER-BIONTECH) 30 MCG: CPT

## 2021-07-15 DIAGNOSIS — I10 ESSENTIAL HYPERTENSION: ICD-10-CM

## 2021-07-15 DIAGNOSIS — I10 HYPERTENSION, UNSPECIFIED TYPE: ICD-10-CM

## 2021-07-15 RX ORDER — LISINOPRIL 10 MG/1
10 TABLET ORAL DAILY
Qty: 90 TABLET | Refills: 1 | Status: SHIPPED | OUTPATIENT
Start: 2021-07-15 | End: 2021-11-30 | Stop reason: SDUPTHER

## 2021-07-15 RX ORDER — EZETIMIBE AND SIMVASTATIN 10; 20 MG/1; MG/1
1 TABLET ORAL DAILY
Qty: 90 TABLET | Refills: 1 | Status: SHIPPED | OUTPATIENT
Start: 2021-07-15 | End: 2021-11-30 | Stop reason: SDUPTHER

## 2021-08-16 ENCOUNTER — OFFICE VISIT (OUTPATIENT)
Dept: FAMILY MEDICINE CLINIC | Facility: CLINIC | Age: 81
End: 2021-08-16
Payer: MEDICARE

## 2021-08-16 VITALS
HEIGHT: 68 IN | TEMPERATURE: 96.2 F | BODY MASS INDEX: 22.88 KG/M2 | RESPIRATION RATE: 16 BRPM | SYSTOLIC BLOOD PRESSURE: 166 MMHG | WEIGHT: 151 LBS | DIASTOLIC BLOOD PRESSURE: 84 MMHG | OXYGEN SATURATION: 96 % | HEART RATE: 74 BPM

## 2021-08-16 DIAGNOSIS — M26.622 ARTHRALGIA OF LEFT TEMPOROMANDIBULAR JOINT: Primary | ICD-10-CM

## 2021-08-16 PROBLEM — M26.629 TMJ ARTHRALGIA: Status: ACTIVE | Noted: 2021-08-16

## 2021-08-16 PROCEDURE — 99214 OFFICE O/P EST MOD 30 MIN: CPT | Performed by: FAMILY MEDICINE

## 2021-08-16 RX ORDER — METHYLPREDNISOLONE 4 MG/1
TABLET ORAL
Qty: 21 TABLET | Refills: 0 | Status: SHIPPED | OUTPATIENT
Start: 2021-08-16 | End: 2021-11-30 | Stop reason: ALTCHOICE

## 2021-08-16 NOTE — PROGRESS NOTES
Cheli Bishop 1940 male MRN: [id number]    Family Medicine Acute Visit    ASSESSMENT/PLAN  Problem List Items Addressed This Visit        Musculoskeletal and Integument    TMJ arthralgia - Primary    Relevant Medications    methylPREDNISolone 4 MG tablet therapy pack                Future Appointments   Date Time Provider Dennis Allen   11/30/2021  8:00 AM DO [facility]          SUBJECTIVE  CC: Jaw Pain (had TMJ problem about 2 years ago)      HPI:  Cheli Bishop is a 80 y o  male who presents for acute visit  He reports chronic issue with this for a few years  He reports treatment with prednisone usually helps it  His left jaw hurts to eat  No fever  No other symptoms  He does follow with dentist      Review of Systems   Constitutional: Negative for chills and fever  HENT: Negative for congestion, postnasal drip, rhinorrhea and sinus pain  Eyes: Negative for photophobia and visual disturbance  Respiratory: Negative for cough and shortness of breath  Cardiovascular: Negative for chest pain, palpitations and leg swelling  Gastrointestinal: Negative for abdominal pain, constipation, diarrhea, nausea and vomiting  Genitourinary: Negative for difficulty urinating and dysuria  Musculoskeletal: Negative for arthralgias and myalgias  Skin: Negative for rash  Neurological: Negative for dizziness and syncope         Historical Information   The patient history was reviewed as follows:  Past Medical History:   Diagnosis Date    Chronic sinusitis     Last Assessed:3/26/14    Degeneration of cervical intervertebral disc     Last Assessed:3/26/14    Derangement of unspecified medial meniscus due to old tear or injury, left knee     Last Assessed:4/10/14    Erythema migrans (Lyme disease)     Last Assessed:5/22/13    Eustachian tube dysfunction     Last Assessed:9/29/15    Hyperlipidemia     Hypertension     Osteoarthritis of wrist     Last Assessed:5/13/15    Primary osteoarthritis of left knee     Last Assessed:7/16/14    Spondylosis of cervical region without myelopathy or radiculopathy     Last Assessed:3/31/14    TMJ syndrome     Last Assessed:12/30/14    Trigger finger of left hand     Last Assessed:5/13/15    Trigger finger of right hand     Little finger, middle finger         Past Surgical History:   Procedure Laterality Date    COLONOSCOPY      KNEE SURGERY      NEUROPLASTY / TRANSPOSITION MEDIAN NERVE AT CARPAL TUNNEL      GA INCISE FINGER TENDON SHEATH Left 4/25/2019    Procedure: RELEASE TRIGGER FINGER - LEFT SMALL;  Surgeon: Santy Castillo MD;  [facility];  Service: Orthopedics    GA REVISE MEDIAN N/CARPAL TUNNEL SURG Left 4/25/2019    Procedure: RELEASE CARPAL TUNNEL OPEN;  Surgeon: Santy Castillo MD;  [facility];  Service: Orthopedics    SHOULDER ARTHROSCOPY Bilateral     SHOULDER SURGERY      TONSILLECTOMY       Family History   Problem Relation Age of Onset    Heart disease Mother         Coronary heart disease    Dementia Mother     Heart disease Father         Coronary heart disease    Lung cancer Paternal Grandmother       Social History   Social History     Substance and Sexual Activity   Alcohol Use Yes    Comment: Social drinker/Denies alcohol causing problems     Social History     Substance and Sexual Activity   Drug Use No     Social History     Tobacco Use   Smoking Status Former Smoker   Smokeless Tobacco Never Used       Medications:     Current Outpatient Medications:     aspirin 81 MG tablet, Take 1 tablet by mouth daily , Disp: , Rfl:     ezetimibe-simvastatin (Vytorin) 10-20 mg per tablet, Take 1 tablet by mouth daily, Disp: 90 tablet, Rfl: 1    lisinopril (ZESTRIL) 10 mg tablet, Take 1 tablet (10 mg total) by mouth daily, Disp: 90 tablet, Rfl: 1    diclofenac sodium (VOLTAREN) 1 %, Apply 2 g topically 4 (four) times a day (Patient not taking: Reported on 11/18/2020), Disp: 1 Tube, Rfl: 3   methylPREDNISolone 4 MG tablet therapy pack, Use as directed on package, Disp: 21 tablet, Rfl: 0    Allergies   Allergen Reactions    Azithromycin Other (See Comments)     Cannot recall allergy or reaction    Meperidine Other (See Comments)     Cannot recall allergy or reaction    Pseudoephedrine Other (See Comments)     Cannot recall allergy or reaction       OBJECTIVE  Vitals:   Vitals:    08/16/21 1000   BP: 166/84   BP Location: Right arm   Patient Position: Sitting   Cuff Size: Standard   Pulse: 74   Resp: 16   Temp: (!) 96 2 °F (35 7 °C)   TempSrc: Tympanic   SpO2: 96%   Weight: 68 5 kg (151 lb)   Height: 5' 8" (1 727 m)         Physical Exam  Constitutional:       Appearance: He is well-developed  HENT:      Head: Normocephalic and atraumatic  Comments: +TTP left TMJ     Right Ear: External ear normal       Left Ear: External ear normal    Eyes:      Extraocular Movements: Extraocular movements intact  Conjunctiva/sclera: Conjunctivae normal    Cardiovascular:      Rate and Rhythm: Normal rate and regular rhythm  Heart sounds: Normal heart sounds  No murmur heard  Pulmonary:      Effort: Pulmonary effort is normal  No respiratory distress  Breath sounds: Normal breath sounds  No wheezing  Musculoskeletal:         General: Normal range of motion  Cervical back: Normal range of motion and neck supple  Skin:     General: Skin is warm and dry  Neurological:      Mental Status: He is alert and oriented to person, place, and time     Psychiatric:         Behavior: Behavior normal                     Jarrett Jacques, DO    8/16/2021

## 2021-11-15 ENCOUNTER — TELEPHONE (OUTPATIENT)
Dept: GASTROENTEROLOGY | Facility: CLINIC | Age: 81
End: 2021-11-15

## 2021-11-16 ENCOUNTER — TELEPHONE (OUTPATIENT)
Dept: GASTROENTEROLOGY | Facility: CLINIC | Age: 81
End: 2021-11-16

## 2021-11-30 ENCOUNTER — OFFICE VISIT (OUTPATIENT)
Dept: FAMILY MEDICINE CLINIC | Facility: CLINIC | Age: 81
End: 2021-11-30
Payer: MEDICARE

## 2021-11-30 VITALS
SYSTOLIC BLOOD PRESSURE: 130 MMHG | HEIGHT: 68 IN | OXYGEN SATURATION: 95 % | RESPIRATION RATE: 19 BRPM | TEMPERATURE: 96.5 F | HEART RATE: 79 BPM | BODY MASS INDEX: 22.4 KG/M2 | WEIGHT: 147.8 LBS | DIASTOLIC BLOOD PRESSURE: 84 MMHG

## 2021-11-30 DIAGNOSIS — I10 HYPERTENSION, UNSPECIFIED TYPE: ICD-10-CM

## 2021-11-30 DIAGNOSIS — E78.5 HYPERLIPIDEMIA, UNSPECIFIED HYPERLIPIDEMIA TYPE: ICD-10-CM

## 2021-11-30 DIAGNOSIS — Z00.00 MEDICARE ANNUAL WELLNESS VISIT, SUBSEQUENT: Primary | ICD-10-CM

## 2021-11-30 DIAGNOSIS — Z79.899 HIGH RISK MEDICATION USE: ICD-10-CM

## 2021-11-30 DIAGNOSIS — Z13.6 SCREENING FOR CARDIOVASCULAR CONDITION: ICD-10-CM

## 2021-11-30 DIAGNOSIS — Z12.5 SCREENING FOR PROSTATE CANCER: ICD-10-CM

## 2021-11-30 DIAGNOSIS — I10 ESSENTIAL HYPERTENSION: ICD-10-CM

## 2021-11-30 PROCEDURE — G0439 PPPS, SUBSEQ VISIT: HCPCS | Performed by: FAMILY MEDICINE

## 2021-11-30 PROCEDURE — 1123F ACP DISCUSS/DSCN MKR DOCD: CPT | Performed by: FAMILY MEDICINE

## 2021-11-30 RX ORDER — EZETIMIBE AND SIMVASTATIN 10; 20 MG/1; MG/1
1 TABLET ORAL DAILY
Qty: 90 TABLET | Refills: 1 | Status: SHIPPED | OUTPATIENT
Start: 2021-11-30

## 2021-11-30 RX ORDER — LISINOPRIL 10 MG/1
10 TABLET ORAL DAILY
Qty: 90 TABLET | Refills: 1 | Status: SHIPPED | OUTPATIENT
Start: 2021-11-30 | End: 2022-07-15

## 2021-12-02 ENCOUNTER — APPOINTMENT (OUTPATIENT)
Dept: LAB | Facility: CLINIC | Age: 81
End: 2021-12-02
Payer: MEDICARE

## 2021-12-02 DIAGNOSIS — E78.5 HYPERLIPIDEMIA, UNSPECIFIED HYPERLIPIDEMIA TYPE: ICD-10-CM

## 2021-12-02 DIAGNOSIS — Z12.5 SCREENING FOR PROSTATE CANCER: ICD-10-CM

## 2021-12-02 DIAGNOSIS — Z79.899 HIGH RISK MEDICATION USE: ICD-10-CM

## 2021-12-02 DIAGNOSIS — Z13.6 SCREENING FOR CARDIOVASCULAR CONDITION: ICD-10-CM

## 2021-12-02 LAB
ALBUMIN SERPL BCP-MCNC: 3.9 G/DL (ref 3.5–5)
ALP SERPL-CCNC: 51 U/L (ref 46–116)
ALT SERPL W P-5'-P-CCNC: 43 U/L (ref 12–78)
ANION GAP SERPL CALCULATED.3IONS-SCNC: 3 MMOL/L (ref 4–13)
AST SERPL W P-5'-P-CCNC: 30 U/L (ref 5–45)
BASOPHILS # BLD AUTO: 0.04 THOUSANDS/ΜL (ref 0–0.1)
BASOPHILS NFR BLD AUTO: 1 % (ref 0–1)
BILIRUB SERPL-MCNC: 0.9 MG/DL (ref 0.2–1)
BUN SERPL-MCNC: 14 MG/DL (ref 5–25)
CALCIUM SERPL-MCNC: 9.7 MG/DL (ref 8.3–10.1)
CHLORIDE SERPL-SCNC: 104 MMOL/L (ref 100–108)
CHOLEST SERPL-MCNC: 155 MG/DL
CO2 SERPL-SCNC: 29 MMOL/L (ref 21–32)
CREAT SERPL-MCNC: 0.84 MG/DL (ref 0.6–1.3)
EOSINOPHIL # BLD AUTO: 0.11 THOUSAND/ΜL (ref 0–0.61)
EOSINOPHIL NFR BLD AUTO: 2 % (ref 0–6)
ERYTHROCYTE [DISTWIDTH] IN BLOOD BY AUTOMATED COUNT: 12.9 % (ref 11.6–15.1)
GFR SERPL CREATININE-BSD FRML MDRD: 83 ML/MIN/1.73SQ M
GLUCOSE P FAST SERPL-MCNC: 100 MG/DL (ref 65–99)
HCT VFR BLD AUTO: 48.7 % (ref 36.5–49.3)
HDLC SERPL-MCNC: 72 MG/DL
HGB BLD-MCNC: 15.9 G/DL (ref 12–17)
IMM GRANULOCYTES # BLD AUTO: 0.04 THOUSAND/UL (ref 0–0.2)
IMM GRANULOCYTES NFR BLD AUTO: 1 % (ref 0–2)
LDLC SERPL CALC-MCNC: 71 MG/DL (ref 0–100)
LYMPHOCYTES # BLD AUTO: 1.74 THOUSANDS/ΜL (ref 0.6–4.47)
LYMPHOCYTES NFR BLD AUTO: 24 % (ref 14–44)
MCH RBC QN AUTO: 32.6 PG (ref 26.8–34.3)
MCHC RBC AUTO-ENTMCNC: 32.6 G/DL (ref 31.4–37.4)
MCV RBC AUTO: 100 FL (ref 82–98)
MONOCYTES # BLD AUTO: 0.71 THOUSAND/ΜL (ref 0.17–1.22)
MONOCYTES NFR BLD AUTO: 10 % (ref 4–12)
NEUTROPHILS # BLD AUTO: 4.78 THOUSANDS/ΜL (ref 1.85–7.62)
NEUTS SEG NFR BLD AUTO: 62 % (ref 43–75)
NONHDLC SERPL-MCNC: 83 MG/DL
NRBC BLD AUTO-RTO: 0 /100 WBCS
PLATELET # BLD AUTO: 214 THOUSANDS/UL (ref 149–390)
PMV BLD AUTO: 10.2 FL (ref 8.9–12.7)
POTASSIUM SERPL-SCNC: 5.1 MMOL/L (ref 3.5–5.3)
PROT SERPL-MCNC: 7.4 G/DL (ref 6.4–8.2)
PSA SERPL-MCNC: 1.2 NG/ML (ref 0–4)
RBC # BLD AUTO: 4.87 MILLION/UL (ref 3.88–5.62)
SODIUM SERPL-SCNC: 136 MMOL/L (ref 136–145)
TRIGL SERPL-MCNC: 59 MG/DL
WBC # BLD AUTO: 7.42 THOUSAND/UL (ref 4.31–10.16)

## 2021-12-02 PROCEDURE — G0103 PSA SCREENING: HCPCS

## 2021-12-02 PROCEDURE — 36415 COLL VENOUS BLD VENIPUNCTURE: CPT

## 2021-12-02 PROCEDURE — 80061 LIPID PANEL: CPT

## 2021-12-02 PROCEDURE — 80053 COMPREHEN METABOLIC PANEL: CPT

## 2021-12-02 PROCEDURE — 85025 COMPLETE CBC W/AUTO DIFF WBC: CPT

## 2021-12-14 ENCOUNTER — TELEPHONE (OUTPATIENT)
Dept: GASTROENTEROLOGY | Facility: CLINIC | Age: 81
End: 2021-12-14

## 2021-12-20 ENCOUNTER — ANESTHESIA EVENT (OUTPATIENT)
Dept: GASTROENTEROLOGY | Facility: AMBULATORY SURGERY CENTER | Age: 81
End: 2021-12-20

## 2021-12-21 ENCOUNTER — ANESTHESIA (OUTPATIENT)
Dept: GASTROENTEROLOGY | Facility: AMBULATORY SURGERY CENTER | Age: 81
End: 2021-12-21

## 2021-12-21 ENCOUNTER — HOSPITAL ENCOUNTER (OUTPATIENT)
Dept: GASTROENTEROLOGY | Facility: AMBULATORY SURGERY CENTER | Age: 81
Discharge: HOME | End: 2021-12-21
Payer: MEDICARE

## 2021-12-21 VITALS
OXYGEN SATURATION: 98 % | HEART RATE: 78 BPM | RESPIRATION RATE: 24 BRPM | SYSTOLIC BLOOD PRESSURE: 121 MMHG | TEMPERATURE: 97.4 F | DIASTOLIC BLOOD PRESSURE: 78 MMHG

## 2021-12-21 DIAGNOSIS — Z86.010 HISTORY OF COLON POLYPS: ICD-10-CM

## 2021-12-21 PROCEDURE — G0105 COLORECTAL SCRN; HI RISK IND: HCPCS | Performed by: INTERNAL MEDICINE

## 2021-12-21 RX ORDER — PROPOFOL 10 MG/ML
INJECTION, EMULSION INTRAVENOUS AS NEEDED
Status: DISCONTINUED | OUTPATIENT
Start: 2021-12-21 | End: 2021-12-21

## 2021-12-21 RX ORDER — LIDOCAINE HYDROCHLORIDE 10 MG/ML
INJECTION, SOLUTION EPIDURAL; INFILTRATION; INTRACAUDAL; PERINEURAL AS NEEDED
Status: DISCONTINUED | OUTPATIENT
Start: 2021-12-21 | End: 2021-12-21

## 2021-12-21 RX ORDER — LIDOCAINE HYDROCHLORIDE 10 MG/ML
0.5 INJECTION, SOLUTION EPIDURAL; INFILTRATION; INTRACAUDAL; PERINEURAL ONCE AS NEEDED
Status: DISCONTINUED | OUTPATIENT
Start: 2021-12-21 | End: 2021-12-25 | Stop reason: HOSPADM

## 2021-12-21 RX ORDER — SODIUM CHLORIDE, SODIUM LACTATE, POTASSIUM CHLORIDE, CALCIUM CHLORIDE 600; 310; 30; 20 MG/100ML; MG/100ML; MG/100ML; MG/100ML
125 INJECTION, SOLUTION INTRAVENOUS CONTINUOUS
Status: DISCONTINUED | OUTPATIENT
Start: 2021-12-21 | End: 2021-12-25 | Stop reason: HOSPADM

## 2021-12-21 RX ADMIN — PROPOFOL 20 MG: 10 INJECTION, EMULSION INTRAVENOUS at 07:58

## 2021-12-21 RX ADMIN — PROPOFOL 30 MG: 10 INJECTION, EMULSION INTRAVENOUS at 08:06

## 2021-12-21 RX ADMIN — PROPOFOL 20 MG: 10 INJECTION, EMULSION INTRAVENOUS at 08:01

## 2021-12-21 RX ADMIN — PROPOFOL 30 MG: 10 INJECTION, EMULSION INTRAVENOUS at 08:16

## 2021-12-21 RX ADMIN — PROPOFOL 90 MG: 10 INJECTION, EMULSION INTRAVENOUS at 07:55

## 2021-12-21 RX ADMIN — SODIUM CHLORIDE, SODIUM LACTATE, POTASSIUM CHLORIDE, CALCIUM CHLORIDE 125 ML/HR: 600; 310; 30; 20 INJECTION, SOLUTION INTRAVENOUS at 07:40

## 2021-12-21 RX ADMIN — LIDOCAINE HYDROCHLORIDE 100 MG: 10 INJECTION, SOLUTION EPIDURAL; INFILTRATION; INTRACAUDAL; PERINEURAL at 07:55

## 2021-12-21 RX ADMIN — PROPOFOL 30 MG: 10 INJECTION, EMULSION INTRAVENOUS at 08:14

## 2021-12-21 RX ADMIN — PROPOFOL 30 MG: 10 INJECTION, EMULSION INTRAVENOUS at 08:09

## 2021-12-21 RX ADMIN — PROPOFOL 20 MG: 10 INJECTION, EMULSION INTRAVENOUS at 08:03

## 2022-07-15 DIAGNOSIS — I10 ESSENTIAL HYPERTENSION: ICD-10-CM

## 2022-07-15 RX ORDER — LISINOPRIL 10 MG/1
TABLET ORAL
Qty: 90 TABLET | Refills: 1 | Status: SHIPPED | OUTPATIENT
Start: 2022-07-15 | End: 2022-10-10 | Stop reason: SDUPTHER

## 2022-09-16 NOTE — PROGRESS NOTES
Assessment:     1  Numbness and tingling in left hand    2  Trigger finger, left little finger        Plan:  The patient was seen and examined by Dr Jes Haro and myself  Problem List Items Addressed This Visit        Musculoskeletal and Integument    Trigger finger, left little finger     Findings consistent with left small trigger finger  Findings and treatment options were discussed with the patient  Patient did well with cortisone injections the past, but if he needs to have surgery for his left wrist he would like to have the trigger finger taken care of at the same time  He will follow up with Dr Kellen Guerra for further treatment recommendations  All questions were answered to patient's satisfaction  Relevant Orders    Ambulatory referral to Hand Surgery       Other    Numbness and tingling in left hand - Primary     Findings consistent with left hand numbness and tingling, possible reoccurrence of carpal tunnel syndrome  Findings and treatment options were discussed with the patient  Recommend EMG of the left upper extremity to confirm diagnosis  Will have him follow up with Dr Kellen Guerra after EMG for further treatment recommendations  All questions were answered to patient's satisfaction  Relevant Orders    XR wrist 3+ vw left    EMG 1 Limb    Ambulatory referral to Hand Surgery         Subjective:     Patient ID: Ellene Curling is a 70 y o  male  Chief Complaint: This is a 53-year-old white male complaining of progressively worsening numbness and tingling in his left hand for about the past 9 months  He states the symptoms are worse at nighttime and affect the thumb, index, long and ring fingers  He denies any numbness or tingling in his small finger  He has a history of carpal tunnel syndrome and had a carpal tunnel release surgery at least 15 years ago  He was doing well until recently  No recent treatment   He has a wrist brace at home, but did not try it since it did not work Final Abscess Aerobic Bacterial Culture (specimen - right buttock) report on 9/16/22  [facility] Emergency Dept discharge antibiotic prescribed: [1] Amoxicillin-Clavulanate (Augmentin) 875-125 mg PO tablet, 1 tablet by mouth 2 times daily for 10 days AND [2] Doxycycline 100 mg PO tablet, 1 tablet (100 mg) by mouth 2 times daily for 10 days  #1. Bacteria,Staphylococcus epidermidis Suscptibilities not routinely done  #2 Bacteria, Corynebacterium species, Susceptibilities not routinely done  Incision and Drainage performed in the [place] ED: yes  Recommendations in treatment per [facility] ED lab result culture protocol       several years ago when he had carpal tunnel syndrome  He also has occasional locking of the left small finger  He was seen by Dr Jes Haro in 2015 and had a cortisone injection that helped  Patient intake form was reviewed today         Allergy:  Allergies   Allergen Reactions    Azithromycin     Meperidine     Pseudoephedrine      Medications:  all current active meds have been reviewed  Past Medical History:  Past Medical History:   Diagnosis Date    Chronic sinusitis     Last Assessed:3/26/14    Degeneration of cervical intervertebral disc     Last Assessed:3/26/14    Derangement of unspecified medial meniscus due to old tear or injury, left knee     Last Assessed:4/10/14    Erythema migrans (Lyme disease)     Last Assessed:5/22/13    Eustachian tube dysfunction     Last Assessed:9/29/15    Hyperlipidemia     Hypertension     Osteoarthritis of wrist     Last Assessed:5/13/15    Primary osteoarthritis of left knee     Last Assessed:7/16/14    Spondylosis of cervical region without myelopathy or radiculopathy     Last Assessed:3/31/14    TMJ syndrome     Last Assessed:12/30/14    Trigger finger of left hand     Last Assessed:5/13/15    Trigger finger of right hand     Little finger, middle finger     Past Surgical History:  Past Surgical History:   Procedure Laterality Date    COLONOSCOPY      KNEE SURGERY      NEUROPLASTY / TRANSPOSITION MEDIAN NERVE AT CARPAL TUNNEL      SHOULDER ARTHROSCOPY Bilateral     SHOULDER SURGERY      TONSILLECTOMY       Family History:  Family History   Problem Relation Age of Onset    Heart disease Mother         Coronary heart disease    Dementia Mother     Heart disease Father         Coronary heart disease    Lung cancer Paternal Grandmother      Social History:  History   Alcohol Use    Yes     Comment: Social drinker/Denies alcohol causing problems     History   Drug Use No     History   Smoking Status    Former Smoker   Smokeless Tobacco    Never Used Review of Systems   Constitutional: Negative  HENT: Negative  Eyes: Negative  Respiratory: Negative  Cardiovascular: Negative  Gastrointestinal: Negative  Endocrine: Negative  Genitourinary: Negative  Musculoskeletal: Positive for arthralgias, joint swelling and myalgias  Skin: Negative  Allergic/Immunologic: Negative  Neurological: Negative  Hematological: Negative  Psychiatric/Behavioral: Negative  Objective:  BP Readings from Last 1 Encounters:   09/04/18 124/78      Wt Readings from Last 1 Encounters:   09/04/18 71 2 kg (157 lb)      BMI:   Estimated body mass index is 24 59 kg/m² as calculated from the following:    Height as of this encounter: 5' 7" (1 702 m)  Weight as of this encounter: 71 2 kg (157 lb)  BSA:   Estimated body surface area is 1 82 meters squared as calculated from the following:    Height as of this encounter: 5' 7" (1 702 m)  Weight as of this encounter: 71 2 kg (157 lb)  Physical Exam   Constitutional: He is oriented to person, place, and time  He appears well-developed  HENT:   Head: Normocephalic and atraumatic  Eyes: EOM are normal  Pupils are equal, round, and reactive to light  Neck: Neck supple  Neurological: He is alert and oriented to person, place, and time  Skin: Skin is warm  Psychiatric: He has a normal mood and affect  Nursing note and vitals reviewed  Right Hand Exam   Right hand exam is normal       Left Hand Exam     Tenderness   Left hand tenderness location: Tenderness over A1 pulley of small finger  Range of Motion   The patient has normal left wrist ROM  Muscle Strength   Wrist Extension: 5/5   Wrist Flexion: 5/5   :  4/5     Tests   Phalens Sign: negative  Tinels Sign (Medial Nerve): positive  Finkelstein: negative    Other   Erythema: absent  Scars: present  Left hand sensation: Decreased over radial aspect of ring finger    Pulse: present    Comments:  Active triggering of small finger on exam            I have personally reviewed pertinent films in PACS  X-rays left wrist reveal no abnormalities

## 2022-09-23 ENCOUNTER — TELEPHONE (OUTPATIENT)
Dept: FAMILY MEDICINE CLINIC | Facility: CLINIC | Age: 82
End: 2022-09-23

## 2022-09-23 DIAGNOSIS — M26.629 ARTHRALGIA OF TEMPOROMANDIBULAR JOINT, UNSPECIFIED LATERALITY: Primary | ICD-10-CM

## 2022-09-23 RX ORDER — PREDNISONE 10 MG/1
TABLET ORAL
Qty: 21 TABLET | Refills: 0 | Status: SHIPPED | OUTPATIENT
Start: 2022-09-23

## 2022-09-23 NOTE — TELEPHONE ENCOUNTER
Patient is having the same problem as every year, he is asking for Prednisone for his jaw pain, he had this last year on 8/16/21   Please advise patient can be reached at [phone] and uses [facility] in [facility]

## 2022-10-10 DIAGNOSIS — I10 HYPERTENSION, UNSPECIFIED TYPE: ICD-10-CM

## 2022-10-10 DIAGNOSIS — I10 ESSENTIAL HYPERTENSION: ICD-10-CM

## 2022-10-10 RX ORDER — LISINOPRIL 10 MG/1
10 TABLET ORAL DAILY
Qty: 90 TABLET | Refills: 1 | Status: SHIPPED | OUTPATIENT
Start: 2022-10-10

## 2022-10-10 RX ORDER — EZETIMIBE AND SIMVASTATIN 10; 20 MG/1; MG/1
1 TABLET ORAL DAILY
Qty: 90 TABLET | Refills: 1 | Status: SHIPPED | OUTPATIENT
Start: 2022-10-10

## 2022-11-14 ENCOUNTER — OFFICE VISIT (OUTPATIENT)
Dept: URGENT CARE | Facility: CLINIC | Age: 82
End: 2022-11-14

## 2022-11-14 VITALS
SYSTOLIC BLOOD PRESSURE: 158 MMHG | BODY MASS INDEX: 22.88 KG/M2 | WEIGHT: 151 LBS | DIASTOLIC BLOOD PRESSURE: 90 MMHG | HEART RATE: 64 BPM | RESPIRATION RATE: 16 BRPM | HEIGHT: 68 IN | OXYGEN SATURATION: 98 % | TEMPERATURE: 97.9 F

## 2022-11-14 DIAGNOSIS — R31.9 HEMATURIA, UNSPECIFIED TYPE: Primary | ICD-10-CM

## 2022-11-14 LAB
SL AMB  POCT GLUCOSE, UA: NEGATIVE
SL AMB LEUKOCYTE ESTERASE,UA: NEGATIVE
SL AMB POCT BILIRUBIN,UA: NEGATIVE
SL AMB POCT BLOOD,UA: NEGATIVE
SL AMB POCT CLARITY,UA: CLEAR
SL AMB POCT COLOR,UA: YELLOW
SL AMB POCT KETONES,UA: NEGATIVE
SL AMB POCT NITRITE,UA: NEGATIVE
SL AMB POCT PH,UA: 6
SL AMB POCT SPECIFIC GRAVITY,UA: 1.02
SL AMB POCT URINE PROTEIN: NEGATIVE
SL AMB POCT UROBILINOGEN: 0.2

## 2022-11-14 NOTE — PROGRESS NOTES
[facility]        NAME: Billy Carter is a [de-identified] y o  male  : 1940    MRN: [id number]  DATE: 2022  TIME: 10:43 AM    /90   Pulse 64   Temp 97 9 °F (36 6 °C)   Resp 16   Ht 5' 8" (1 727 m)   Wt 68 5 kg (151 lb)   SpO2 98%   BMI 22 96 kg/m²     Assessment and Plan   Hematuria, unspecified type [R31 9]  1  Hematuria, unspecified type  Urine culture    POCT urine dip         Patient Instructions       Follow up with PCP in 3-5 days  Proceed to  ER if symptoms worsen  Chief Complaint     Chief Complaint   Patient presents with   • Possible UTI     Patient reports hematuria with onset Saturday  Reports groin discomfort intermittently for one week  States doing heavy lifting within the past week  Denies any other symptoms            History of Present Illness       Pt with noticing blood in urine 2 days ago once, no problems yesterday or today, no pain no burning       Review of Systems   Review of Systems      Current Medications       Current Outpatient Medications:   •  aspirin 81 MG tablet, Take 1 tablet by mouth daily , Disp: , Rfl:   •  ezetimibe-simvastatin (Vytorin) 10-20 mg per tablet, Take 1 tablet by mouth daily, Disp: 90 tablet, Rfl: 1  •  lisinopril (ZESTRIL) 10 mg tablet, Take 1 tablet (10 mg total) by mouth daily, Disp: 90 tablet, Rfl: 1  •  diclofenac sodium (VOLTAREN) 1 %, Apply 2 g topically 4 (four) times a day (Patient not taking: No sig reported), Disp: 1 Tube, Rfl: 3  •  predniSONE 10 mg tablet, 6 tabs on Day 1,5 tabs on Day 2,4 tabs on Day 3,3 tabs on Day 4,2 tabs on  Day 5And 1 tab on Day 6 (Patient not taking: Reported on 2022), Disp: 21 tablet, Rfl: 0    Current Allergies     Allergies as of 2022 - Reviewed 2022   Allergen Reaction Noted   • Azithromycin Other (See Comments) 2016   • Meperidine Other (See Comments) 2013   • Pseudoephedrine Other (See Comments) 2013            The following portions of the patient's history were reviewed and updated as appropriate: allergies, current medications, past family history, past medical history, past social history, past surgical history and problem list      Past Medical History:   Diagnosis Date   • Chronic sinusitis     Last Assessed:3/26/14   • Colon polyp    • Degeneration of cervical intervertebral disc     Last Assessed:3/26/14   • Derangement of unspecified medial meniscus due to old tear or injury, left knee     Last Assessed:4/10/14   • Erythema migrans (Lyme disease)     Last Assessed:5/22/13   • Eustachian tube dysfunction     Last Assessed:9/29/15   • Hyperlipidemia    • Hypertension    • Osteoarthritis of wrist     Last Assessed:5/13/15   • Primary osteoarthritis of left knee     Last Assessed:7/16/14   • Spondylosis of cervical region without myelopathy or radiculopathy     Last Assessed:3/31/14   • TMJ syndrome     Last Assessed:12/30/14   • Trigger finger of left hand     Last Assessed:5/13/15   • Trigger finger of right hand     Little finger, middle finger       Past Surgical History:   Procedure Laterality Date   • CATARACT EXTRACTION     • COLONOSCOPY     • KNEE SURGERY     • NEUROPLASTY / TRANSPOSITION MEDIAN NERVE AT CARPAL TUNNEL     • NM INCISE FINGER TENDON SHEATH Left 4/25/2019    Procedure: RELEASE TRIGGER FINGER - LEFT SMALL;  Surgeon: Michelle Cramer MD;  Location: [facility];  Service: Orthopedics   • NM REVISE MEDIAN N/CARPAL TUNNEL SURG Left 4/25/2019    Procedure: RELEASE CARPAL TUNNEL OPEN;  Surgeon: Michelle Cramer MD;  Location: [facility];  Service: Orthopedics   • SHOULDER ARTHROSCOPY Bilateral    • SHOULDER SURGERY     • TONSILLECTOMY     • TONSILLECTOMY         Family History   Problem Relation Age of Onset   • Heart disease Mother         Coronary heart disease   • Dementia Mother    • Heart disease Father         Coronary heart disease   • Lung cancer Paternal Grandmother          Medications have been verified          Objective   BP 158/90   Pulse 64   Temp 97 9 °F (36 6 °C)   Resp 16   Ht 5' 8" (1 727 m)   Wt 68 5 kg (151 lb)   SpO2 98%   BMI 22 96 kg/m²        Physical Exam     Physical Exam  Vitals and nursing note reviewed  Constitutional:       Appearance: Normal appearance  He is normal weight  Comments: Discussed with pt  Urine today in office wnl  Discussed with pt about receck with family doctor for possible ultrasound or cystoscopy, pt will hemant for further testing to family doctor    HENT:      Head: Normocephalic and atraumatic  Right Ear: Tympanic membrane, ear canal and external ear normal       Left Ear: Tympanic membrane and ear canal normal       Nose: Nose normal       Mouth/Throat:      Mouth: Mucous membranes are moist       Pharynx: Oropharynx is clear  Eyes:      Extraocular Movements: Extraocular movements intact  Conjunctiva/sclera: Conjunctivae normal       Pupils: Pupils are equal, round, and reactive to light  Cardiovascular:      Rate and Rhythm: Normal rate and regular rhythm  Pulses: Normal pulses  Heart sounds: Normal heart sounds  Pulmonary:      Effort: Pulmonary effort is normal       Breath sounds: Normal breath sounds  Abdominal:      General: Abdomen is flat  Bowel sounds are normal       Palpations: Abdomen is soft  Musculoskeletal:         General: Normal range of motion  Cervical back: Normal range of motion and neck supple  Skin:     General: Skin is warm  Capillary Refill: Capillary refill takes less than 2 seconds  Neurological:      General: No focal deficit present  Mental Status: He is alert and oriented to person, place, and time     Psychiatric:         Mood and Affect: Mood normal          Behavior: Behavior normal

## 2022-11-16 LAB — BACTERIA UR CULT: NORMAL

## 2022-11-18 ENCOUNTER — OFFICE VISIT (OUTPATIENT)
Dept: FAMILY MEDICINE CLINIC | Facility: CLINIC | Age: 82
End: 2022-11-18

## 2022-11-18 VITALS
DIASTOLIC BLOOD PRESSURE: 94 MMHG | BODY MASS INDEX: 22.58 KG/M2 | RESPIRATION RATE: 17 BRPM | HEIGHT: 68 IN | SYSTOLIC BLOOD PRESSURE: 180 MMHG | HEART RATE: 62 BPM | TEMPERATURE: 96.6 F | WEIGHT: 149 LBS | OXYGEN SATURATION: 95 %

## 2022-11-18 DIAGNOSIS — E78.5 HYPERLIPIDEMIA, UNSPECIFIED HYPERLIPIDEMIA TYPE: ICD-10-CM

## 2022-11-18 DIAGNOSIS — R31.9 HEMATURIA, UNSPECIFIED TYPE: Primary | ICD-10-CM

## 2022-11-18 DIAGNOSIS — Z13.6 SCREENING FOR CARDIOVASCULAR CONDITION: ICD-10-CM

## 2022-11-18 DIAGNOSIS — Z12.5 SCREENING FOR PROSTATE CANCER: ICD-10-CM

## 2022-11-18 LAB
SL AMB  POCT GLUCOSE, UA: NORMAL
SL AMB LEUKOCYTE ESTERASE,UA: NORMAL
SL AMB POCT BILIRUBIN,UA: NORMAL
SL AMB POCT BLOOD,UA: NORMAL
SL AMB POCT CLARITY,UA: CLEAR
SL AMB POCT COLOR,UA: YELLOW
SL AMB POCT KETONES,UA: NORMAL
SL AMB POCT NITRITE,UA: NORMAL
SL AMB POCT PH,UA: 7.5
SL AMB POCT SPECIFIC GRAVITY,UA: 1.01
SL AMB POCT URINE PROTEIN: NORMAL
SL AMB POCT UROBILINOGEN: NORMAL

## 2022-11-18 NOTE — PROGRESS NOTES
Assessment/Plan:     Diagnoses and all orders for this visit:    Hematuria, unspecified type  -     POCT urine dip  -     US kidney and bladder; Future  -     CBC and differential; Future  -     UA (URINE) with reflex to Scope    Hyperlipidemia, unspecified hyperlipidemia type  -     Lipid panel; Future    Screening for prostate cancer  -     UA (URINE) with reflex to Scope  -     PSA, Total Screen; Future    Screening for cardiovascular condition  -     CBC and differential; Future  -     Comprehensive metabolic panel; Future  -     Lipid panel; Future      patient's urine was normal at both the urgent care visit as well as today   Will check a kidney and bladder ultrasound  Labs ordered   Patient's blood pressure is elevated today patient has known white coat hypertension he has his blood pressure is normal he checks it at home so will continue monitor   Patient will be due back in 2 weeks for his Medicare wellness visit will recheck his blood pressure as well as go over his lab work      Subjective:     Chief Complaint   Patient presents with   • Follow-up     Urgent Care  days ago blood in urine        Patient ID: Heidi Terry is a [de-identified] y o  male  Patient states that he had an episode of hematuria  Where he had blood in his urine  Went to the urgent care   Workup was negative at that time with just a urinalysis  Has no symptoms or pain  He has had normal urine today      The following portions of the patient's history were reviewed and updated as appropriate: allergies, current medications, past family history, past medical history, past social history, past surgical history and problem list     Review of Systems   Constitutional: Negative  HENT: Negative  Eyes: Negative  Respiratory: Negative  Cardiovascular: Negative  Gastrointestinal: Negative  Endocrine: Negative  Genitourinary: Positive for hematuria  Musculoskeletal: Negative  Skin: Negative      Allergic/Immunologic: Negative  Neurological: Negative  Hematological: Negative  Psychiatric/Behavioral: Negative  All other systems reviewed and are negative  Objective:    Vitals:    11/18/22 1608   BP: (!) 180/94   BP Location: Left arm   Patient Position: Sitting   Cuff Size: Standard   Pulse: 62   Resp: 17   Temp: (!) 96 6 °F (35 9 °C)   TempSrc: Tympanic   SpO2: 95%   Weight: 67 6 kg (149 lb)   Height: 5' 8" (1 727 m)          Physical Exam  Vitals and nursing note reviewed  Constitutional:       General: He is not in acute distress  Appearance: He is well-developed and well-nourished  HENT:      Head: Normocephalic  Right Ear: External ear normal       Left Ear: External ear normal       Nose: Nose normal       Mouth/Throat:      Mouth: Oropharynx is clear and moist    Eyes:      General:         Right eye: No discharge  Left eye: No discharge  Extraocular Movements: EOM normal       Conjunctiva/sclera: Conjunctivae normal       Pupils: Pupils are equal, round, and reactive to light  Cardiovascular:      Rate and Rhythm: Normal rate and regular rhythm  Heart sounds: Normal heart sounds  Pulmonary:      Effort: Pulmonary effort is normal       Breath sounds: Normal breath sounds  Abdominal:      General: Bowel sounds are normal  There is no distension  Palpations: Abdomen is soft  Tenderness: There is no abdominal tenderness  Musculoskeletal:         General: Normal range of motion  Cervical back: Normal range of motion  Skin:     General: Skin is warm and dry  Findings: No rash  Neurological:      Mental Status: He is alert and oriented to person, place, and time  Cranial Nerves: No cranial nerve deficit  Psychiatric:         Mood and Affect: Mood and affect normal          Behavior: Behavior normal          Thought Content:  Thought content normal          Judgment: Judgment normal

## 2022-11-23 ENCOUNTER — HOSPITAL ENCOUNTER (OUTPATIENT)
Dept: ULTRASOUND IMAGING | Facility: HOSPITAL | Age: 82
Discharge: HOME | End: 2022-11-23

## 2022-11-23 DIAGNOSIS — R31.9 HEMATURIA, UNSPECIFIED TYPE: ICD-10-CM

## 2022-12-02 ENCOUNTER — RA CDI HCC (OUTPATIENT)
Dept: OTHER | Facility: HOSPITAL | Age: 82
End: 2022-12-02

## 2022-12-05 ENCOUNTER — APPOINTMENT (OUTPATIENT)
Dept: LAB | Facility: CLINIC | Age: 82
End: 2022-12-05

## 2022-12-05 DIAGNOSIS — R31.9 HEMATURIA, UNSPECIFIED TYPE: ICD-10-CM

## 2022-12-05 DIAGNOSIS — E78.5 HYPERLIPIDEMIA, UNSPECIFIED HYPERLIPIDEMIA TYPE: ICD-10-CM

## 2022-12-05 DIAGNOSIS — Z12.5 SCREENING FOR PROSTATE CANCER: ICD-10-CM

## 2022-12-05 DIAGNOSIS — Z13.6 SCREENING FOR CARDIOVASCULAR CONDITION: ICD-10-CM

## 2022-12-05 LAB
ALBUMIN SERPL BCP-MCNC: 3.6 G/DL (ref 3.5–5)
ALP SERPL-CCNC: 39 U/L (ref 46–116)
ALT SERPL W P-5'-P-CCNC: 38 U/L (ref 12–78)
ANION GAP SERPL CALCULATED.3IONS-SCNC: 3 MMOL/L (ref 4–13)
AST SERPL W P-5'-P-CCNC: 30 U/L (ref 5–45)
BASOPHILS # BLD AUTO: 0.04 THOUSANDS/ÂΜL (ref 0–0.1)
BASOPHILS NFR BLD AUTO: 1 % (ref 0–1)
BILIRUB SERPL-MCNC: 0.57 MG/DL (ref 0.2–1)
BILIRUB UR QL STRIP: NEGATIVE
BUN SERPL-MCNC: 15 MG/DL (ref 5–25)
CALCIUM SERPL-MCNC: 8.8 MG/DL (ref 8.3–10.1)
CHLORIDE SERPL-SCNC: 106 MMOL/L (ref 96–108)
CHOLEST SERPL-MCNC: 148 MG/DL
CLARITY UR: CLEAR
CO2 SERPL-SCNC: 28 MMOL/L (ref 21–32)
COLOR UR: COLORLESS
CREAT SERPL-MCNC: 0.78 MG/DL (ref 0.6–1.3)
EOSINOPHIL # BLD AUTO: 0.06 THOUSAND/ÂΜL (ref 0–0.61)
EOSINOPHIL NFR BLD AUTO: 1 % (ref 0–6)
ERYTHROCYTE [DISTWIDTH] IN BLOOD BY AUTOMATED COUNT: 13.3 % (ref 11.6–15.1)
GFR SERPL CREATININE-BSD FRML MDRD: 85 ML/MIN/1.73SQ M
GLUCOSE P FAST SERPL-MCNC: 95 MG/DL (ref 65–99)
GLUCOSE UR STRIP-MCNC: NEGATIVE MG/DL
HCT VFR BLD AUTO: 47.1 % (ref 36.5–49.3)
HDLC SERPL-MCNC: 63 MG/DL
HGB BLD-MCNC: 14.9 G/DL (ref 12–17)
HGB UR QL STRIP.AUTO: NEGATIVE
IMM GRANULOCYTES # BLD AUTO: 0.04 THOUSAND/UL (ref 0–0.2)
IMM GRANULOCYTES NFR BLD AUTO: 1 % (ref 0–2)
KETONES UR STRIP-MCNC: NEGATIVE MG/DL
LDLC SERPL CALC-MCNC: 74 MG/DL (ref 0–100)
LEUKOCYTE ESTERASE UR QL STRIP: NEGATIVE
LYMPHOCYTES # BLD AUTO: 1.69 THOUSANDS/ÂΜL (ref 0.6–4.47)
LYMPHOCYTES NFR BLD AUTO: 25 % (ref 14–44)
MCH RBC QN AUTO: 32.3 PG (ref 26.8–34.3)
MCHC RBC AUTO-ENTMCNC: 31.6 G/DL (ref 31.4–37.4)
MCV RBC AUTO: 102 FL (ref 82–98)
MONOCYTES # BLD AUTO: 0.7 THOUSAND/ÂΜL (ref 0.17–1.22)
MONOCYTES NFR BLD AUTO: 10 % (ref 4–12)
NEUTROPHILS # BLD AUTO: 4.33 THOUSANDS/ÂΜL (ref 1.85–7.62)
NEUTS SEG NFR BLD AUTO: 62 % (ref 43–75)
NITRITE UR QL STRIP: NEGATIVE
NONHDLC SERPL-MCNC: 85 MG/DL
NRBC BLD AUTO-RTO: 0 /100 WBCS
PH UR STRIP.AUTO: 6.5 [PH]
PLATELET # BLD AUTO: 211 THOUSANDS/UL (ref 149–390)
PMV BLD AUTO: 11.5 FL (ref 8.9–12.7)
POTASSIUM SERPL-SCNC: 4.5 MMOL/L (ref 3.5–5.3)
PROT SERPL-MCNC: 6.6 G/DL (ref 6.4–8.4)
PROT UR STRIP-MCNC: NEGATIVE MG/DL
PSA SERPL-MCNC: 1.3 NG/ML (ref 0–4)
RBC # BLD AUTO: 4.62 MILLION/UL (ref 3.88–5.62)
SODIUM SERPL-SCNC: 137 MMOL/L (ref 135–147)
SP GR UR STRIP.AUTO: 1 (ref 1–1.03)
TRIGL SERPL-MCNC: 53 MG/DL
UROBILINOGEN UR STRIP-ACNC: <2 MG/DL
WBC # BLD AUTO: 6.86 THOUSAND/UL (ref 4.31–10.16)

## 2022-12-09 ENCOUNTER — OFFICE VISIT (OUTPATIENT)
Dept: FAMILY MEDICINE CLINIC | Facility: CLINIC | Age: 82
End: 2022-12-09

## 2022-12-09 VITALS
SYSTOLIC BLOOD PRESSURE: 132 MMHG | BODY MASS INDEX: 22.58 KG/M2 | WEIGHT: 149 LBS | HEIGHT: 68 IN | TEMPERATURE: 97.8 F | RESPIRATION RATE: 18 BRPM | HEART RATE: 85 BPM | OXYGEN SATURATION: 98 % | DIASTOLIC BLOOD PRESSURE: 84 MMHG

## 2022-12-09 DIAGNOSIS — N52.9 ERECTILE DYSFUNCTION, UNSPECIFIED ERECTILE DYSFUNCTION TYPE: ICD-10-CM

## 2022-12-09 DIAGNOSIS — R39.11 BENIGN PROSTATIC HYPERPLASIA WITH URINARY HESITANCY: ICD-10-CM

## 2022-12-09 DIAGNOSIS — N40.1 BENIGN PROSTATIC HYPERPLASIA WITH URINARY HESITANCY: ICD-10-CM

## 2022-12-09 DIAGNOSIS — Z00.00 MEDICARE ANNUAL WELLNESS VISIT, SUBSEQUENT: Primary | ICD-10-CM

## 2022-12-09 RX ORDER — SILDENAFIL CITRATE 20 MG/1
TABLET ORAL
Qty: 50 TABLET | Refills: 1 | Status: SHIPPED | OUTPATIENT
Start: 2022-12-09

## 2022-12-09 RX ORDER — TAMSULOSIN HYDROCHLORIDE 0.4 MG/1
0.4 CAPSULE ORAL
Qty: 30 CAPSULE | Refills: 3 | Status: SHIPPED | OUTPATIENT
Start: 2022-12-09

## 2022-12-09 RX ORDER — SILDENAFIL CITRATE 20 MG/1
20 TABLET ORAL 3 TIMES DAILY
Qty: 50 TABLET | Refills: 1 | Status: SHIPPED | OUTPATIENT
Start: 2022-12-09 | End: 2022-12-09 | Stop reason: SDUPTHER

## 2022-12-09 NOTE — PROGRESS NOTES
Assessment and Plan:     Problem List Items Addressed This Visit    None  Visit Diagnoses     Medicare annual wellness visit, subsequent    -  Primary    Benign prostatic hyperplasia with urinary hesitancy        Relevant Medications    tamsulosin (FLOMAX) 0 4 mg    Erectile dysfunction, unspecified erectile dysfunction type        Relevant Medications    sildenafil (REVATIO) 20 mg tablet        Medicare wellness visit completed  We will try tamsulosin and see if helps with his urinary issues  Sildenafil ordered as well  Patient to follow-up in 6 to 12 months or sooner if needed          Depression Screening and Follow-up Plan: Patient was screened for depression during today's encounter  They screened negative with a PHQ-2 score of 0  Preventive health issues were discussed with patient, and age appropriate screening tests were ordered as noted in patient's After Visit Summary  Personalized health advice and appropriate referrals for health education or preventive services given if needed, as noted in patient's After Visit Summary  History of Present Illness:     Patient presents for a Medicare Wellness Visit    Patient presents today for Medicare wellness visit  Patient is complaining of urinary frequency at night as well as difficulty with stream  He is also complaining of erectile dysfunction  we reviewed his recent ultrasound and lab work which was all normal       Patient Care Team:  Marilu Mansfield DO as PCP - MD Abelardo Goss MD     Review of Systems:     Review of Systems   Constitutional: Negative  HENT: Negative  Eyes: Negative  Respiratory: Negative  Cardiovascular: Negative  Gastrointestinal: Negative  Endocrine: Negative  Genitourinary: Positive for difficulty urinating  Musculoskeletal: Negative  Skin: Negative  Allergic/Immunologic: Negative  Neurological: Negative  Hematological: Negative  Psychiatric/Behavioral: Negative      All other systems reviewed and are negative         Problem List:     Patient Active Problem List   Diagnosis   • Allergic rhinitis   • Dermatitis, eczematoid   • Generalized osteoarthritis   • Hypertension   • Hyperlipidemia   • Numbness and tingling in left hand   • Trigger finger, left little finger   • Carpal tunnel syndrome of left wrist   • Carpal tunnel syndrome on left   • Finger mass, left   • Biceps tendonitis on right   • Subdural hygroma   • TMJ arthralgia      Past Medical and Surgical History:     Past Medical History:   Diagnosis Date   • Arthritis    • Chronic sinusitis     Last Assessed:3/26/14   • Colon polyp    • Degeneration of cervical intervertebral disc     Last Assessed:3/26/14   • Derangement of unspecified medial meniscus due to old tear or injury, left knee     Last Assessed:4/10/14   • Erythema migrans (Lyme disease)     Last Assessed:5/22/13   • Eustachian tube dysfunction     Last Assessed:9/29/15   • HL (hearing loss)    • Hyperlipidemia    • Hypertension    • Osteoarthritis of wrist     Last Assessed:5/13/15   • Primary osteoarthritis of left knee     Last Assessed:7/16/14   • Spondylosis of cervical region without myelopathy or radiculopathy     Last Assessed:3/31/14   • TMJ syndrome     Last Assessed:12/30/14   • Trigger finger of left hand     Last Assessed:5/13/15   • Trigger finger of right hand     Little finger, middle finger     Past Surgical History:   Procedure Laterality Date   • CATARACT EXTRACTION     • COLONOSCOPY     • KNEE SURGERY     • NEUROPLASTY / TRANSPOSITION MEDIAN NERVE AT CARPAL TUNNEL     • MD INCISE FINGER TENDON SHEATH Left 4/25/2019    Procedure: RELEASE TRIGGER FINGER - LEFT SMALL;  Surgeon: Emerita Dahl MD;  Location[facility];  Service: Orthopedics   • MD REVISE MEDIAN N/CARPAL TUNNEL SURG Left 4/25/2019    Procedure: RELEASE CARPAL TUNNEL OPEN;  Surgeon: Emerita Dahl MD;  Location[facility];  Service: Orthopedics   • SHOULDER ARTHROSCOPY Bilateral    • SHOULDER SURGERY     • TONSILLECTOMY     • TONSILLECTOMY        Family History:     Family History   Problem Relation Age of Onset   • Heart disease Mother         Coronary heart disease   • Dementia Mother    • Heart disease Father         Coronary heart disease   • Lung cancer Paternal Grandmother       Social History:     Social History     Socioeconomic History   • Marital status: /Civil Union     Spouse name: None   • Number of children: None   • Years of education: None   • Highest education level: None   Occupational History   • Occupation: Retired   Tobacco Use   • Smoking status: Former     Types: Cigars   • Smokeless tobacco: Never   Vaping Use   • Vaping Use: Never used   Substance and Sexual Activity   • Alcohol use: Yes     Alcohol/week: 14 0 standard drinks     Types: 14 Glasses of wine per week     Comment: Social drinker/Denies alcohol causing problems   • Drug use: No   • Sexual activity: Yes     Partners: Female   Other Topics Concern   • None   Social History Narrative    Exercises regularly     Social Determinants of Health     Financial Resource Strain: Unknown   • Difficulty of Paying Living Expenses: Patient refused   Food Insecurity: Not on file   Transportation Needs: No Transportation Needs   • Lack of Transportation (Medical): No   • Lack of Transportation (Non-Medical):  No   Physical Activity: Not on file   Stress: Not on file   Social Connections: Not on file   Intimate Partner Violence: Not on file   Housing Stability: Not on file      Medications and Allergies:     Current Outpatient Medications   Medication Sig Dispense Refill   • aspirin 81 MG tablet Take 1 tablet by mouth daily      • ezetimibe-simvastatin (Vytorin) 10-20 mg per tablet Take 1 tablet by mouth daily 90 tablet 1   • lisinopril (ZESTRIL) 10 mg tablet Take 1 tablet (10 mg total) by mouth daily 90 tablet 1   • sildenafil (REVATIO) 20 mg tablet 3-5 tabs daily as directed 50 tablet 1   • tamsulosin (FLOMAX) 0 4 mg Take 1 capsule (0 4 mg total) by mouth daily with dinner 30 capsule 3   • diclofenac sodium (VOLTAREN) 1 % Apply 2 g topically 4 (four) times a day (Patient not taking: Reported on 11/18/2020) 1 Tube 3     No current facility-administered medications for this visit  Allergies   Allergen Reactions   • Azithromycin Other (See Comments)     Cannot recall allergy or reaction   • Meperidine Other (See Comments)     Cannot recall allergy or reaction   • Pseudoephedrine Other (See Comments)     Cannot recall allergy or reaction      Immunizations:     Immunization History   Administered Date(s) Administered   • COVID-19 PFIZER VACCINE 0 3 ML IM 01/19/2021, 02/07/2021, 08/16/2021   • INFLUENZA 08/16/2021   • Influenza Split High Dose Preservative Free IM 09/20/2012, 10/07/2013, 09/30/2014, 09/22/2015, 09/07/2016, 10/04/2017   • Influenza, high dose seasonal 0 7 mL 09/25/2018, 10/15/2019, 09/29/2020   • Pneumococcal Conjugate 13-Valent 11/21/2016   • Pneumococcal Polysaccharide PPV23 09/19/2011, 05/17/2016   • TD (adult) Preservative Free 05/20/2015, 12/20/2016   • Td (adult), adsorbed 05/25/2005   • Tdap 12/30/2019   • Zoster 12/03/2017   • Zoster Vaccine Recombinant 04/08/2019, 06/10/2019      Health Maintenance:         Topic Date Due   • Colorectal Cancer Screening  Discontinued         Topic Date Due   • Hepatitis B Vaccine (1 of 3 - 3-dose series) Never done      Medicare Screening Tests and Risk Assessments:     Isrrael Gamboa is here for his Subsequent Wellness visit  Last Medicare Wellness visit information reviewed, patient interviewed and updates made to the record today  Health Risk Assessment:   Patient rates overall health as very good  Patient feels that their physical health rating is same  Patient is very satisfied with their life  Eyesight was rated as same  Hearing was rated as same  Patient feels that their emotional and mental health rating is same   Patients states they are sometimes angry  Patient states they are never, rarely unusually tired/fatigued  Pain experienced in the last 7 days has been none  Patient states that he has experienced no weight loss or gain in last 6 months  Depression Screening:   PHQ-2 Score: 0      Fall Risk Screening: In the past year, patient has experienced: no history of falling in past year      Home Safety:  Patient does not have trouble with stairs inside or outside of their home  Patient has working smoke alarms and has working carbon monoxide detector  Home safety hazards include: none  Nutrition:   Current diet is Regular  Medications:   Patient is currently taking over-the-counter supplements  OTC medications include: Coq10  B complex  Patient is able to manage medications  Activities of Daily Living (ADLs)/Instrumental Activities of Daily Living (IADLs):   Walk and transfer into and out of bed and chair?: Yes  Dress and groom yourself?: Yes    Bathe or shower yourself?: Yes    Feed yourself? Yes  Do your laundry/housekeeping?: Yes  Manage your money, pay your bills and track your expenses?: Yes  Make your own meals?: Yes    Do your own shopping?: Yes    Previous Hospitalizations:   Any hospitalizations or ED visits within the last 12 months?: No      Advance Care Planning:   Living will: Yes    Durable POA for healthcare:  Yes    Advanced directive: Yes    Advanced directive counseling given: Yes    Five wishes given: No    End of Life Decisions reviewed with patient: Yes    Provider agrees with end of life decisions: Yes      Cognitive Screening:   Provider or family/friend/caregiver concerned regarding cognition?: No    PREVENTIVE SCREENINGS      Cardiovascular Screening:    General: History Lipid Disorder and Screening Current      Diabetes Screening:     General: Screening Current      Colorectal Cancer Screening:     General: Screening Current      Prostate Cancer Screening:    General: Screening Current      Osteoporosis Screening: General: Screening Not Indicated      Abdominal Aortic Aneurysm (AAA) Screening:    Risk factors include: tobacco use        General: Screening Not Indicated      Lung Cancer Screening:     General: Screening Not Indicated      Hepatitis C Screening:    General: Screening Not Indicated    Screening, Brief Intervention, and Referral to Treatment (SBIRT)    Screening  Typical number of drinks in a day: 2  Typical number of drinks in a week: 15  Interpretation: Low risk drinking behavior  AUDIT-C Screenin) How often did you have a drink containing alcohol in the past year? 4 or more times a week  2) How many drinks did you have on a typical day when you were drinking in the past year? 1 to 2  3) How often did you have 6 or more drinks on one occasion in the past year? never    AUDIT-C Score: 4  Interpretation: Score 4-12 (male): POSITIVE screen for alcohol misuse    AUDIT Screenin) How often during the last year have you found that you were not able to stop drinking once you had started? 0 - never  5) How often during the last year have you failed to do what was normally expected from you because of drinking? 0 - never  6) How often during the last year have you needed a first drink in the morning to get yourself going after a heavy drinking session? 0 - never  7) How often during the last year have you had a feeling of guilt or remorse after drinking? 0 - never  8) How often during the last year have you been unable to remember what happened the night before because you had been drinking?  1 - less than monthly  9) Have you or someone else been injured as a result of your drinking? 0 - no  10) Has a relative or friend or a doctor or another health worker been concerned about your drinking or suggested you cut down? 0 - no    AUDIT Score: 5  Interpretation: Low risk alcohol consumption    Single Item Drug Screening:  How often have you used an illegal drug (including marijuana) or a prescription medication for non-medical reasons in the past year? never    Single Item Drug Screen Score: 0  Interpretation: Negative screen for possible drug use disorder    Brief Intervention  Alcohol & drug use screenings were reviewed  No concerns regarding substance use disorder identified  Other Counseling Topics:   Car/seat belt/driving safety, skin self-exam, sunscreen and regular weightbearing exercise and calcium and vitamin D intake  No results found  Physical Exam:     /84 (BP Location: Right arm, Patient Position: Sitting, Cuff Size: Large)   Pulse 85   Temp 97 8 °F (36 6 °C) (Tympanic)   Resp 18   Ht 5' 8" (1 727 m)   Wt 67 6 kg (149 lb)   SpO2 98%   BMI 22 66 kg/m²     Physical Exam  Constitutional:       Appearance: He is well-developed  HENT:      Head: Normocephalic  Right Ear: External ear normal       Left Ear: External ear normal       Nose: Nose normal    Eyes:      Conjunctiva/sclera: Conjunctivae normal       Pupils: Pupils are equal, round, and reactive to light  Cardiovascular:      Rate and Rhythm: Normal rate and regular rhythm  Heart sounds: Normal heart sounds  Pulmonary:      Effort: Pulmonary effort is normal       Breath sounds: Normal breath sounds  Abdominal:      General: Bowel sounds are normal       Palpations: Abdomen is soft  Musculoskeletal:         General: Normal range of motion  Cervical back: Normal range of motion and neck supple  Skin:     General: Skin is warm and dry  Neurological:      Mental Status: He is alert and oriented to person, place, and time  Psychiatric:         Behavior: Behavior normal          Thought Content:  Thought content normal          Judgment: Judgment normal           Raymon Page, DO

## 2022-12-12 ENCOUNTER — TELEPHONE (OUTPATIENT)
Dept: FAMILY MEDICINE CLINIC | Facility: CLINIC | Age: 82
End: 2022-12-12

## 2022-12-12 NOTE — TELEPHONE ENCOUNTER
Patient called  He started with congestion and a head cold  He said it settled in his ear  This all started Saturday  He has no plans to test for covid, so I told him that I could not have him come in the office  I told him I would leave a message for you  He was fine with that   [phone]  Thank you

## 2022-12-13 ENCOUNTER — OFFICE VISIT (OUTPATIENT)
Dept: FAMILY MEDICINE CLINIC | Facility: CLINIC | Age: 82
End: 2022-12-13

## 2022-12-13 VITALS
HEIGHT: 68 IN | DIASTOLIC BLOOD PRESSURE: 88 MMHG | RESPIRATION RATE: 16 BRPM | TEMPERATURE: 98.5 F | WEIGHT: 150.8 LBS | HEART RATE: 63 BPM | BODY MASS INDEX: 22.85 KG/M2 | SYSTOLIC BLOOD PRESSURE: 154 MMHG | OXYGEN SATURATION: 97 %

## 2022-12-13 DIAGNOSIS — H69.83 DYSFUNCTION OF BOTH EUSTACHIAN TUBES: Primary | ICD-10-CM

## 2022-12-13 RX ORDER — METHYLPREDNISOLONE 4 MG/1
TABLET ORAL
Qty: 1 EACH | Refills: 0 | Status: SHIPPED | OUTPATIENT
Start: 2022-12-13

## 2022-12-13 RX ORDER — METHYLPREDNISOLONE 4 MG/1
TABLET ORAL
Qty: 1 EACH | Refills: 0 | Status: SHIPPED | OUTPATIENT
Start: 2022-12-13 | End: 2022-12-13 | Stop reason: SDUPTHER

## 2022-12-13 NOTE — PROGRESS NOTES
Assessment/Plan:       Diagnoses and all orders for this visit:    Dysfunction of both eustachian tubes  -     Discontinue: methylPREDNISolone 4 MG tablet therapy pack; Use as directed on package  -     methylPREDNISolone 4 MG tablet therapy pack; Use as directed on package      Meds as above  Continue allergy meds would recommend wearing a mask when  servicing his wood-burning stove  Follow-up as needed      Subjective:     Chief Complaint   Patient presents with   • Earache     Patient reports that he woke up the morning of 12/10 feeling like his right ear is full of fluid and causing discomfort        Patient ID: Billy Carter is a [de-identified] y o  male  Patient complaining of right-sided ear pain along with some sinus pressure and congestion  Started 2 to 3 days ago      The following portions of the patient's history were reviewed and updated as appropriate: allergies, current medications, past family history, past medical history, past social history, past surgical history and problem list     Review of Systems   Constitutional: Negative  HENT: Positive for congestion and ear pain  Eyes: Negative  Respiratory: Negative  Cardiovascular: Negative  Gastrointestinal: Negative  Endocrine: Negative  Genitourinary: Negative  Musculoskeletal: Negative  Skin: Negative  Allergic/Immunologic: Negative  Neurological: Negative  Hematological: Negative  Psychiatric/Behavioral: Negative  All other systems reviewed and are negative  Objective:    Vitals:    12/13/22 0809   BP: 154/88   BP Location: Left arm   Patient Position: Sitting   Cuff Size: Standard   Pulse: 63   Resp: 16   Temp: 98 5 °F (36 9 °C)   TempSrc: Tympanic   SpO2: 97%   Weight: 68 4 kg (150 lb 12 8 oz)   Height: 5' 8" (1 727 m)          Physical Exam  Vitals and nursing note reviewed  Constitutional:       General: He is not in acute distress  Appearance: He is well-developed     HENT:      Head: Normocephalic  Right Ear: External ear normal       Left Ear: External ear normal       Ears:      Comments: Bilateral tympanic membrane bulging  No signs of infection TMJ is normal     Nose: Nose normal    Eyes:      General:         Right eye: No discharge  Left eye: No discharge  Conjunctiva/sclera: Conjunctivae normal       Pupils: Pupils are equal, round, and reactive to light  Cardiovascular:      Rate and Rhythm: Normal rate and regular rhythm  Heart sounds: Normal heart sounds  Pulmonary:      Effort: Pulmonary effort is normal       Breath sounds: Normal breath sounds  Abdominal:      General: Bowel sounds are normal  There is no distension  Palpations: Abdomen is soft  Tenderness: There is no abdominal tenderness  Musculoskeletal:         General: Normal range of motion  Cervical back: Normal range of motion  Skin:     General: Skin is warm and dry  Findings: No rash  Neurological:      Mental Status: He is alert and oriented to person, place, and time  Cranial Nerves: No cranial nerve deficit  Psychiatric:         Behavior: Behavior normal          Thought Content:  Thought content normal          Judgment: Judgment normal  constant

## 2023-02-01 DIAGNOSIS — M26.629 ARTHRALGIA OF TEMPOROMANDIBULAR JOINT, UNSPECIFIED LATERALITY: Primary | ICD-10-CM

## 2023-02-01 RX ORDER — PREDNISONE 10 MG/1
TABLET ORAL
Qty: 21 TABLET | Refills: 1 | Status: SHIPPED | OUTPATIENT
Start: 2023-02-01

## 2023-03-27 DIAGNOSIS — I10 ESSENTIAL HYPERTENSION: ICD-10-CM

## 2023-03-27 DIAGNOSIS — I10 HYPERTENSION, UNSPECIFIED TYPE: ICD-10-CM

## 2023-03-27 RX ORDER — LISINOPRIL 10 MG/1
TABLET ORAL
Qty: 90 TABLET | Refills: 1 | Status: SHIPPED | OUTPATIENT
Start: 2023-03-27

## 2023-03-27 RX ORDER — EZETIMIBE AND SIMVASTATIN 10; 20 MG/1; MG/1
TABLET ORAL
Qty: 90 TABLET | Refills: 1 | Status: SHIPPED | OUTPATIENT
Start: 2023-03-27

## 2023-07-19 ENCOUNTER — APPOINTMENT (OUTPATIENT)
Dept: RADIOLOGY | Facility: CLINIC | Age: 83
End: 2023-07-19
Payer: COMMERCIAL

## 2023-07-19 ENCOUNTER — TELEPHONE (OUTPATIENT)
Dept: FAMILY MEDICINE CLINIC | Facility: CLINIC | Age: 83
End: 2023-07-19

## 2023-07-19 ENCOUNTER — OFFICE VISIT (OUTPATIENT)
Dept: OBGYN CLINIC | Facility: CLINIC | Age: 83
End: 2023-07-19
Payer: COMMERCIAL

## 2023-07-19 VITALS
DIASTOLIC BLOOD PRESSURE: 80 MMHG | BODY MASS INDEX: 22.13 KG/M2 | WEIGHT: 146 LBS | HEIGHT: 68 IN | SYSTOLIC BLOOD PRESSURE: 140 MMHG

## 2023-07-19 DIAGNOSIS — M25.562 LEFT KNEE PAIN, UNSPECIFIED CHRONICITY: ICD-10-CM

## 2023-07-19 DIAGNOSIS — M17.12 PRIMARY OSTEOARTHRITIS OF LEFT KNEE: Primary | ICD-10-CM

## 2023-07-19 PROCEDURE — 99214 OFFICE O/P EST MOD 30 MIN: CPT | Performed by: ORTHOPAEDIC SURGERY

## 2023-07-19 PROCEDURE — 73564 X-RAY EXAM KNEE 4 OR MORE: CPT

## 2023-07-19 NOTE — ASSESSMENT & PLAN NOTE
Findings consistent with left knee osteoarthritis with effusion. Imaging was reviewed with patient. We discussed at length both operative and non-operative treatment. Advised if pain and swelling return, we could do an aspiration with cortisone injection. Patient should continue over-the-counter NSAID, ice, and rest as needed for discomfort. Patient can also use Aspercreme or Voltaren gel over his knee. Continue low impact exercising, avoiding high impact activities such as climbing, squatting or kneeling. Stationary bikes and swimming are good alternatives. Patient will return to the office as needed if symptoms worsen or fail to improve. All patient's questions were answered to his satisfaction. This note is created using dictation transcription. It may contain typographical errors, grammatical errors, improperly dictated words, background noise and other errors.
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
## 2023-07-19 NOTE — TELEPHONE ENCOUNTER
Appointment set for July 31
Harrison Schultz called and stated that he is having painful leg cramps and he believes that it is from the statons. Would I be able to put him in at the 4:00 spot today?       Please advise    Thank You
Detailed exam

## 2023-07-23 ENCOUNTER — RA CDI HCC (OUTPATIENT)
Dept: OTHER | Facility: HOSPITAL | Age: 83
End: 2023-07-23

## 2023-07-24 NOTE — PROGRESS NOTES
[address] [facility] coding opportunities       Chart reviewed, no opportunity found: CHART REVIEWED, NO OPPORTUNITY FOUND        Patients Insurance     Medicare Insurance: Medicare

## 2023-07-31 ENCOUNTER — OFFICE VISIT (OUTPATIENT)
Dept: FAMILY MEDICINE CLINIC | Facility: CLINIC | Age: 83
End: 2023-07-31
Payer: COMMERCIAL

## 2023-07-31 ENCOUNTER — TELEPHONE (OUTPATIENT)
Dept: UROLOGY | Facility: MEDICAL CENTER | Age: 83
End: 2023-07-31

## 2023-07-31 VITALS
WEIGHT: 146.2 LBS | HEIGHT: 68 IN | SYSTOLIC BLOOD PRESSURE: 152 MMHG | BODY MASS INDEX: 22.16 KG/M2 | DIASTOLIC BLOOD PRESSURE: 86 MMHG | RESPIRATION RATE: 20 BRPM | TEMPERATURE: 96.5 F | OXYGEN SATURATION: 99 % | HEART RATE: 66 BPM

## 2023-07-31 DIAGNOSIS — R35.1 BPH ASSOCIATED WITH NOCTURIA: ICD-10-CM

## 2023-07-31 DIAGNOSIS — R25.2 LEG CRAMPING: Primary | ICD-10-CM

## 2023-07-31 DIAGNOSIS — N40.1 BPH ASSOCIATED WITH NOCTURIA: ICD-10-CM

## 2023-07-31 DIAGNOSIS — E78.5 HYPERLIPIDEMIA, UNSPECIFIED HYPERLIPIDEMIA TYPE: ICD-10-CM

## 2023-07-31 PROCEDURE — 1160F RVW MEDS BY RX/DR IN RCRD: CPT | Performed by: FAMILY MEDICINE

## 2023-07-31 PROCEDURE — 1159F MED LIST DOCD IN RCRD: CPT | Performed by: FAMILY MEDICINE

## 2023-07-31 PROCEDURE — 99214 OFFICE O/P EST MOD 30 MIN: CPT | Performed by: FAMILY MEDICINE

## 2023-07-31 NOTE — TELEPHONE ENCOUNTER
New Patient Triage  Please Triage:   New Patient-   What is the reason for the patients appointment? N40.1, R35.1 (ICD-10-CM) - BPH associated with nocturia    Imaging/Lab Results: Insurance:   Do we accept the pt's insurance or is the patient Self-Pay? Provider & Plan:  Medicare   Member ID#: History  Has the pt had any previous urologist? no    Have pt records been requested?  No    Has the pt had any outside testing done? no    Does the pt have a personal history of cancer?: no

## 2023-07-31 NOTE — PROGRESS NOTES
Assessment/Plan:     Diagnoses and all orders for this visit:    Leg cramping    Hyperlipidemia, unspecified hyperlipidemia type  -     Lipid panel; Future  -     Comprehensive metabolic panel; Future    BPH associated with nocturia  -     Ambulatory Referral to Urology; Future       Patient referred to urology  We will continue to hold his cholesterol medicine  We will get lab work in 2 weeks  With him being off the medication  And will follow-up if still needs cholesterol treatment      Subjective:     Chief Complaint   Patient presents with   • Leg Cramps     Since starting simvastatin         Patient ID: Erica Solis is a 82 y.o. male. Patient presents today for leg cramps  Has been having cramps off and on now for many months  He says recently is gotten much worse  He stopped his cholesterol medicine 1 week ago and in 2 days his leg cramps resolved   patient is also having nocturia    and would like to see urology      The following portions of the patient's history were reviewed and updated as appropriate: allergies, current medications, past family history, past medical history, past social history, past surgical history and problem list.    Review of Systems   Constitutional: Negative. HENT: Negative. Eyes: Negative. Respiratory: Negative. Cardiovascular: Negative. Gastrointestinal: Negative. Endocrine: Negative. Genitourinary: Negative. Musculoskeletal: Positive for myalgias. Skin: Negative. Allergic/Immunologic: Negative. Neurological: Negative. Hematological: Negative. Psychiatric/Behavioral: Negative. All other systems reviewed and are negative.         Objective:    Vitals:    07/31/23 1133   BP: 152/86   BP Location: Right arm   Patient Position: Sitting   Cuff Size: Standard   Pulse: 66   Resp: 20   Temp: (!) 96.5 °F (35.8 °C)   TempSrc: Tympanic   SpO2: 99%   Weight: 66.3 kg (146 lb 3.2 oz)   Height: 5' 8" (1.727 m)          Physical Exam  Vitals and nursing note reviewed. Constitutional:       General: He is not in acute distress. Appearance: He is well-developed. HENT:      Head: Normocephalic. Right Ear: External ear normal.      Left Ear: External ear normal.      Nose: Nose normal.   Eyes:      General:         Right eye: No discharge. Left eye: No discharge. Conjunctiva/sclera: Conjunctivae normal.      Pupils: Pupils are equal, round, and reactive to light. Cardiovascular:      Rate and Rhythm: Normal rate and regular rhythm. Heart sounds: Normal heart sounds. Pulmonary:      Effort: Pulmonary effort is normal.      Breath sounds: Normal breath sounds. Abdominal:      General: Bowel sounds are normal. There is no distension. Palpations: Abdomen is soft. Tenderness: There is no abdominal tenderness. Musculoskeletal:         General: Normal range of motion. Cervical back: Normal range of motion. Skin:     General: Skin is warm and dry. Findings: No rash. Neurological:      Mental Status: He is alert and oriented to person, place, and time. Cranial Nerves: No cranial nerve deficit. Psychiatric:         Behavior: Behavior normal.         Thought Content:  Thought content normal.         Judgment: Judgment normal.

## 2023-08-03 DIAGNOSIS — I10 ESSENTIAL HYPERTENSION: ICD-10-CM

## 2023-08-03 RX ORDER — LISINOPRIL 10 MG/1
10 TABLET ORAL DAILY
Qty: 90 TABLET | Refills: 1 | Status: SHIPPED | OUTPATIENT
Start: 2023-08-03

## 2023-08-21 ENCOUNTER — APPOINTMENT (OUTPATIENT)
Dept: LAB | Facility: CLINIC | Age: 83
End: 2023-08-21
Payer: COMMERCIAL

## 2023-08-21 DIAGNOSIS — E78.5 HYPERLIPIDEMIA, UNSPECIFIED HYPERLIPIDEMIA TYPE: ICD-10-CM

## 2023-08-21 LAB
ALBUMIN SERPL BCP-MCNC: 3.9 G/DL (ref 3.5–5)
ALP SERPL-CCNC: 48 U/L (ref 46–116)
ALT SERPL W P-5'-P-CCNC: 33 U/L (ref 12–78)
ANION GAP SERPL CALCULATED.3IONS-SCNC: 6 MMOL/L
AST SERPL W P-5'-P-CCNC: 34 U/L (ref 5–45)
BILIRUB SERPL-MCNC: 0.57 MG/DL (ref 0.2–1)
BUN SERPL-MCNC: 15 MG/DL (ref 5–25)
CALCIUM SERPL-MCNC: 9.1 MG/DL (ref 8.3–10.1)
CHLORIDE SERPL-SCNC: 110 MMOL/L (ref 96–108)
CHOLEST SERPL-MCNC: 220 MG/DL
CO2 SERPL-SCNC: 25 MMOL/L (ref 21–32)
CREAT SERPL-MCNC: 0.81 MG/DL (ref 0.6–1.3)
GFR SERPL CREATININE-BSD FRML MDRD: 83 ML/MIN/1.73SQ M
GLUCOSE P FAST SERPL-MCNC: 87 MG/DL (ref 65–99)
HDLC SERPL-MCNC: 64 MG/DL
LDLC SERPL CALC-MCNC: 144 MG/DL (ref 0–100)
NONHDLC SERPL-MCNC: 156 MG/DL
POTASSIUM SERPL-SCNC: 4.4 MMOL/L (ref 3.5–5.3)
PROT SERPL-MCNC: 7 G/DL (ref 6.4–8.4)
SODIUM SERPL-SCNC: 141 MMOL/L (ref 135–147)
TRIGL SERPL-MCNC: 62 MG/DL

## 2023-08-21 PROCEDURE — 36415 COLL VENOUS BLD VENIPUNCTURE: CPT

## 2023-08-21 PROCEDURE — 80061 LIPID PANEL: CPT

## 2023-08-21 PROCEDURE — 80053 COMPREHEN METABOLIC PANEL: CPT

## 2023-08-24 ENCOUNTER — TELEPHONE (OUTPATIENT)
Dept: FAMILY MEDICINE CLINIC | Facility: CLINIC | Age: 83
End: 2023-08-24

## 2023-08-24 NOTE — TELEPHONE ENCOUNTER
Patient called. Patient was wondering if you could review his lab results when you return to the office. He was wondering what medications you might suggest for his cholesterol. Patient is requesting a call back. He is going away and may not answer the phone. Patient states we may leave a voicemail.     Thank you

## 2023-09-07 DIAGNOSIS — E78.5 HYPERLIPIDEMIA, UNSPECIFIED HYPERLIPIDEMIA TYPE: Primary | ICD-10-CM

## 2023-09-07 RX ORDER — EZETIMIBE 10 MG/1
10 TABLET ORAL DAILY
Qty: 90 TABLET | Refills: 1 | Status: SHIPPED | OUTPATIENT
Start: 2023-09-07 | End: 2024-03-05

## 2023-10-03 ENCOUNTER — OFFICE VISIT (OUTPATIENT)
Dept: OBGYN CLINIC | Facility: CLINIC | Age: 83
End: 2023-10-03
Payer: COMMERCIAL

## 2023-10-03 ENCOUNTER — APPOINTMENT (OUTPATIENT)
Dept: RADIOLOGY | Facility: CLINIC | Age: 83
End: 2023-10-03
Payer: COMMERCIAL

## 2023-10-03 VITALS
BODY MASS INDEX: 22.28 KG/M2 | DIASTOLIC BLOOD PRESSURE: 82 MMHG | SYSTOLIC BLOOD PRESSURE: 138 MMHG | HEIGHT: 68 IN | WEIGHT: 147 LBS

## 2023-10-03 DIAGNOSIS — M25.511 ACUTE PAIN OF RIGHT SHOULDER: ICD-10-CM

## 2023-10-03 DIAGNOSIS — S46.111A RUPTURE OF RIGHT LONG HEAD BICEPS TENDON, INITIAL ENCOUNTER: Primary | ICD-10-CM

## 2023-10-03 PROCEDURE — 99214 OFFICE O/P EST MOD 30 MIN: CPT | Performed by: ORTHOPAEDIC SURGERY

## 2023-10-03 PROCEDURE — 73030 X-RAY EXAM OF SHOULDER: CPT

## 2023-10-03 NOTE — ASSESSMENT & PLAN NOTE
Findings today are consistent with rupture of the long head of the biceps. Imaging and prognosis was reviewed with the patient today. Discussed with patient that surgery is not required for this injury and can be treated conservatively and that pain will subside over time. Patient should continue to use the arm, however should avoid heavy lifting and repetitive motions over the next several weeks. He can use ice and anti-inflammatories as needed. Patient can follow up as needed. All patient's questions were answered to his satisfaction. This note is created using dictation transcription. It may contain typographical errors, grammatical errors, improperly dictated words, background noise and other errors.

## 2023-10-03 NOTE — PROGRESS NOTES
Assessment:     1. Rupture of right long head biceps tendon, initial encounter        Plan:     Problem List Items Addressed This Visit        Musculoskeletal and Integument    Rupture of right long head biceps tendon - Primary     Findings today are consistent with rupture of the long head of the biceps. Imaging and prognosis was reviewed with the patient today. Discussed with patient that surgery is not required for this injury and can be treated conservatively and that pain will subside over time. Patient should continue to use the arm, however should avoid heavy lifting and repetitive motions over the next several weeks. He can use ice and anti-inflammatories as needed. Patient can follow up as needed. All patient's questions were answered to his satisfaction. This note is created using dictation transcription. It may contain typographical errors, grammatical errors, improperly dictated words, background noise and other errors. Relevant Orders    XR shoulder 2+ vw right      Subjective:     Patient ID: Dom Butcher is a 82 y.o. male. Chief Complaint:  The patient presents with a chief complaint of right shoulder pain. The pain began 2 week(s) ago and is associated with an acute injury. He does note he was racking concrete and felt a pop/pull. He notes he saw a deformity over the biceps. He states over the last couple of days the deformity has improved as well as his pain. The patient describes the pain as aching and burning in intensity,  intermittent in timing, and localizes the pain to the  right biceps tendon. The pain is worse with lifting or anything requiering a biceps curl  and relieved by rest.  The pain is not associated with numbness and tingling. The pain is not associated with constitutional symptoms. The patient is not awoken at night by the pain. Patient previously injured his right biceps in 2019- diagnosed with partial biceps tear. He has not had complications in several year. Allergy:  Allergies   Allergen Reactions   • Azithromycin Other (See Comments)     Cannot recall allergy or reaction   • Meperidine Other (See Comments)     Cannot recall allergy or reaction   • Pseudoephedrine Other (See Comments)     Cannot recall allergy or reaction     Medications:  all current active meds have been reviewed  Past Medical History:  Past Medical History:   Diagnosis Date   • Arthritis    • Chronic sinusitis     Last Assessed:3/26/14   • Colon polyp    • Degeneration of cervical intervertebral disc     Last Assessed:3/26/14   • Derangement of unspecified medial meniscus due to old tear or injury, left knee     Last Assessed:4/10/14   • Erythema migrans (Lyme disease)     Last Assessed:5/22/13   • Eustachian tube dysfunction     Last Assessed:9/29/15   • HL (hearing loss)    • Hyperlipidemia    • Hypertension    • Osteoarthritis of wrist     Last Assessed:5/13/15   • Primary osteoarthritis of left knee     Last Assessed:7/16/14   • Spondylosis of cervical region without myelopathy or radiculopathy     Last Assessed:3/31/14   • TMJ syndrome     Last Assessed:12/30/14   • Trigger finger of left hand     Last Assessed:5/13/15   • Trigger finger of right hand     Little finger, middle finger     Past Surgical History:  Past Surgical History:   Procedure Laterality Date   • CATARACT EXTRACTION     • COLONOSCOPY     • KNEE SURGERY     • NEUROPLASTY / TRANSPOSITION MEDIAN NERVE AT CARPAL TUNNEL     • NE NEUROPLASTY &/TRANSPOS MEDIAN NRV CARPAL TUNNE Left 4/25/2019    Procedure: RELEASE CARPAL TUNNEL OPEN;  Surgeon: Noemi Maloney MD;  Location: [facility];  Service: Orthopedics   • NE TENDON SHEATH INCISION Left 4/25/2019    Procedure: RELEASE TRIGGER FINGER - LEFT SMALL;  Surgeon: Noemi Maloney MD;  Location: [facility];  Service: Orthopedics   • SHOULDER ARTHROSCOPY Bilateral    • SHOULDER SURGERY     • TONSILLECTOMY     • TONSILLECTOMY       Family History:  Family History   Problem Relation Age of Onset   • Heart disease Mother         Coronary heart disease   • Dementia Mother    • Heart disease Father         Coronary heart disease   • Lung cancer Paternal Grandmother      Social History:  Social History     Substance and Sexual Activity   Alcohol Use Yes   • Alcohol/week: 14.0 standard drinks of alcohol   • Types: 14 Glasses of wine per week    Comment: Social drinker/Denies alcohol causing problems     Social History     Substance and Sexual Activity   Drug Use No     Social History     Tobacco Use   Smoking Status Former   • Types: Cigars   Smokeless Tobacco Never     Review of Systems   Constitutional: Negative for chills and fever. HENT: Negative for ear pain and sore throat. Eyes: Negative for pain and visual disturbance. Respiratory: Negative for cough and shortness of breath. Cardiovascular: Negative for chest pain and palpitations. Gastrointestinal: Negative for abdominal pain and vomiting. Genitourinary: Negative for dysuria and hematuria. Musculoskeletal: Positive for arthralgias (right biceps). Negative for back pain and joint swelling. Skin: Negative for color change and rash. Neurological: Negative for seizures and syncope. Psychiatric/Behavioral: Negative. All other systems reviewed and are negative. Objective:  BP Readings from Last 1 Encounters:   10/03/23 138/82      Wt Readings from Last 1 Encounters:   10/03/23 66.7 kg (147 lb)      BMI:   Estimated body mass index is 22.35 kg/m² as calculated from the following:    Height as of this encounter: 5' 8" (1.727 m). Weight as of this encounter: 66.7 kg (147 lb). BSA:   Estimated body surface area is 1.79 meters squared as calculated from the following:    Height as of this encounter: 5' 8" (1.727 m). Weight as of this encounter: 66.7 kg (147 lb). Physical Exam  Vitals and nursing note reviewed. Constitutional:       Appearance: Normal appearance. He is well-developed.    HENT: Head: Normocephalic and atraumatic. Right Ear: External ear normal.      Left Ear: External ear normal.   Eyes:      Extraocular Movements: Extraocular movements intact. Conjunctiva/sclera: Conjunctivae normal.   Pulmonary:      Effort: Pulmonary effort is normal.   Musculoskeletal:      Cervical back: Neck supple. Skin:     General: Skin is warm and dry. Neurological:      Mental Status: He is alert and oriented to person, place, and time. Psychiatric:         Mood and Affect: Mood normal.         Behavior: Behavior normal.       Right Shoulder Exam     Tenderness   The patient is experiencing tenderness in the biceps tendon. Range of Motion   The patient has normal right shoulder ROM. Muscle Strength   Abduction: 4/5   Internal rotation: 5/5   External rotation: 4/5   Biceps: 4/5     Tests   Cross arm: negative  Drop arm: negative    Other   Erythema: absent  Scars: absent  Sensation: normal  Pulse: present    Comments:  Blayne deformity            I have personally reviewed pertinent films in PACS and my interpretation is XR right shoulder: demonstrates degenerative changes to the [facility] and glenohumeral joints- OA.      Scribe Attestation    I,:  True Ross am acting as a scribe while in the presence of the attending physician.:       I,:  Darci Badillo MD personally performed the services described in this documentation    as scribed in my presence.:

## 2023-10-26 ENCOUNTER — OFFICE VISIT (OUTPATIENT)
Dept: UROLOGY | Facility: AMBULATORY SURGERY CENTER | Age: 83
End: 2023-10-26
Payer: COMMERCIAL

## 2023-10-26 VITALS
HEIGHT: 68 IN | HEART RATE: 74 BPM | BODY MASS INDEX: 21.98 KG/M2 | DIASTOLIC BLOOD PRESSURE: 86 MMHG | WEIGHT: 145 LBS | SYSTOLIC BLOOD PRESSURE: 160 MMHG

## 2023-10-26 DIAGNOSIS — R35.1 BPH ASSOCIATED WITH NOCTURIA: Primary | ICD-10-CM

## 2023-10-26 DIAGNOSIS — N40.1 BPH ASSOCIATED WITH NOCTURIA: Primary | ICD-10-CM

## 2023-10-26 LAB — POST-VOID RESIDUAL VOLUME, ML POC: 12 ML

## 2023-10-26 PROCEDURE — 51798 US URINE CAPACITY MEASURE: CPT | Performed by: UROLOGY

## 2023-10-26 PROCEDURE — 99203 OFFICE O/P NEW LOW 30 MIN: CPT | Performed by: UROLOGY

## 2023-10-26 NOTE — PROGRESS NOTES
Assessment/Plan:    BPH associated with nocturia  The patient is primarily bothered by nocturia. He does not have significant issues with daytime frequency or urgency. His stream is also decent and he empties his bladder well based on PVR. We discussed options. We can try mirabegron. We can also consider Kegel exercises. We could also consider daily Cialis. We could consider another alpha-blocker but I think that is low yield since he did not see improvement on Flomax. There could be a role for outlet surgery although it does not seem strong since he did not have obstructive issues and the empties well (he also appears to be bothered by the idea of retrograde ejaculation). He would like to try mirabegron and work on Kegel exercises. Instructions provided. He will see us back in 4 months to reassess. Subjective:      Patient ID: Jorge Luis Quinones is a 82 y.o. male. HPI  60-year-old male with lower urinary tract symptoms primarily manifesting in nocturia. The patient reports he has nocturia 3-4 times a night. During the day his frequency is not significant going every 2-3 hours. He has mild urgency issues. Does not strain to void. Feels like he empties his bladder well. PVR today was 12 cc. Denies sleep apnea or snoring. He has tried Flomax in the past without success. He stops drinking after dinner. The patient's AUA score is 12/4 with most of his symptoms coming from frequency and nocturia.     The patient had a renal bladder ultrasound in November 2022 for hematuria which was unremarkable with prostate measuring approximately 60g    PSA in December 2022 was 1.3 which is stable from years past    Past Surgical History:   Procedure Laterality Date    CATARACT EXTRACTION      COLONOSCOPY      KNEE SURGERY      NEUROPLASTY / TRANSPOSITION MEDIAN NERVE AT CARPAL TUNNEL      WI NEUROPLASTY &/TRANSPOS MEDIAN NRV CARPAL TUNNE Left 4/25/2019    Procedure: RELEASE CARPAL TUNNEL OPEN; Surgeon: Mckayla Youngblood MD;  Location: [facility];  Service: Orthopedics    ID TENDON SHEATH INCISION Left 4/25/2019    Procedure: RELEASE TRIGGER FINGER - LEFT SMALL;  Surgeon: Mckayla Youngblood MD;  Location: [facility];  Service: Orthopedics    SHOULDER ARTHROSCOPY Bilateral     SHOULDER SURGERY      TONSILLECTOMY      TONSILLECTOMY          Past Medical History:   Diagnosis Date    Arthritis     Chronic sinusitis     Last Assessed:3/26/14    Colon polyp     Degeneration of cervical intervertebral disc     Last Assessed:3/26/14    Derangement of unspecified medial meniscus due to old tear or injury, left knee     Last Assessed:4/10/14    Erythema migrans (Lyme disease)     Last Assessed:5/22/13    Eustachian tube dysfunction     Last Assessed:9/29/15    HL (hearing loss)     Hyperlipidemia     Hypertension     Osteoarthritis of wrist     Last Assessed:5/13/15    Primary osteoarthritis of left knee     Last Assessed:7/16/14    Spondylosis of cervical region without myelopathy or radiculopathy     Last Assessed:3/31/14    TMJ syndrome     Last Assessed:12/30/14    Trigger finger of left hand     Last Assessed:5/13/15    Trigger finger of right hand     Little finger, middle finger        AUA SYMPTOM SCORE      Flowsheet Row Most Recent Value   AUA SYMPTOM SCORE    How often have you had a sensation of not emptying your bladder completely after you finished urinating? 1 (P)     How often have you had to urinate again less than two hours after you finished urinating? 1 (P)     How often have you found you stopped and started again several times when you urinate? 0 (P)     How often have you found it difficult to postpone urination? 2 (P)     How often have you had a weak urinary stream? 2 (P)     How often have you had to push or strain to begin urination? 1 (P)     How many times did you most typically get up to urinate from the time you went to bed at night until the time you got up in the morning? 5 (P) Quality of Life: If you were to spend the rest of your life with your urinary condition just the way it is now, how would you feel about that? 4 (P)     AUA SYMPTOM SCORE 12 (P)              Review of Systems   Constitutional:  Negative for chills and fever. HENT:  Negative for ear pain and sore throat. Eyes:  Negative for pain and visual disturbance. Respiratory:  Negative for cough and shortness of breath. Cardiovascular:  Negative for chest pain and palpitations. Gastrointestinal:  Negative for abdominal pain and vomiting. Genitourinary:  Negative for dysuria and hematuria. Musculoskeletal:  Negative for arthralgias and back pain. Skin:  Negative for color change and rash. Neurological:  Negative for seizures and syncope. All other systems reviewed and are negative. Objective:      /86   Pulse 74   Ht 5' 8" (1.727 m)   Wt 65.8 kg (145 lb)   BMI 22.05 kg/m²     Lab Results   Component Value Date    PSA 1.3 12/05/2022    PSA 1.2 12/02/2021    PSA 1.2 12/01/2020    PSA 1.8 11/26/2019    PSA 1.2 11/14/2018    PSA 1.3 11/21/2017    PSA 1.0 11/09/2016    PSA 1.7 11/09/2015    PSA 1.4 11/07/2014    PSA 3.7 05/08/2014          Physical Exam  Constitutional:       Appearance: Normal appearance. HENT:      Head: Normocephalic and atraumatic. Eyes:      Extraocular Movements: Extraocular movements intact. Pupils: Pupils are equal, round, and reactive to light. Cardiovascular:      Rate and Rhythm: Normal rate. Abdominal:      General: Abdomen is flat. There is no distension. Palpations: There is no mass. Tenderness: There is no abdominal tenderness. There is no right CVA tenderness, left CVA tenderness or guarding. Genitourinary:     Comments: [facility] showed 50 grams firm prostate but no nodules  Musculoskeletal:      Right lower leg: No edema. Left lower leg: No edema. Skin:     General: Skin is warm and dry. Coloration: Skin is not jaundiced. Findings: No bruising. Neurological:      General: No focal deficit present. Mental Status: He is alert and oriented to person, place, and time. Mental status is at baseline. Psychiatric:         Mood and Affect: Mood normal.         Thought Content:  Thought content normal.         Judgment: Judgment normal.           Orders  Orders Placed This Encounter   Procedures    POCT Measure PVR

## 2023-10-26 NOTE — ASSESSMENT & PLAN NOTE
The patient is primarily bothered by nocturia. He does not have significant issues with daytime frequency or urgency. His stream is also decent and he empties his bladder well based on PVR. We discussed options. We can try mirabegron. We can also consider Kegel exercises. We could also consider daily Cialis. We could consider another alpha-blocker but I think that is low yield since he did not see improvement on Flomax. There could be a role for outlet surgery although it does not seem strong since he did not have obstructive issues and the empties well (he also appears to be bothered by the idea of retrograde ejaculation). He would like to try mirabegron and work on Kegel exercises. Instructions provided. He will see us back in 4 months to reassess.

## 2023-12-05 ENCOUNTER — RA CDI HCC (OUTPATIENT)
Dept: OTHER | Facility: HOSPITAL | Age: 83
End: 2023-12-05

## 2023-12-12 ENCOUNTER — OFFICE VISIT (OUTPATIENT)
Dept: FAMILY MEDICINE CLINIC | Facility: CLINIC | Age: 83
End: 2023-12-12
Payer: COMMERCIAL

## 2023-12-12 VITALS
OXYGEN SATURATION: 98 % | HEIGHT: 68 IN | WEIGHT: 150 LBS | TEMPERATURE: 97.9 F | SYSTOLIC BLOOD PRESSURE: 158 MMHG | HEART RATE: 75 BPM | DIASTOLIC BLOOD PRESSURE: 78 MMHG | BODY MASS INDEX: 22.73 KG/M2 | RESPIRATION RATE: 18 BRPM

## 2023-12-12 DIAGNOSIS — E78.5 HYPERLIPIDEMIA, UNSPECIFIED HYPERLIPIDEMIA TYPE: ICD-10-CM

## 2023-12-12 DIAGNOSIS — Z12.5 SCREENING FOR PROSTATE CANCER: ICD-10-CM

## 2023-12-12 DIAGNOSIS — Z13.6 SCREENING FOR CARDIOVASCULAR CONDITION: ICD-10-CM

## 2023-12-12 DIAGNOSIS — Z00.00 MEDICARE ANNUAL WELLNESS VISIT, SUBSEQUENT: Primary | ICD-10-CM

## 2023-12-12 DIAGNOSIS — I10 ESSENTIAL HYPERTENSION: ICD-10-CM

## 2023-12-12 DIAGNOSIS — Z79.899 HIGH RISK MEDICATION USE: ICD-10-CM

## 2023-12-12 DIAGNOSIS — N52.9 ERECTILE DYSFUNCTION, UNSPECIFIED ERECTILE DYSFUNCTION TYPE: ICD-10-CM

## 2023-12-12 PROCEDURE — G0439 PPPS, SUBSEQ VISIT: HCPCS | Performed by: FAMILY MEDICINE

## 2023-12-12 RX ORDER — LISINOPRIL 20 MG/1
20 TABLET ORAL DAILY
Qty: 90 TABLET | Refills: 1 | Status: SHIPPED | OUTPATIENT
Start: 2023-12-12

## 2023-12-12 RX ORDER — SILDENAFIL CITRATE 20 MG/1
TABLET ORAL
Qty: 50 TABLET | Refills: 3 | Status: SHIPPED | OUTPATIENT
Start: 2023-12-12

## 2023-12-12 NOTE — PATIENT INSTRUCTIONS
Medicare Preventive Visit Patient Instructions  Thank you for completing your Welcome to Medicare Visit or Medicare Annual Wellness Visit today. Your next wellness visit will be due in one year (12/12/2024). The screening/preventive services that you may require over the next 5-10 years are detailed below. Some tests may not apply to you based off risk factors and/or age. Screening tests ordered at today's visit but not completed yet may show as past due. Also, please note that scanned in results may not display below. Preventive Screenings:  Service Recommendations Previous Testing/Comments   Colorectal Cancer Screening  Colonoscopy    Fecal Occult Blood Test (FOBT)/Fecal Immunochemical Test (FIT)  Fecal DNA/Cologuard Test  Flexible Sigmoidoscopy Age: 43-73 years old   Colonoscopy: every 10 years (May be performed more frequently if at higher risk)  OR  FOBT/FIT: every 1 year  OR  Cologuard: every 3 years  OR  Sigmoidoscopy: every 5 years  Screening may be recommended earlier than age 39 if at higher risk for colorectal cancer. Also, an individualized decision between you and your healthcare provider will decide whether screening between the ages of 77-80 would be appropriate.  Colonoscopy: 12/21/2021  FOBT/FIT: Not on file  Cologuard: Not on file  Sigmoidoscopy: Not on file          Prostate Cancer Screening Individualized decision between patient and health care provider in men between ages of 53-66   Medicare will cover every 12 months beginning on the day after your 50th birthday PSA: 1.3 ng/mL           Hepatitis C Screening Once for adults born between 1945 and 1965  More frequently in patients at high risk for Hepatitis C Hep C Antibody: Not on file        Diabetes Screening 1-2 times per year if you're at risk for diabetes or have pre-diabetes Fasting glucose: 87 mg/dL (8/21/2023)  A1C: No results in last 5 years (No results in last 5 years)      Cholesterol Screening Once every 5 years if you don't have a lipid disorder. May order more often based on risk factors. Lipid panel: 08/21/2023         Other Preventive Screenings Covered by Medicare:  Abdominal Aortic Aneurysm (AAA) Screening: covered once if your at risk. You're considered to be at risk if you have a family history of AAA or a male between the age of 70-76 who smoking at least 100 cigarettes in your lifetime. Lung Cancer Screening: covers low dose CT scan once per year if you meet all of the following conditions: (1) Age 48-67; (2) No signs or symptoms of lung cancer; (3) Current smoker or have quit smoking within the last 15 years; (4) You have a tobacco smoking history of at least 20 pack years (packs per day x number of years you smoked); (5) You get a written order from a healthcare provider. Glaucoma Screening: covered annually if you're considered high risk: (1) You have diabetes OR (2) Family history of glaucoma OR (3)  aged 48 and older OR (3)  American aged 72 and older  Osteoporosis Screening: covered every 2 years if you meet one of the following conditions: (1) Have a vertebral abnormality; (2) On glucocorticoid therapy for more than 3 months; (3) Have primary hyperparathyroidism; (4) On osteoporosis medications and need to assess response to drug therapy. HIV Screening: covered annually if you're between the age of 14-79. Also covered annually if you are younger than 13 and older than 72 with risk factors for HIV infection. For pregnant patients, it is covered up to 3 times per pregnancy.     Immunizations:  Immunization Recommendations   Influenza Vaccine Annual influenza vaccination during flu season is recommended for all persons aged >= 6 months who do not have contraindications   Pneumococcal Vaccine   * Pneumococcal conjugate vaccine = PCV13 (Prevnar 13), PCV15 (Vaxneuvance), PCV20 (Prevnar 20)  * Pneumococcal polysaccharide vaccine = PPSV23 (Pneumovax) Adults 70-40 yo with certain risk factors or if 69+ yo  If never received any pneumonia vaccine: recommend Prevnar 21 (PCV20)  Give PCV20 if previously received 1 dose of PCV13 or PPSV23   Hepatitis B Vaccine 3 dose series if at intermediate or high risk (ex: diabetes, end stage renal disease, liver disease)   Respiratory syncytial virus (RSV) Vaccine - COVERED BY MEDICARE PART D  * RSVPreF3 (Arexvy) CDC recommends that adults 61years of age and older may receive a single dose of RSV vaccine using shared clinical decision-making (SCDM)   Tetanus (Td) Vaccine - COST NOT COVERED BY MEDICARE PART B Following completion of primary series, a booster dose should be given every 10 years to maintain immunity against tetanus. Td may also be given as tetanus wound prophylaxis. Tdap Vaccine - COST NOT COVERED BY MEDICARE PART B Recommended at least once for all adults. For pregnant patients, recommended with each pregnancy. Shingles Vaccine (Shingrix) - COST NOT COVERED BY MEDICARE PART B  2 shot series recommended in those 19 years and older who have or will have weakened immune systems or those 50 years and older     Health Maintenance Due:      Topic Date Due   • Colorectal Cancer Screening  Discontinued     Immunizations Due:  There are no preventive care reminders to display for this patient. Advance Directives   What are advance directives? Advance directives are legal documents that state your wishes and plans for medical care. These plans are made ahead of time in case you lose your ability to make decisions for yourself. Advance directives can apply to any medical decision, such as the treatments you want, and if you want to donate organs. What are the types of advance directives? There are many types of advance directives, and each state has rules about how to use them. You may choose a combination of any of the following:  Living will: This is a written record of the treatment you want.  You can also choose which treatments you do not want, which to limit, and which to stop at a certain time. This includes surgery, medicine, IV fluid, and tube feedings. Durable power of  for [facility]): This is a written record that states who you want to make healthcare choices for you when you are unable to make them for yourself. This person, called a proxy, is usually a family member or a friend. You may choose more than 1 proxy. Do not resuscitate (DNR) order:  A DNR order is used in case your heart stops beating or you stop breathing. It is a request not to have certain forms of treatment, such as CPR. A DNR order may be included in other types of advance directives. Medical directive: This covers the care that you want if you are in a coma, near death, or unable to make decisions for yourself. You can list the treatments you want for each condition. Treatment may include pain medicine, surgery, blood transfusions, dialysis, IV or tube feedings, and a ventilator (breathing machine). Values history: This document has questions about your views, beliefs, and how you feel and think about life. This information can help others choose the care that you would choose. Why are advance directives important? An advance directive helps you control your care. Although spoken wishes may be used, it is better to have your wishes written down. Spoken wishes can be misunderstood, or not followed. Treatments may be given even if you do not want them. An advance directive may make it easier for your family to make difficult choices about your care. © Copyright [employer] 2018 Information is for End User's use only and may not be sold, redistributed or otherwise used for commercial purposes.  All illustrations and images included in CareNotes® are the copyrighted property of [employer] or [employer]

## 2023-12-12 NOTE — PROGRESS NOTES
Assessment and Plan:     Problem List Items Addressed This Visit          Other    Hyperlipidemia    Relevant Orders    Lipid panel     Other Visit Diagnoses       Medicare annual wellness visit, subsequent    -  Primary    Erectile dysfunction, unspecified erectile dysfunction type        Relevant Medications    sildenafil (REVATIO) 20 mg tablet    Screening for prostate cancer        Relevant Orders    UA (URINE) with reflex to Scope    PSA, Total Screen    Screening for cardiovascular condition        Relevant Orders    CBC and differential    Comprehensive metabolic panel    Lipid panel    UA (URINE) with reflex to Scope    High risk medication use        Relevant Orders    CBC and differential    Essential hypertension        Relevant Medications    lisinopril (ZESTRIL) 20 mg tablet          Medicare wellness visit completed  Will increase lisinopril to 20 mg  Labs ordered  Sildenafil refilled  Can follow-up in 1 year or sooner if needed            Depression Screening and Follow-up Plan: Patient was screened for depression during today's encounter. They screened negative with a PHQ-2 score of 0. Preventive health issues were discussed with patient, and age appropriate screening tests were ordered as noted in patient's After Visit Summary. Personalized health advice and appropriate referrals for health education or preventive services given if needed, as noted in patient's After Visit Summary. History of Present Illness:     Patient presents for a Medicare Wellness Visit    Patient presents today for Medicare wellness visit  He is doing well with no acute complaints he will be seen in [facility] where he goes to the winter and go snowmobiling       Patient Care Team:  Cornell Rosenberg DO as PCP - MD Angie Cook MD     Review of Systems:     Review of Systems   Constitutional: Negative. HENT: Negative. Eyes: Negative. Respiratory: Negative.      Cardiovascular: Negative. Gastrointestinal: Negative. Endocrine: Negative. Genitourinary: Negative. Musculoskeletal: Negative. Skin: Negative. Allergic/Immunologic: Negative. Neurological: Negative. Hematological: Negative. Psychiatric/Behavioral: Negative. All other systems reviewed and are negative.        Problem List:     Patient Active Problem List   Diagnosis    Allergic rhinitis    Dermatitis, eczematoid    Generalized osteoarthritis    Hypertension    Hyperlipidemia    Numbness and tingling in left hand    Trigger finger, left little finger    Carpal tunnel syndrome of left wrist    Carpal tunnel syndrome on left    Finger mass, left    Biceps tendonitis on right    Subdural hygroma    TMJ arthralgia    Primary osteoarthritis of left knee    Rupture of right long head biceps tendon    BPH associated with nocturia      Past Medical and Surgical History:     Past Medical History:   Diagnosis Date    Arthritis     Chronic sinusitis     Last Assessed:3/26/14    Colon polyp     Degeneration of cervical intervertebral disc     Last Assessed:3/26/14    Derangement of unspecified medial meniscus due to old tear or injury, left knee     Last Assessed:4/10/14    Erythema migrans (Lyme disease)     Last Assessed:5/22/13    Eustachian tube dysfunction     Last Assessed:9/29/15    HL (hearing loss)     Hyperlipidemia     Hypertension     Osteoarthritis of wrist     Last Assessed:5/13/15    Primary osteoarthritis of left knee     Last Assessed:7/16/14    Spondylosis of cervical region without myelopathy or radiculopathy     Last Assessed:3/31/14    TMJ syndrome     Last Assessed:12/30/14    Trigger finger of left hand     Last Assessed:5/13/15    Trigger finger of right hand     Little finger, middle finger     Past Surgical History:   Procedure Laterality Date    CATARACT EXTRACTION      COLONOSCOPY      KNEE SURGERY      NEUROPLASTY / TRANSPOSITION MEDIAN NERVE AT CARPAL TUNNEL      GA NEUROPLASTY &/TRANSPOS MEDIAN NRV CARPAL TUNNE Left 4/25/2019    Procedure: RELEASE CARPAL TUNNEL OPEN;  Surgeon: Lise Collado MD;  Location: [facility];  Service: Orthopedics    NM TENDON SHEATH INCISION Left 4/25/2019    Procedure: RELEASE TRIGGER FINGER - LEFT SMALL;  Surgeon: Lise Collado MD;  Location: [facility];  Service: Orthopedics    SHOULDER ARTHROSCOPY Bilateral     SHOULDER SURGERY      TONSILLECTOMY      TONSILLECTOMY        Family History:     Family History   Problem Relation Age of Onset    Heart disease Mother         Coronary heart disease    Dementia Mother     Heart disease Father         Coronary heart disease    Lung cancer Paternal Grandmother       Social History:     Social History     Socioeconomic History    Marital status: /Civil Union     Spouse name: None    Number of children: None    Years of education: None    Highest education level: None   Occupational History    Occupation: Retired   Tobacco Use    Smoking status: Former     Types: Cigars     Passive exposure: Past    Smokeless tobacco: Never   Vaping Use    Vaping Use: Never used   Substance and Sexual Activity    Alcohol use: Yes     Alcohol/week: 14.0 standard drinks of alcohol     Types: 14 Glasses of wine per week     Comment: Social drinker/Denies alcohol causing problems    Drug use: No    Sexual activity: Yes     Partners: Female   Other Topics Concern    None   Social History Narrative    Exercises regularly     Social Determinants of Health     Financial Resource Strain: Low Risk  (12/11/2023)    Overall Financial Resource Strain (CARDIA)     Difficulty of Paying Living Expenses: Not hard at all   Food Insecurity: No Food Insecurity (11/14/2019)    Hunger Vital Sign     Worried About Running Out of Food in the Last Year: Never true     Ran Out of Food in the Last Year: Never true   Transportation Needs: No Transportation Needs (12/11/2023)    PRAPARE - Transportation     Lack of Transportation (Medical):  No Lack of Transportation (Non-Medical): No   Physical Activity: Unknown (11/14/2019)    Exercise Vital Sign     Days of Exercise per Week: Patient refused     Minutes of Exercise per Session: Patient refused   Stress: No Stress Concern Present (11/14/2019)    [facility]     Feeling of Stress : Not at all   Social Connections: [facility] (11/14/2019)    Social Connection and Isolation Panel [NHANES]     Frequency of Communication with Friends and Family: Three times a week     Frequency of Social Gatherings with Friends and Family: Once a week     Attends L[employer] day Services: More than 4 times per year     Active Member of [employer] or Organizations: Yes     Attends Club or Organization Meetings: Never     Marital Status:    Intimate Partner Violence: Not At Risk (12/12/2023)    Humiliation, Afraid, Rape, and Kick questionnaire     Fear of Current or Ex-Partner: No     Emotionally Abused: No     Physically Abused: No     Sexually Abused: No   Housing Stability: Not on file      Medications and Allergies:     Current Outpatient Medications   Medication Sig Dispense Refill    aspirin 81 MG tablet Take 1 tablet by mouth daily       ezetimibe (ZETIA) 10 mg tablet Take 1 tablet (10 mg total) by mouth daily 90 tablet 1    lisinopril (ZESTRIL) 20 mg tablet Take 1 tablet (20 mg total) by mouth daily 90 tablet 1    Mirabegron ER 50 MG TB24 Take 1 tablet (50 mg total) by mouth daily 90 tablet 6    sildenafil (REVATIO) 20 mg tablet Take 3-5 tablets daily as directed 50 tablet 3    diclofenac sodium (VOLTAREN) 1 % Apply 2 g topically 4 (four) times a day (Patient not taking: Reported on 10/26/2023) 1 Tube 3     No current facility-administered medications for this visit.      Allergies   Allergen Reactions    Azithromycin Other (See Comments)     Cannot recall allergy or reaction    Meperidine Other (See Comments)     Cannot recall allergy or reaction Pseudoephedrine Other (See Comments)     Cannot recall allergy or reaction      Immunizations:     Immunization History   Administered Date(s) Administered    COVID-19 PFIZER VACCINE 0.3 ML IM 01/19/2021, 02/07/2021, 08/16/2021    COVID-19 Pfizer Vac BIVALENT Nikita-sucrose 12 Yr+ IM 09/14/2022, 05/20/2023    COVID-19 Pfizer mRNA vacc PF nikita-sucrose 12 yr and older ([address]) 10/16/2023    COVID-19 Pfizer vac (Nikita-sucrose, gray cap) 12 yr+ IM 03/28/2022    INFLUENZA 08/16/2021, 09/14/2022, 10/16/2023    Influenza Split High Dose Preservative Free IM 09/20/2012, 10/07/2013, 09/30/2014, 09/22/2015, 09/07/2016, 10/04/2017    Influenza, high dose seasonal 0.7 mL 09/25/2018, 10/15/2019, 09/29/2020    Pneumococcal Conjugate 13-Valent 11/21/2016    Pneumococcal Polysaccharide PPV23 09/19/2011, 05/17/2016    TD (adult) Preservative Free 05/20/2015, 12/20/2016    Td (adult), adsorbed 05/25/2005    Tdap 12/30/2019    Zoster 12/03/2017    Zoster Vaccine Recombinant 04/08/2019, 06/10/2019      Health Maintenance:         Topic Date Due    Colorectal Cancer Screening  Discontinued     There are no preventive care reminders to display for this patient. Medicare Screening Tests and Risk Assessments:     Raúl Cha is here for his Subsequent Wellness visit. Last Medicare Wellness visit information reviewed, patient interviewed and updates made to the record today. Health Risk Assessment:   Patient rates overall health as very good. Patient feels that their physical health rating is same. Patient is very satisfied with their life. Eyesight was rated as same. Hearing was rated as same. Patient feels that their emotional and mental health rating is same. Patients states they are sometimes angry. Patient states they are sometimes unusually tired/fatigued. Pain experienced in the last 7 days has been none. Patient states that he has experienced no weight loss or gain in last 6 months.      Depression Screening:   PHQ-2 Score: 0      Fall Risk Screening: In the past year, patient has experienced: no history of falling in past year      Home Safety:  Patient does not have trouble with stairs inside or outside of their home. Patient has working smoke alarms and has working carbon monoxide detector. Home safety hazards include: none. was unable to complete preferred language correctly    Nutrition:   Current diet is Regular. Medications:   Patient is currently taking over-the-counter supplements. OTC medications include: see medication list. Patient is able to manage medications. Activities of Daily Living (ADLs)/Instrumental Activities of Daily Living (IADLs):   Walk and transfer into and out of bed and chair?: Yes  Dress and groom yourself?: Yes    Bathe or shower yourself?: Yes    Feed yourself? Yes  Do your laundry/housekeeping?: Yes  Manage your money, pay your bills and track your expenses?: Yes  Make your own meals?: Yes    Do your own shopping?: Yes    Previous Hospitalizations:   Any hospitalizations or ED visits within the last 12 months?: No      Advance Care Planning:   Living will: Yes    Durable POA for healthcare:  Yes    Advanced directive: Yes    Advanced directive counseling given: Yes    End of Life Decisions reviewed with patient: Yes    Provider agrees with end of life decisions: Yes      Cognitive Screening:   Provider or family/friend/caregiver concerned regarding cognition?: No    PREVENTIVE SCREENINGS      Cardiovascular Screening:    General: Screening Not Indicated, History Lipid Disorder and Screening Current      Diabetes Screening:     General: Screening Current      Colorectal Cancer Screening:     General: Screening Current      Prostate Cancer Screening:    General: Screening Current      Osteoporosis Screening:    General: Screening Not Indicated      Abdominal Aortic Aneurysm (AAA) Screening:    Risk factors include: tobacco use        General: Screening Not Indicated      Lung Cancer Screening: General: Screening Not Indicated      Hepatitis C Screening:    General: Screening Not Indicated    Screening, Brief Intervention, and Referral to Treatment (SBIRT)    Screening  Typical number of drinks in a day: 2  Typical number of drinks in a week: 14  Interpretation: Low risk drinking behavior. AUDIT-C Screenin) How often did you have a drink containing alcohol in the past year? 4 or more times a week  2) How many drinks did you have on a typical day when you were drinking in the past year? 1 to 2  3) How often did you have 6 or more drinks on one occasion in the past year? never    AUDIT-C Score: 4  Interpretation: Score 4-12 (male): POSITIVE screen for alcohol misuse    AUDIT Screenin) How often during the last year have you found that you were not able to stop drinking once you had started? 0 - never  5) How often during the last year have you failed to do what was normally expected from you because of drinking? 0 - never  6) How often during the last year have you needed a first drink in the morning to get yourself going after a heavy drinking session?  0 - never  7) How often during the last year have you had a feeling of guilt or remorse after drinking? 0 - never  8) How often during the last year have you been unable to remember what happened the night before because you had been drinking? 0 - never  9) Have you or someone else been injured as a result of your drinking? 0 - no  10) Has a relative or friend or a doctor or another health worker been concerned about your drinking or suggested you cut down? 0 - no    AUDIT Score: 4  Interpretation: Low risk alcohol consumption    Single Item Drug Screening:  How often have you used an illegal drug (including marijuana) or a prescription medication for non-medical reasons in the past year? never    Single Item Drug Screen Score: 0  Interpretation: Negative screen for possible drug use disorder    Brief Intervention  Alcohol & drug use screenings were reviewed. No concerns regarding substance use disorder identified. Other Counseling Topics:   Car/seat belt/driving safety, skin self-exam, sunscreen and regular weightbearing exercise and calcium and vitamin D intake. No results found. Physical Exam:     /78 (BP Location: Left arm, Patient Position: Sitting, Cuff Size: Standard)   Pulse 75   Temp 97.9 °F (36.6 °C) (Tympanic)   Resp 18   Ht 5' 8" (1.727 m)   Wt 68 kg (150 lb)   SpO2 98%   BMI 22.81 kg/m²     Physical Exam  Vitals and nursing note reviewed. Constitutional:       Appearance: Normal appearance. He is well-developed. HENT:      Head: Normocephalic. Right Ear: External ear normal.      Left Ear: External ear normal.      Nose: Nose normal.   Eyes:      Conjunctiva/sclera: Conjunctivae normal.      Pupils: Pupils are equal, round, and reactive to light. Cardiovascular:      Rate and Rhythm: Normal rate and regular rhythm. Heart sounds: Normal heart sounds. Pulmonary:      Effort: Pulmonary effort is normal.      Breath sounds: Normal breath sounds. Abdominal:      General: Bowel sounds are normal.      Palpations: Abdomen is soft. Musculoskeletal:         General: Normal range of motion. Cervical back: Normal range of motion and neck supple. Skin:     General: Skin is warm and dry. Neurological:      General: No focal deficit present. Mental Status: He is alert and oriented to person, place, and time. Psychiatric:         Behavior: Behavior normal.         Thought Content:  Thought content normal.         Judgment: Judgment normal.          Raymon Marte,

## 2023-12-13 ENCOUNTER — HOSPITAL ENCOUNTER (OUTPATIENT)
Dept: CT IMAGING | Facility: HOSPITAL | Age: 83
Discharge: HOME | End: 2023-12-13
Payer: COMMERCIAL

## 2023-12-13 ENCOUNTER — APPOINTMENT (OUTPATIENT)
Dept: LAB | Facility: CLINIC | Age: 83
End: 2023-12-13
Payer: COMMERCIAL

## 2023-12-13 DIAGNOSIS — Z12.5 SCREENING FOR PROSTATE CANCER: ICD-10-CM

## 2023-12-13 DIAGNOSIS — Z13.6 SCREENING FOR CARDIOVASCULAR CONDITION: ICD-10-CM

## 2023-12-13 DIAGNOSIS — Z79.899 HIGH RISK MEDICATION USE: ICD-10-CM

## 2023-12-13 DIAGNOSIS — E78.5 HYPERLIPIDEMIA, UNSPECIFIED HYPERLIPIDEMIA TYPE: ICD-10-CM

## 2023-12-13 LAB
ALBUMIN SERPL BCP-MCNC: 4.2 G/DL (ref 3.5–5)
ALP SERPL-CCNC: 46 U/L (ref 34–104)
ALT SERPL W P-5'-P-CCNC: 27 U/L (ref 7–52)
ANION GAP SERPL CALCULATED.3IONS-SCNC: 6 MMOL/L
AST SERPL W P-5'-P-CCNC: 35 U/L (ref 13–39)
BASOPHILS # BLD AUTO: 0.04 THOUSANDS/ÂΜL (ref 0–0.1)
BASOPHILS NFR BLD AUTO: 1 % (ref 0–1)
BILIRUB SERPL-MCNC: 0.6 MG/DL (ref 0.2–1)
BILIRUB UR QL STRIP: NEGATIVE
BUN SERPL-MCNC: 12 MG/DL (ref 5–25)
CALCIUM SERPL-MCNC: 9.3 MG/DL (ref 8.4–10.2)
CHLORIDE SERPL-SCNC: 104 MMOL/L (ref 96–108)
CHOLEST SERPL-MCNC: 217 MG/DL
CLARITY UR: CLEAR
CO2 SERPL-SCNC: 30 MMOL/L (ref 21–32)
COLOR UR: COLORLESS
CREAT SERPL-MCNC: 0.74 MG/DL (ref 0.6–1.3)
EOSINOPHIL # BLD AUTO: 0.27 THOUSAND/ÂΜL (ref 0–0.61)
EOSINOPHIL NFR BLD AUTO: 4 % (ref 0–6)
ERYTHROCYTE [DISTWIDTH] IN BLOOD BY AUTOMATED COUNT: 12.8 % (ref 11.6–15.1)
GFR SERPL CREATININE-BSD FRML MDRD: 86 ML/MIN/1.73SQ M
GLUCOSE P FAST SERPL-MCNC: 93 MG/DL (ref 65–99)
GLUCOSE UR STRIP-MCNC: NEGATIVE MG/DL
HCT VFR BLD AUTO: 47.1 % (ref 36.5–49.3)
HDLC SERPL-MCNC: 60 MG/DL
HGB BLD-MCNC: 15.1 G/DL (ref 12–17)
HGB UR QL STRIP.AUTO: NEGATIVE
IMM GRANULOCYTES # BLD AUTO: 0.03 THOUSAND/UL (ref 0–0.2)
IMM GRANULOCYTES NFR BLD AUTO: 0 % (ref 0–2)
KETONES UR STRIP-MCNC: NEGATIVE MG/DL
LDLC SERPL CALC-MCNC: 139 MG/DL (ref 0–100)
LEUKOCYTE ESTERASE UR QL STRIP: NEGATIVE
LYMPHOCYTES # BLD AUTO: 1.81 THOUSANDS/ÂΜL (ref 0.6–4.47)
LYMPHOCYTES NFR BLD AUTO: 26 % (ref 14–44)
MCH RBC QN AUTO: 33 PG (ref 26.8–34.3)
MCHC RBC AUTO-ENTMCNC: 32.1 G/DL (ref 31.4–37.4)
MCV RBC AUTO: 103 FL (ref 82–98)
MONOCYTES # BLD AUTO: 0.74 THOUSAND/ÂΜL (ref 0.17–1.22)
MONOCYTES NFR BLD AUTO: 11 % (ref 4–12)
NEUTROPHILS # BLD AUTO: 4.08 THOUSANDS/ÂΜL (ref 1.85–7.62)
NEUTS SEG NFR BLD AUTO: 58 % (ref 43–75)
NITRITE UR QL STRIP: NEGATIVE
NONHDLC SERPL-MCNC: 157 MG/DL
NRBC BLD AUTO-RTO: 0 /100 WBCS
PH UR STRIP.AUTO: 7 [PH]
PLATELET # BLD AUTO: 225 THOUSANDS/UL (ref 149–390)
PMV BLD AUTO: 10.7 FL (ref 8.9–12.7)
POTASSIUM SERPL-SCNC: 4.7 MMOL/L (ref 3.5–5.3)
PROT SERPL-MCNC: 6.5 G/DL (ref 6.4–8.4)
PROT UR STRIP-MCNC: NEGATIVE MG/DL
PSA SERPL-MCNC: 1.54 NG/ML (ref 0–4)
RBC # BLD AUTO: 4.58 MILLION/UL (ref 3.88–5.62)
SODIUM SERPL-SCNC: 140 MMOL/L (ref 135–147)
SP GR UR STRIP.AUTO: 1 (ref 1–1.03)
TRIGL SERPL-MCNC: 88 MG/DL
UROBILINOGEN UR STRIP-ACNC: <2 MG/DL
WBC # BLD AUTO: 6.97 THOUSAND/UL (ref 4.31–10.16)

## 2023-12-13 PROCEDURE — G0103 PSA SCREENING: HCPCS

## 2023-12-13 PROCEDURE — 80061 LIPID PANEL: CPT

## 2023-12-13 PROCEDURE — 81003 URINALYSIS AUTO W/O SCOPE: CPT | Performed by: FAMILY MEDICINE

## 2023-12-13 PROCEDURE — 80053 COMPREHEN METABOLIC PANEL: CPT

## 2023-12-13 PROCEDURE — 85025 COMPLETE CBC W/AUTO DIFF WBC: CPT

## 2023-12-13 PROCEDURE — 36415 COLL VENOUS BLD VENIPUNCTURE: CPT

## 2023-12-13 PROCEDURE — 75571 CT HRT W/O DYE W/CA TEST: CPT

## 2023-12-13 PROCEDURE — G1004 CDSM NDSC: HCPCS

## 2023-12-14 ENCOUNTER — TELEPHONE (OUTPATIENT)
Dept: FAMILY MEDICINE CLINIC | Facility: CLINIC | Age: 83
End: 2023-12-14

## 2023-12-14 NOTE — TELEPHONE ENCOUNTER
Patient got his lipid panel done again yesterday.  He said his cholesterol is still up on this new med and wanted to know what he should do. He said you told him there are many different med options so he doesn't have to do the Lipitor again. He also did his coronary calcium score test but I told him those results aren't in yet. Please advise if you want to change patients meds he uses [facility] in [place].

## 2023-12-20 DIAGNOSIS — E78.5 HYPERLIPIDEMIA, UNSPECIFIED HYPERLIPIDEMIA TYPE: Primary | ICD-10-CM

## 2023-12-20 RX ORDER — ROSUVASTATIN CALCIUM 5 MG/1
5 TABLET, COATED ORAL DAILY
Qty: 90 TABLET | Refills: 1 | Status: SHIPPED | OUTPATIENT
Start: 2023-12-20

## 2024-02-02 DIAGNOSIS — M26.622 ARTHRALGIA OF LEFT TEMPOROMANDIBULAR JOINT: Primary | ICD-10-CM

## 2024-02-02 RX ORDER — PREDNISONE 10 MG/1
TABLET ORAL
Qty: 21 TABLET | Refills: 0 | Status: SHIPPED | OUTPATIENT
Start: 2024-02-02

## 2024-02-21 DIAGNOSIS — E78.5 HYPERLIPIDEMIA, UNSPECIFIED HYPERLIPIDEMIA TYPE: ICD-10-CM

## 2024-02-21 RX ORDER — EZETIMIBE 10 MG/1
10 TABLET ORAL DAILY
Qty: 90 TABLET | Refills: 1 | Status: SHIPPED | OUTPATIENT
Start: 2024-02-21

## 2024-03-11 DIAGNOSIS — Z79.899 HIGH RISK MEDICATION USE: ICD-10-CM

## 2024-03-11 DIAGNOSIS — E78.5 HYPERLIPIDEMIA, UNSPECIFIED HYPERLIPIDEMIA TYPE: Primary | ICD-10-CM

## 2024-03-12 ENCOUNTER — APPOINTMENT (OUTPATIENT)
Dept: LAB | Facility: CLINIC | Age: 84
End: 2024-03-12
Payer: COMMERCIAL

## 2024-03-12 DIAGNOSIS — E78.5 HYPERLIPIDEMIA, UNSPECIFIED HYPERLIPIDEMIA TYPE: ICD-10-CM

## 2024-03-12 DIAGNOSIS — Z79.899 HIGH RISK MEDICATION USE: ICD-10-CM

## 2024-03-12 LAB
ALBUMIN SERPL BCP-MCNC: 4.3 G/DL (ref 3.5–5)
ALP SERPL-CCNC: 41 U/L (ref 34–104)
ALT SERPL W P-5'-P-CCNC: 24 U/L (ref 7–52)
ANION GAP SERPL CALCULATED.3IONS-SCNC: 7 MMOL/L (ref 4–13)
AST SERPL W P-5'-P-CCNC: 29 U/L (ref 13–39)
BILIRUB SERPL-MCNC: 0.55 MG/DL (ref 0.2–1)
BUN SERPL-MCNC: 15 MG/DL (ref 5–25)
CALCIUM SERPL-MCNC: 9.4 MG/DL (ref 8.4–10.2)
CHLORIDE SERPL-SCNC: 105 MMOL/L (ref 96–108)
CHOLEST SERPL-MCNC: 166 MG/DL
CO2 SERPL-SCNC: 29 MMOL/L (ref 21–32)
CREAT SERPL-MCNC: 0.85 MG/DL (ref 0.6–1.3)
GFR SERPL CREATININE-BSD FRML MDRD: 81 ML/MIN/1.73SQ M
GLUCOSE P FAST SERPL-MCNC: 92 MG/DL (ref 65–99)
HDLC SERPL-MCNC: 67 MG/DL
LDLC SERPL CALC-MCNC: 80 MG/DL (ref 0–100)
NONHDLC SERPL-MCNC: 99 MG/DL
POTASSIUM SERPL-SCNC: 4.8 MMOL/L (ref 3.5–5.3)
PROT SERPL-MCNC: 6.7 G/DL (ref 6.4–8.4)
SODIUM SERPL-SCNC: 141 MMOL/L (ref 135–147)
TRIGL SERPL-MCNC: 97 MG/DL

## 2024-03-12 PROCEDURE — 80053 COMPREHEN METABOLIC PANEL: CPT

## 2024-03-12 PROCEDURE — 36415 COLL VENOUS BLD VENIPUNCTURE: CPT

## 2024-03-12 PROCEDURE — 80061 LIPID PANEL: CPT

## 2024-05-07 ENCOUNTER — OFFICE VISIT (OUTPATIENT)
Dept: UROLOGY | Facility: AMBULATORY SURGERY CENTER | Age: 84
End: 2024-05-07
Payer: MEDICARE

## 2024-05-07 VITALS
HEART RATE: 76 BPM | BODY MASS INDEX: 22.81 KG/M2 | OXYGEN SATURATION: 97 % | HEIGHT: 68 IN | DIASTOLIC BLOOD PRESSURE: 74 MMHG | SYSTOLIC BLOOD PRESSURE: 136 MMHG

## 2024-05-07 DIAGNOSIS — R35.1 BPH ASSOCIATED WITH NOCTURIA: Primary | ICD-10-CM

## 2024-05-07 DIAGNOSIS — N40.1 BPH ASSOCIATED WITH NOCTURIA: Primary | ICD-10-CM

## 2024-05-07 LAB
POST-VOID RESIDUAL VOLUME, ML POC: 0 ML
SL AMB  POCT GLUCOSE, UA: NORMAL
SL AMB LEUKOCYTE ESTERASE,UA: NORMAL
SL AMB POCT BILIRUBIN,UA: NORMAL
SL AMB POCT BLOOD,UA: NORMAL
SL AMB POCT CLARITY,UA: CLEAR
SL AMB POCT COLOR,UA: YELLOW
SL AMB POCT KETONES,UA: NORMAL
SL AMB POCT NITRITE,UA: NORMAL
SL AMB POCT PH,UA: 5
SL AMB POCT SPECIFIC GRAVITY,UA: 1.01
SL AMB POCT URINE PROTEIN: NORMAL
SL AMB POCT UROBILINOGEN: 1

## 2024-05-07 PROCEDURE — 99213 OFFICE O/P EST LOW 20 MIN: CPT

## 2024-05-07 PROCEDURE — 81002 URINALYSIS NONAUTO W/O SCOPE: CPT

## 2024-05-07 PROCEDURE — 51798 US URINE CAPACITY MEASURE: CPT

## 2024-05-07 RX ORDER — TROSPIUM CHLORIDE 20 MG/1
20 TABLET, FILM COATED ORAL
Qty: 60 TABLET | Refills: 1 | Status: SHIPPED | OUTPATIENT
Start: 2024-05-07

## 2024-05-07 NOTE — PROGRESS NOTES
"Office Visit- Urology  Patrick Frey 1940 MRN: [id number]      Assessment/Discussion/Plan    83 y.o. male managed by     BPH with lower urinary tract symptoms  -Predominant symptom is nocturia  -No improvement with Flomax.  Patient was initiated on Myrbetriq 50 mg in October 2023.  She was off of it due to concern that his blood pressure was elevated but was ultimately found that his blood pressure was elevated due to inaccurate blood pressure cuff at home.  He reinitiated and has been on it for the past few months but has not had any benefit in his nocturia  -Nocturia 3-5 times a night  -Discussed nonneurologic etiologies of nocturia including undiagnosed sleep apnea, lower extremity swelling, increased salt intake.  Patient's  wife does endorse \" different breathing pattern for patient at night although no gasping or significant snoring\".  Patient denies classic symptoms of obstructive sleep apnea including daytime fatigue, waking up with morning headache ETC  -Patient does have a high amount of salt in his diet  -Patient routinely drinks 1.5 glasses of wine at night.  Advised patient on alcohol/bladder irritant avoidance before bed to prevent nocturia  -Offered further attempts at pharmacotherapy for symptom alleviation.  Discussed Cialis 5 mg versus trospium 20 mg.  At this point in time patient like to trial trospium.  Discussed administration and possible side effects  -Follow-up in 3 months for medication check/PVR      Chief Complaint:   Patrick is a 83 y.o. male presenting to the office for a follow up visit regarding BPH with lower urinary tract symptoms        Subjective    Patient is an 83-year-old male with history of BPH with nocturia who presents to the office today for follow-up.  He was last in the office in October 2023.  His most bothersome urinary tract symptom by far is nocturia and he states that he is waking up 3-5 times a night to urinate.  He largely denies daytime urinary symptoms or " "other bothersome urinary tract symptoms at this point in time.  No dysuria, gross materia, flank pain.  He previously tried Flomax 0.4 mg but did not react well to it and did not find it helpful so he was initiated on Myrbetriq which she has not found to have any benefit for his degree of nocturia.  He does not have any history of obstructive sleep apnea.  He is accompanied today in the office by his spouse who does state that he has a \"different breathing pattern at night\" but no significant snoring or gasping.  Patient denies classic symptoms such as daytime fatigue or waking up with headache for undiagnosed obstructive sleep apnea.  Patient denies any lower extremity swelling.  He does eat a diet high in salt.  He also does routinely consume 1-2 glasses of wine at night.      ROS:   Review of Systems   Constitutional: Negative.  Negative for chills, fatigue and fever.   HENT: Negative.     Respiratory:  Negative for shortness of breath.    Cardiovascular:  Negative for chest pain.   Gastrointestinal: Negative.  Negative for abdominal pain.   Endocrine: Negative.    Musculoskeletal: Negative.    Skin: Negative.    Neurological: Negative.  Negative for dizziness and light-headedness.   Hematological: Negative.    Psychiatric/Behavioral: Negative.           Past Medical History  Past Medical History:   Diagnosis Date    Arthritis     Chronic sinusitis     Last Assessed:3/26/14    Colon polyp     Degeneration of cervical intervertebral disc     Last Assessed:3/26/14    Derangement of unspecified medial meniscus due to old tear or injury, left knee     Last Assessed:4/10/14    Erythema migrans (Lyme disease)     Last Assessed:5/22/13    Eustachian tube dysfunction     Last Assessed:9/29/15    HL (hearing loss)     Hyperlipidemia     Hypertension     Osteoarthritis of wrist     Last Assessed:5/13/15    Primary osteoarthritis of left knee     Last Assessed:7/16/14    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     Last Assessed:3/31/14    TMJ syndrome     Last Assessed:12/30/14    Trigger finger of left hand     Last Assessed:5/13/15    Trigger finger of right hand     Little finger, middle finger       Past Surgical History  Past Surgical History:   Procedure Laterality Date    CATARACT EXTRACTION      COLONOSCOPY      KNEE SURGERY      NEUROPLASTY / TRANSPOSITION MEDIAN NERVE AT CARPAL TUNNEL      [place] NEUROPLASTY &/TRANSPOS MEDIAN NRV CARPAL TUNNE Left 4/25/2019    Procedure: RELEASE CARPAL TUNNEL OPEN;  Surgeon: Carson Poole MD;  Location: [facility];  Service: Orthopedics    NY TENDON SHEATH INCISION Left 4/25/2019    Procedure: RELEASE TRIGGER FINGER - LEFT SMALL;  Surgeon: Carson Poole MD;  Location: [facility];  Service: Orthopedics    SHOULDER ARTHROSCOPY Bilateral     SHOULDER SURGERY      TONSILLECTOMY      TONSILLECTOMY         Past Family History  Family History   Problem Relation Age of Onset    Heart disease Mother         Coronary heart disease    Dementia Mother     Heart disease Father         Coronary heart disease    Lung cancer Paternal Grandmother        Past Social history  Social History     Socioeconomic History    Marital status: /Civil Union     Spouse name: Not on file    Number of children: Not on file    Years of education: Not on file    Highest education level: Not on file   Occupational History    Occupation: Retired   Tobacco Use    Smoking status: Former     Types: Cigars     Passive exposure: Past    Smokeless tobacco: Never   Vaping Use    Vaping status: Never Used   Substance and Sexual Activity    Alcohol use: Yes     Alcohol/week: 14.0 standard drinks of alcohol     Types: 14 Glasses of wine per week     Comment: Social drinker/Denies alcohol causing problems    Drug use: No    Sexual activity: Yes     Partners: Female   Other Topics Concern    Not on file   Social History Narrative    Exercises regularly     Social Determinants of Health     Financial  Resource Strain: Low Risk  (12/11/2023)    Overall Financial Resource Strain (CARDIA)     Difficulty of Paying Living Expenses: Not hard at all   Food Insecurity: No Food Insecurity (11/14/2019)    Hunger Vital Sign     Worried About Running Out of Food in the Last Year: Never true     Ran Out of Food in the Last Year: Never true   Transportation Needs: No Transportation Needs (12/11/2023)    PRAPARE - Transportation     Lack of Transportation (Medical): No     Lack of Transportation (Non-Medical): No   Physical Activity: Unknown (11/14/2019)    Exercise Vital Sign     Days of Exercise per Week: Patient declined     Minutes of Exercise per Session: Patient declined   Stress: No Stress Concern Present (11/14/2019)    [employer] [place] [employer] - Occupational Stress Questionnaire     Feeling of Stress : Not at all   Social Connections: Socially Integrated (11/14/2019)    Social Connection and Isolation Panel [NHANES]     Frequency of Communication with Friends and Family: Three times a week     Frequency of Social Gatherings with Friends and Family: Once a week     Attends Gn[employer] Services: More than 4 times per year     Active Member of Clubs or Organizations: Yes     Attends Club or Organization Meetings: Never     Marital Status:    Intimate Partner Violence: Not At Risk (12/12/2023)    Humiliation, Afraid, Rape, and Kick questionnaire     Fear of Current or Ex-Partner: No     Emotionally Abused: No     Physically Abused: No     Sexually Abused: No   Housing Stability: Not on file       Current Medications  Current Outpatient Medications   Medication Sig Dispense Refill    aspirin 81 MG tablet Take 1 tablet by mouth daily       ezetimibe (ZETIA) 10 mg tablet TAKE 1 TABLET BY MOUTH EVERY DAY 90 tablet 1    lisinopril (ZESTRIL) 20 mg tablet Take 1 tablet (20 mg total) by mouth daily 90 tablet 1    rosuvastatin (CRESTOR) 5 mg tablet Take 1 tablet (5 mg total) by mouth daily 90 tablet 1     "sildenafil (REVATIO) 20 mg tablet Take 3-5 tablets daily as directed 50 tablet 3    trospium chloride (SANCTURA) 20 mg tablet Take 1 tablet (20 mg total) by mouth daily at bedtime 60 tablet 1    diclofenac sodium (VOLTAREN) 1 % Apply 2 g topically 4 (four) times a day (Patient not taking: Reported on 10/26/2023) 1 Tube 3    predniSONE 10 mg tablet 6 tabs on Day 1,5 tabs on Day 2,4 tabs on Day 3,3 tabs on Day 4,2 tabs on  Day 5And 1 tab on Day 6 (Patient not taking: Reported on 5/7/2024) 21 tablet 0     No current facility-administered medications for this visit.       Allergies  Allergies   Allergen Reactions    Azithromycin Other (See Comments)     Cannot recall allergy or reaction    Meperidine Other (See Comments)     Cannot recall allergy or reaction    Pseudoephedrine Other (See Comments)     Cannot recall allergy or reaction       OBJECTIVE    Vitals   Vitals:    05/07/24 0826   BP: 136/74   BP Location: Left arm   Patient Position: Sitting   Cuff Size: Standard   Pulse: 76   SpO2: 97%   Height: 5' 8\" (1.727 m)       PVR:    Physical Exam  Constitutional:       General: He is not in acute distress.     Appearance: Normal appearance. He is normal weight. He is not ill-appearing or toxic-appearing.   HENT:      Head: Normocephalic and atraumatic.   Eyes:      Conjunctiva/sclera: Conjunctivae normal.   Cardiovascular:      Rate and Rhythm: Normal rate.   Pulmonary:      Effort: Pulmonary effort is normal. No respiratory distress.   Skin:     General: Skin is warm and dry.   Neurological:      General: No focal deficit present.      Mental Status: He is alert and oriented to person, place, and time.      Cranial Nerves: No cranial nerve deficit.   Psychiatric:         Mood and Affect: Mood normal.         Behavior: Behavior normal.         Thought Content: Thought content normal.          Labs:     Lab Results   Component Value Date    PSA 1.54 12/13/2023    PSA 1.3 12/05/2022    PSA 1.2 12/02/2021    PSA 1.2 " "12/01/2020     Lab Results   Component Value Date    CREATININE 0.85 03/12/2024      No results found for: \"HGBA1C\"  Lab Results   Component Value Date    GLUCOSE 116 12/30/2019    CALCIUM 9.4 03/12/2024     09/22/2015    K 4.8 03/12/2024    CO2 29 03/12/2024     03/12/2024    BUN 15 03/12/2024    CREATININE 0.85 03/12/2024       I have personally reviewed all pertinent lab results and reviewed with patient    Imaging       Taran Archer PA-C  Date: 5/7/2024 Time: 9:41 AM  [facility]    This note was written using fluency dictation software. Please excuse any resulting minor grammatical errors.      "

## 2024-05-07 NOTE — PATIENT INSTRUCTIONS
Trospium (Sanctura) Information     Used for symptoms of Overactive Bladder such as urinary frequency, urgency, and urge incontinence.    Extended Release:Take one 60 mg tablet once daily in the morning with a full glass of  water on an empty stomach at least 1 hour before a meal. Avoid consuming any alcohol within 2 hours of taking a dose of extended release Trospium    Immediate Release: Take 20 mg twice daily with water on an empty stomach at least one hour before meals. Space the two doses apart by 12 hours.     Over 75: Take one 20 mg tablet once a day with water on an empty stomach at least one hour before meals.     Please be aware that anticholinergics (the type of medicine that trospium (Sanctura) is may cause drowsiness, confusion, dizziness, hallucinations, and/or blurred vision which may impair physical or mental abilities. Please be cautious when performing tasks which require mental alertness (operating machinery or driving)    Immediately discontinue use and go to nearest emergency room if you experience swelling of face, tongue, or lips after taking a dose of trospium (Sanctura)    Potential side effects include but are not limited to:    Dry mouth   Constipation   Headache   Fast heart rate   Dry eyes   Blurry vision   Urinary tract infections   Inability to empty bladder completely     If you have any questions or concerns, please do not hesitate to reach out through [employer] or by calling the office.    Daily Cialis       Please take one 5 mg tablet by mouth daily.     Daily Cialis is approved for treatment of urinary symptoms caused by an enlarged prostate (BPH or Benign Prostatic Hyperplasia) as well as erectile dysfunction    May be administered with or without food.     Should be taken at the same time each day without regard to timing of sexual activity.     Nitrates (e.g. Nitroglycerin) used for chest pain/angina can not be taken within 48 hours of taking Cialis (Tadalafil) due to potentially  dangerous lowering of blood pressure.     Stop the medication go to the emergency room if you experience chest pain, an allergic reaction, sudden hearing loss, severe drop in blood pressure (dizziness/lightheadedness), erection lasting for more than 4 hours, or sudden loss of vision after administration of Cialis.     Side effects include but is not limited to:  Facial flushing (facial redness/sensation of the heat)  Upset stomach  Nausea   Headache  Back pain   Nasal congestion     If you have any questions or concerns please do not hesitate to reach out through [employer] or by calling the office

## 2024-05-16 ENCOUNTER — TELEPHONE (OUTPATIENT)
Age: 84
End: 2024-05-16

## 2024-05-16 DIAGNOSIS — R31.29 MICROSCOPIC HEMATURIA: Primary | ICD-10-CM

## 2024-05-16 NOTE — TELEPHONE ENCOUNTER
Pts wife Stefany called.  Pt had appt with the [facility] on 5/7/24.  Stefany sts that pt had a Urine Dip test and was informed that pt would receive a call within a day or 2 with results.  Stefany sts that a message was also sent through [employer] requesting a call back to discuss results, as well as a question the pt has regarding urobilinogen.  Please review.    Pt call back: [phone]

## 2024-05-17 ENCOUNTER — APPOINTMENT (OUTPATIENT)
Dept: LAB | Facility: CLINIC | Age: 84
End: 2024-05-17
Payer: COMMERCIAL

## 2024-05-17 DIAGNOSIS — R31.29 MICROSCOPIC HEMATURIA: ICD-10-CM

## 2024-05-17 LAB
BACTERIA UR QL AUTO: ABNORMAL /HPF
BILIRUB UR QL STRIP: NEGATIVE
CLARITY UR: CLEAR
COLOR UR: ABNORMAL
GLUCOSE UR STRIP-MCNC: NEGATIVE MG/DL
HGB UR QL STRIP.AUTO: NEGATIVE
KETONES UR STRIP-MCNC: NEGATIVE MG/DL
LEUKOCYTE ESTERASE UR QL STRIP: ABNORMAL
NITRITE UR QL STRIP: NEGATIVE
NON-SQ EPI CELLS URNS QL MICRO: ABNORMAL /HPF
PH UR STRIP.AUTO: 6 [PH]
PROT UR STRIP-MCNC: NEGATIVE MG/DL
RBC #/AREA URNS AUTO: ABNORMAL /HPF
SP GR UR STRIP.AUTO: 1.01 (ref 1–1.03)
UROBILINOGEN UR STRIP-ACNC: <2 MG/DL
WBC #/AREA URNS AUTO: ABNORMAL /HPF

## 2024-05-17 PROCEDURE — 87086 URINE CULTURE/COLONY COUNT: CPT

## 2024-05-17 PROCEDURE — 81001 URINALYSIS AUTO W/SCOPE: CPT

## 2024-05-17 NOTE — TELEPHONE ENCOUNTER
I reviewed his urine studies that he had done in the office and they were positive for blood and leukocytes.  It does not appear that it was ever sent off for any additional testing.  I am not sure how this happened.  We can plan to have them go to the lab for repeat testing if they wish to follow-up with the results. If she wishes to have repeat studies he can go to the lab for urine microscopy and urine culture.

## 2024-05-18 LAB — BACTERIA UR CULT: NORMAL

## 2024-05-20 NOTE — TELEPHONE ENCOUNTER
Can you please call Patrick and let him know that I reviewed his most recent urine studies.  They are unremarkable and do not require any additional workup.  He does not require any antibiotics for an underlying UTI.  And there is no blood noted in his samples as well.  If he has any other additional questions or concerns he can reach out to our office.

## 2024-05-29 ENCOUNTER — OFFICE VISIT (OUTPATIENT)
Dept: URGENT CARE | Facility: CLINIC | Age: 84
End: 2024-05-29
Payer: COMMERCIAL

## 2024-05-29 ENCOUNTER — APPOINTMENT (EMERGENCY)
Dept: RADIOLOGY | Facility: HOSPITAL | Age: 84
End: 2024-05-29
Payer: COMMERCIAL

## 2024-05-29 ENCOUNTER — HOSPITAL ENCOUNTER (EMERGENCY)
Facility: HOSPITAL | Age: 84
Discharge: HOME | End: 2024-05-29
Attending: EMERGENCY MEDICINE
Payer: COMMERCIAL

## 2024-05-29 VITALS
OXYGEN SATURATION: 99 % | RESPIRATION RATE: 18 BRPM | SYSTOLIC BLOOD PRESSURE: 160 MMHG | HEART RATE: 69 BPM | TEMPERATURE: 98.2 F | DIASTOLIC BLOOD PRESSURE: 74 MMHG

## 2024-05-29 VITALS
TEMPERATURE: 96.6 F | SYSTOLIC BLOOD PRESSURE: 162 MMHG | HEART RATE: 81 BPM | WEIGHT: 144 LBS | BODY MASS INDEX: 21.9 KG/M2 | RESPIRATION RATE: 16 BRPM | OXYGEN SATURATION: 98 % | DIASTOLIC BLOOD PRESSURE: 82 MMHG

## 2024-05-29 DIAGNOSIS — M25.421 ELBOW SWELLING, RIGHT: Primary | ICD-10-CM

## 2024-05-29 DIAGNOSIS — M25.421 ELBOW EFFUSION, RIGHT: Primary | ICD-10-CM

## 2024-05-29 LAB
ANION GAP SERPL CALCULATED.3IONS-SCNC: 6 MMOL/L (ref 4–13)
APPEARANCE FLD: ABNORMAL
BASOPHILS # BLD AUTO: 0.02 THOUSANDS/ÂΜL (ref 0–0.1)
BASOPHILS NFR BLD AUTO: 0 % (ref 0–1)
BUN SERPL-MCNC: 14 MG/DL (ref 5–25)
CALCIUM SERPL-MCNC: 10.1 MG/DL (ref 8.4–10.2)
CHLORIDE SERPL-SCNC: 102 MMOL/L (ref 96–108)
CO2 SERPL-SCNC: 30 MMOL/L (ref 21–32)
COLOR FLD: YELLOW
CREAT SERPL-MCNC: 0.75 MG/DL (ref 0.6–1.3)
CRP SERPL QL: 10.2 MG/L
CRYSTALS SNV QL MICRO: NORMAL
EOSINOPHIL # BLD AUTO: 0.04 THOUSAND/ÂΜL (ref 0–0.61)
EOSINOPHIL NFR BLD AUTO: 0 % (ref 0–6)
ERYTHROCYTE [DISTWIDTH] IN BLOOD BY AUTOMATED COUNT: 12.2 % (ref 11.6–15.1)
ERYTHROCYTE [SEDIMENTATION RATE] IN BLOOD: 9 MM/HOUR (ref 0–19)
GFR SERPL CREATININE-BSD FRML MDRD: 86 ML/MIN/1.73SQ M
GLUCOSE SERPL-MCNC: 104 MG/DL (ref 65–140)
GRAM STN SPEC: NORMAL
GRAM STN SPEC: NORMAL
HCT VFR BLD AUTO: 45.9 % (ref 36.5–49.3)
HGB BLD-MCNC: 14.8 G/DL (ref 12–17)
IMM GRANULOCYTES # BLD AUTO: 0.08 THOUSAND/UL (ref 0–0.2)
IMM GRANULOCYTES NFR BLD AUTO: 1 % (ref 0–2)
LYMPHOCYTES # BLD AUTO: 1.55 THOUSANDS/ÂΜL (ref 0.6–4.47)
LYMPHOCYTES # SNV MANUAL: 2 %
LYMPHOCYTES NFR BLD AUTO: 14 % (ref 14–44)
MCH RBC QN AUTO: 32.2 PG (ref 26.8–34.3)
MCHC RBC AUTO-ENTMCNC: 32.2 G/DL (ref 31.4–37.4)
MCV RBC AUTO: 100 FL (ref 82–98)
MONOCYTES # BLD AUTO: 1.13 THOUSAND/ÂΜL (ref 0.17–1.22)
MONOCYTES NFR BLD AUTO: 10 % (ref 4–12)
MONOCYTES NFR SNV MANUAL: 11 %
NEUTROPHILS # BLD AUTO: 8.08 THOUSANDS/ÂΜL (ref 1.85–7.62)
NEUTROPHILS NFR SNV MANUAL: 87 %
NEUTS SEG NFR BLD AUTO: 75 % (ref 43–75)
NRBC BLD AUTO-RTO: 0 /100 WBCS
PLATELET # BLD AUTO: 213 THOUSANDS/UL (ref 149–390)
PMV BLD AUTO: 9.8 FL (ref 8.9–12.7)
POTASSIUM SERPL-SCNC: 4.6 MMOL/L (ref 3.5–5.3)
RBC # BLD AUTO: 4.59 MILLION/UL (ref 3.88–5.62)
RBC # SNV MANUAL: 0 /UL (ref 0–10)
SITE: ABNORMAL
SODIUM SERPL-SCNC: 138 MMOL/L (ref 135–147)
TOTAL CELLS COUNTED SPEC: 100
WBC # BLD AUTO: 10.9 THOUSAND/UL (ref 4.31–10.16)
WBC # FLD MANUAL: ABNORMAL /UL (ref 0–200)

## 2024-05-29 PROCEDURE — 73080 X-RAY EXAM OF ELBOW: CPT

## 2024-05-29 PROCEDURE — 87205 SMEAR GRAM STAIN: CPT | Performed by: PHYSICIAN ASSISTANT

## 2024-05-29 PROCEDURE — 89060 EXAM SYNOVIAL FLUID CRYSTALS: CPT | Performed by: PHYSICIAN ASSISTANT

## 2024-05-29 PROCEDURE — 99244 OFF/OP CNSLTJ NEW/EST MOD 40: CPT | Performed by: PHYSICIAN ASSISTANT

## 2024-05-29 PROCEDURE — 20605 DRAIN/INJ JOINT/BURSA W/O US: CPT | Performed by: PHYSICIAN ASSISTANT

## 2024-05-29 PROCEDURE — 86140 C-REACTIVE PROTEIN: CPT | Performed by: EMERGENCY MEDICINE

## 2024-05-29 PROCEDURE — 89051 BODY FLUID CELL COUNT: CPT | Performed by: PHYSICIAN ASSISTANT

## 2024-05-29 PROCEDURE — 99283 EMERGENCY DEPT VISIT LOW MDM: CPT

## 2024-05-29 PROCEDURE — 99213 OFFICE O/P EST LOW 20 MIN: CPT | Performed by: PHYSICIAN ASSISTANT

## 2024-05-29 PROCEDURE — 87102 FUNGUS ISOLATION CULTURE: CPT | Performed by: PHYSICIAN ASSISTANT

## 2024-05-29 PROCEDURE — 87070 CULTURE OTHR SPECIMN AEROBIC: CPT | Performed by: PHYSICIAN ASSISTANT

## 2024-05-29 PROCEDURE — 87075 CULTR BACTERIA EXCEPT BLOOD: CPT | Performed by: PHYSICIAN ASSISTANT

## 2024-05-29 PROCEDURE — 89050 BODY FLUID CELL COUNT: CPT | Performed by: PHYSICIAN ASSISTANT

## 2024-05-29 PROCEDURE — 85652 RBC SED RATE AUTOMATED: CPT | Performed by: EMERGENCY MEDICINE

## 2024-05-29 PROCEDURE — S9083 URGENT CARE CENTER GLOBAL: HCPCS | Performed by: PHYSICIAN ASSISTANT

## 2024-05-29 PROCEDURE — 99285 EMERGENCY DEPT VISIT HI MDM: CPT | Performed by: EMERGENCY MEDICINE

## 2024-05-29 PROCEDURE — 85025 COMPLETE CBC W/AUTO DIFF WBC: CPT | Performed by: EMERGENCY MEDICINE

## 2024-05-29 PROCEDURE — 36415 COLL VENOUS BLD VENIPUNCTURE: CPT | Performed by: EMERGENCY MEDICINE

## 2024-05-29 PROCEDURE — 80048 BASIC METABOLIC PNL TOTAL CA: CPT | Performed by: EMERGENCY MEDICINE

## 2024-05-29 RX ORDER — LIDOCAINE HYDROCHLORIDE 10 MG/ML
5 INJECTION, SOLUTION EPIDURAL; INFILTRATION; INTRACAUDAL; PERINEURAL ONCE
Status: COMPLETED | OUTPATIENT
Start: 2024-05-29 | End: 2024-05-29

## 2024-05-29 RX ADMIN — LIDOCAINE HYDROCHLORIDE 5 ML: 10 INJECTION, SOLUTION EPIDURAL; INFILTRATION; INTRACAUDAL; PERINEURAL at 12:53

## 2024-05-29 NOTE — DISCHARGE INSTRUCTIONS
Lab tests do not show any sign of infection in the elbow.    The orthopedic group will follow-up on the rest of your tests.    Continue your present medications.  Take Tylenol as needed for pain.  Try your Voltaren gel as directed for elbow pain.  You can also take 1 or 2 ibuprofen tabs with food every 8-12 hours if needed for pain.  Keep well-hydrated.    Call the number below to schedule appointment with orthopedics.

## 2024-05-29 NOTE — ED PROVIDER NOTES
History  Chief Complaint   Patient presents with    Elbow Swelling     Sent to ED for right elbow swelling since this morning. Referred by urgent care.      83-year-old male with history of hypertension, hyperlipidemia and osteoarthritis complains of tenderness, swelling and limited range of motion of right elbow.  This started this morning.  He was changing his oil filter and oil of the truck last night.  This required him to lay on the ground.  He also washed multiple large windows in the house yesterday.  Had some mild discomfort overlying the lateral epicondyle last night but today notes swelling, slight warmth and erythema.  Denies distal neurovascular problems.  Denies recent illness and any constitutional symptoms.  Never had problems with his joints before.  He was seen at urgent care and sent here for further workup.        Prior to Admission Medications   Prescriptions Last Dose Informant Patient Reported? Taking?   aspirin 81 MG tablet  Self Yes No   Sig: Take 1 tablet by mouth daily    diclofenac sodium (VOLTAREN) 1 %  Self No No   Sig: Apply 2 g topically 4 (four) times a day   ezetimibe (ZETIA) 10 mg tablet   No No   Sig: TAKE 1 TABLET BY MOUTH EVERY DAY   lisinopril (ZESTRIL) 20 mg tablet   No No   Sig: Take 1 tablet (20 mg total) by mouth daily   predniSONE 10 mg tablet   No No   Si tabs on Day 1,5 tabs on Day 2,4 tabs on Day 3,3 tabs on Day 4,2 tabs on  Day 5And 1 tab on Day 6   Patient not taking: Reported on 2024   rosuvastatin (CRESTOR) 5 mg tablet   No No   Sig: Take 1 tablet (5 mg total) by mouth daily   sildenafil (REVATIO) 20 mg tablet   No No   Sig: Take 3-5 tablets daily as directed   trospium chloride (SANCTURA) 20 mg tablet   No No   Sig: Take 1 tablet (20 mg total) by mouth daily at bedtime      Facility-Administered Medications: None       Past Medical History:   Diagnosis Date    Arthritis     Chronic sinusitis     Last Assessed:3/26/14    Colon polyp     Degeneration of  cervical intervertebral disc     Last Assessed:3/26/14    Derangement of unspecified medial meniscus due to old tear or injury, left knee     Last Assessed:4/10/14    Erythema migrans (Lyme disease)     Last Assessed:5/22/13    Eustachian tube dysfunction     Last Assessed:9/29/15    HL (hearing loss)     Hyperlipidemia     Hypertension     Osteoarthritis of wrist     Last Assessed:5/13/15    Primary osteoarthritis of left knee     Last Assessed:7/16/14    Spondylosis of cervical region without myelopathy or radiculopathy     Last Assessed:3/31/14    TMJ syndrome     Last Assessed:12/30/14    Trigger finger of left hand     Last Assessed:5/13/15    Trigger finger of right hand     Little finger, middle finger       Past Surgical History:   Procedure Laterality Date    CATARACT EXTRACTION      COLONOSCOPY      KNEE SURGERY      NEUROPLASTY / TRANSPOSITION MEDIAN NERVE AT CARPAL TUNNEL      CT NEUROPLASTY &/TRANSPOS MEDIAN NRV CARPAL TUNNE Left 4/25/2019    Procedure: RELEASE CARPAL TUNNEL OPEN;  Surgeon: Carson Poole MD;  Location: [facility];  Service: Orthopedics    CT TENDON SHEATH INCISION Left 4/25/2019    Procedure: RELEASE TRIGGER FINGER - LEFT SMALL;  Surgeon: Carson Poole MD;  Location: [facility];  Service: Orthopedics    SHOULDER ARTHROSCOPY Bilateral     SHOULDER SURGERY      TONSILLECTOMY      TONSILLECTOMY         Family History   Problem Relation Age of Onset    Heart disease Mother         Coronary heart disease    Dementia Mother     Heart disease Father         Coronary heart disease    Lung cancer Paternal Grandmother      I have reviewed and agree with the history as documented.    E-Cigarette/Vaping    E-Cigarette Use Never User      E-Cigarette/Vaping Substances    Nicotine No     THC No     CBD No     Flavoring No     Other No     Unknown No      Social History     Tobacco Use    Smoking status: Former     Types: Cigars     Passive exposure: Past    Smokeless tobacco: Never   Vaping  Use    Vaping status: Never Used   Substance Use Topics    Alcohol use: Yes     Alcohol/week: 14.0 standard drinks of alcohol     Types: 14 Glasses of wine per week     Comment: Social drinker/Denies alcohol causing problems    Drug use: No       Review of Systems   Constitutional:  Negative for chills and fever.   Respiratory:  Negative for cough.    Cardiovascular:  Negative for chest pain.   Gastrointestinal:  Negative for abdominal pain.   Neurological:  Negative for weakness and numbness.       Physical Exam  Physical Exam  Vitals and nursing note reviewed.   Constitutional:       General: He is not in acute distress.     Appearance: He is well-developed and normal weight. He is not ill-appearing or diaphoretic.   HENT:      Head: Normocephalic and atraumatic.      Right Ear: External ear normal.      Left Ear: External ear normal.      Mouth/Throat:      Mouth: Mucous membranes are moist.      Pharynx: Oropharynx is clear.   Eyes:      General: No scleral icterus.     Conjunctiva/sclera: Conjunctivae normal.   Neck:      Vascular: No JVD.   Cardiovascular:      Rate and Rhythm: Normal rate and regular rhythm.      Pulses: Normal pulses.      Heart sounds: Normal heart sounds.   Pulmonary:      Effort: Pulmonary effort is normal. No respiratory distress.      Breath sounds: Normal breath sounds.   Abdominal:      Palpations: Abdomen is soft.      Tenderness: There is no abdominal tenderness.   Musculoskeletal:         General: Swelling and tenderness (Right elbow with effusion.  No warmth or erythema.  No skin changes.  No drainage.  There is approximately 20 degrees of flexion deficits and 10 degrees extensor deficit.  Distal neurovascular intact.  No other joints are tender, swollen, red or warm.) present. No signs of injury. Normal range of motion.      Cervical back: Neck supple.   Skin:     General: Skin is warm and dry.      Capillary Refill: Capillary refill takes less than 2 seconds.      Findings: No  bruising, lesion or rash.   Neurological:      General: No focal deficit present.      Mental Status: He is alert and oriented to person, place, and time. Mental status is at baseline.      Cranial Nerves: No cranial nerve deficit.      Coordination: Coordination normal.      Deep Tendon Reflexes: Reflexes are normal and symmetric.   Psychiatric:         Mood and Affect: Mood normal.         Behavior: Behavior normal.         Vital Signs  ED Triage Vitals [05/29/24 1034]   Temperature Pulse Respirations Blood Pressure SpO2   (!) 97.3 °F (36.3 °C) 74 18 (!) 189/78 99 %      Temp Source Heart Rate Source Patient Position - Orthostatic VS BP Location FiO2 (%)   Oral Monitor Lying Right arm --      Pain Score       No Pain           Vitals:    05/29/24 1034 05/29/24 1403   BP: (!) 189/78 160/74   Pulse: 74 69   Patient Position - Orthostatic VS: Lying Sitting         Visual Acuity      ED Medications  Medications   lidocaine (PF) (XYLOCAINE-MPF) 1 % injection 5 mL (5 mL Infiltration Given by Other 5/29/24 1253)       Diagnostic Studies  Results Reviewed       Procedure Component Value Units Date/Time    Anaerobic culture and Gram stain [[id number]] Collected: 05/29/24 1321    Lab Status: Final result Specimen: Synovial Fluid from Joint, Right Elbow Updated: 06/01/24 0708     Anaerobic Culture No growth    Body fluid culture and Gram stain [[id number]] Collected: 05/29/24 1321    Lab Status: Preliminary result Specimen: Body Fluid from Joint, Right Elbow Updated: 05/31/24 1023     Body Fluid Culture, Sterile No growth     Gram Stain Result 1+ Disintegrating polys      No bacteria seen    Synovial fluid, crystal [[id number]] Collected: 05/29/24 1321    Lab Status: Final result Specimen: Synovial Fluid from Joint, Right Elbow Updated: 05/29/24 1904     Crystals, Synovial Fluid No Crystals Seen    Synovial Fluid Diff [[id number]] Collected: 05/29/24 1321    Lab Status: Final result Specimen: Synovial Fluid from Joint, Right  Elbow Updated: 05/29/24 1459     Total Counted 100     Neutrophil % Synovial 87 %      Lymph % Synovial 2 %      Monocyte % Synovial 11 %     RBC count,Synovial Fluid [[id number]]  (Normal) Collected: 05/29/24 1321    Lab Status: Final result Specimen: Synovial Fluid from Joint, Right Elbow Updated: 05/29/24 1456     RBC, SYNOVIAL 0    Synovial fluid white cell count w/ diff [[id number]]  (Abnormal) Collected: 05/29/24 1321    Lab Status: Final result Specimen: Synovial Fluid from Joint, Right Elbow Updated: 05/29/24 1454     Site synovial right elbow     Color, Fluid Yellow     Clarity, Fluid Cloudy     WBC, Fluid 20,020 /ul     STAT Gram Stain [[id number]] Collected: 05/29/24 1321    Lab Status: Final result Specimen: Other from Joint, Right Elbow Updated: 05/29/24 1408     Gram Stain Result 3+ Polys      No organisms seen    Fungal culture [[id number]] Collected: 05/29/24 1322    Lab Status: In process Specimen: Synovial Fluid from Joint, Right Elbow Updated: 05/29/24 1326    Basic metabolic panel [[id number]] Collected: 05/29/24 1141    Lab Status: Final result Specimen: Blood from Arm, Left Updated: 05/29/24 1203     Sodium 138 mmol/L      Potassium 4.6 mmol/L      Chloride 102 mmol/L      CO2 30 mmol/L      ANION GAP 6 mmol/L      BUN 14 mg/dL      Creatinine 0.75 mg/dL      Glucose 104 mg/dL      Calcium 10.1 mg/dL      eGFR 86 ml/min/1.73sq m     Narrative:      National Kid[employer] Foundation guidelines for Chronic Kidney Disease (CKD):     Stage 1 with normal or high GFR (GFR > 90 mL/min/1.73 square meters)    Stage 2 Mild CKD (GFR = 60-89 mL/min/1.73 square meters)    Stage 3A Moderate CKD (GFR = 45-59 mL/min/1.73 square meters)    Stage 3B Moderate CKD (GFR = 30-44 mL/min/1.73 square meters)    Stage 4 Severe CKD (GFR = 15-29 mL/min/1.73 square meters)    Stage 5 End Stage CKD (GFR <15 mL/min/1.73 square meters)  Note: GFR calculation is accurate only with a steady state creatinine    C-reactive protein  [[id number]]  (Abnormal) Collected: 05/29/24 1141    Lab Status: Final result Specimen: Blood from Arm, Left Updated: 05/29/24 1203     CRP 10.2 mg/L     Sedimentation rate, automated [[id number]]  (Normal) Collected: 05/29/24 1141    Lab Status: Final result Specimen: Blood from Arm, Left Updated: 05/29/24 1157     Sed Rate 9 mm/hour     CBC and differential [[id number]]  (Abnormal) Collected: 05/29/24 1141    Lab Status: Final result Specimen: Blood from Arm, Left Updated: 05/29/24 1146     WBC 10.90 Thousand/uL      RBC 4.59 Million/uL      Hemoglobin 14.8 g/dL      Hematocrit 45.9 %       fL      MCH 32.2 pg      MCHC 32.2 g/dL      RDW 12.2 %      MPV 9.8 fL      Platelets 213 Thousands/uL      nRBC 0 /100 WBCs      Segmented % 75 %      Immature Grans % 1 %      Lymphocytes % 14 %      Monocytes % 10 %      Eosinophils Relative 0 %      Basophils Relative 0 %      Absolute Neutrophils 8.08 Thousands/µL      Absolute Immature Grans 0.08 Thousand/uL      Absolute Lymphocytes 1.55 Thousands/µL      Absolute Monocytes 1.13 Thousand/µL      Eosinophils Absolute 0.04 Thousand/µL      Basophils Absolute 0.02 Thousands/µL                    XR elbow 3+ vw right   ED Interpretation by Cr Bartlett DO (05/29 1227)   Degenerative changes.  Effusion noted.      Final Result by Jeremiah Chandler MD (05/30 0900)      No acute osseous abnormality.      Joint effusion. Degenerative changes.      Workstation performed: [id number]                    Procedures  Procedures         ED Course  ED Course as of 06/01/24 1118   Wed May 29, 2024   1114 Texted orthopedics for consult   1509 Per orthopedics, the synovial fluid cell count results probably represent inflammatory, not infectious etiology.  I will send him for office follow-up.                               SBIRT 20yo+      Flowsheet Row Most Recent Value   Initial Alcohol Screen: US AUDIT-C     1. How often do you have a drink containing alcohol? 0 Filed at:  05/29/2024 1035   2. How many drinks containing alcohol do you have on a typical day you are drinking?  0 Filed at: 05/29/2024 1035   3a. Male UNDER 65: How often do you have five or more drinks on one occasion? 0 Filed at: 05/29/2024 1035   3b. FEMALE Any Age, or MALE 65+: How often do you have 4 or more drinks on one occassion? 0 Filed at: 05/29/2024 1035   Audit-C Score 0 Filed at: 05/29/2024 1035   PEGGY: How many times in the past year have you...    Used an illegal drug or used a prescription medication for non-medical reasons? Never Filed at: 05/29/2024 1035                      Medical Decision Making  Acute right elbow effusion.  No evidence of olecranon bursitis.  No signs of cellulitis. Concern for septic or inflammatory joint. Although this is of sudden onset after leaning on elbow, there is no sign of puncture wound, foreign body or obvious cellulitis. No prior arthropathies.  No recent infectious Sx's that would be consistent with Lyme or other arthropod-born illness.  Will check labs, imaging and likely consult ortho.    Amount and/or Complexity of Data Reviewed  External Data Reviewed: notes.  Labs: ordered.  Radiology: ordered and independent interpretation performed.  Discussion of management or test interpretation with external provider(s): Pollo DENNY, DENISHA Vasquez.  She performed joint aspiration.    Risk  Prescription drug management.             Disposition  Final diagnoses:   Elbow effusion, right     Time reflects when diagnosis was documented in both MDM as applicable and the Disposition within this note       Time User Action Codes Description Comment    5/29/2024 11:59 Cr Mackey Add [M25.421] Elbow effusion, right           ED Disposition       ED Disposition   Discharge    Condition   Stable    Date/Time   Wed May 29, 2024 1511    Comment   Patrick Frey discharge to home/self care.                   Follow-up Information       Follow up With Specialties Details Why  Contact Info Additional Information    [facility] Schedule an appointment as soon as possible for a visit   [address]  [facility] [address] [phone] [facility] [place], [address], [address] [place], [place], [address] [phone]            Discharge Medication List as of 5/29/2024  3:17 PM        CONTINUE these medications which have NOT CHANGED    Details   aspirin 81 MG tablet Take 1 tablet by mouth daily , Historical Med      diclofenac sodium (VOLTAREN) 1 % Apply 2 g topically 4 (four) times a day, Starting Fri 7/10/2020, Normal      ezetimibe (ZETIA) 10 mg tablet TAKE 1 TABLET BY MOUTH EVERY DAY, Starting Wed 2/21/2024, Normal      lisinopril (ZESTRIL) 20 mg tablet Take 1 tablet (20 mg total) by mouth daily, Starting Tue 12/12/2023, Normal      predniSONE 10 mg tablet 6 tabs on Day 1,5 tabs on Day 2,4 tabs on Day 3,3 tabs on Day 4,2 tabs on  Day 5And 1 tab on Day 6, Normal      rosuvastatin (CRESTOR) 5 mg tablet Take 1 tablet (5 mg total) by mouth daily, Starting Wed 12/20/2023, Normal      sildenafil (REVATIO) 20 mg tablet Take 3-5 tablets daily as directed, Normal      trospium chloride (SANCTURA) 20 mg tablet Take 1 tablet (20 mg total) by mouth daily at bedtime, Starting Tue 5/7/2024, Normal                 PDMP Review       None            ED Provider  Electronically Signed by             Cr Bartlett DO  06/01/24 1117

## 2024-05-29 NOTE — CONSULTS
Orthopedics   Patrick Frey 83 y.o. male MRN: [id number]  Unit/Bed#:       Chief Complaint:   right elbow pain    HPI:  83 y.o.male right hand dominant complaining of right elbow pain for 1 day. Denies acute injury to the elbow.  Was very active yesterday changing the oil in his truck and cleaning windows in the house.  Noted pain upon waking this morning around 1 am when he woke to use the restroom.  Went to Urgent Care this morning and was subsequently sent to the ER to rule out septic joint.  Pain is sharp in character, Located in the elbow, acute in onset, constant in duration, severe in intensity. Exacerbating factors: motion of the elbow and pressure. remitting factors: rest. Denies radiating, denies numbness, denies tingling, no open wounds noted. No other complaints at this time.  PMH significant for arthritis. Denies history of gout.  Occupation retired.      Review Of Systems:   Skin: Normal  Neuro: See HPI  Musculoskeletal: See HPI  14 point review of systems negative except as stated above     Past Medical History:   Past Medical History:   Diagnosis Date    Arthritis     Chronic sinusitis     Last Assessed:3/26/14    Colon polyp     Degeneration of cervical intervertebral disc     Last Assessed:3/26/14    Derangement of unspecified medial meniscus due to old tear or injury, left knee     Last Assessed:4/10/14    Erythema migrans (Lyme disease)     Last Assessed:5/22/13    Eustachian tube dysfunction     Last Assessed:9/29/15    HL (hearing loss)     Hyperlipidemia     Hypertension     Osteoarthritis of wrist     Last Assessed:5/13/15    Primary osteoarthritis of left knee     Last Assessed:7/16/14    Spondylosis of cervical region without myelopathy or radiculopathy     Last Assessed:3/31/14    TMJ syndrome     Last Assessed:12/30/14    Trigger finger of left hand     Last Assessed:5/13/15    Trigger finger of right hand     Little finger, middle finger       Past Surgical History:   Past Surgical  History:   Procedure Laterality Date    CATARACT EXTRACTION      COLONOSCOPY      KNEE SURGERY      NEUROPLASTY / TRANSPOSITION MEDIAN NERVE AT CARPAL TUNNEL      [place] NEUROPLASTY &/TRANSPOS MEDIAN NRV CARPAL TUNNE Left 4/25/2019    Procedure: RELEASE CARPAL TUNNEL OPEN;  Surgeon: Carson Poole MD;  [facility];  Service: Orthopedics    [place] TENDON SHEATH INCISION Left 4/25/2019    Procedure: RELEASE TRIGGER FINGER - LEFT SMALL;  Surgeon: Carson Poole MD;  [facility];  Service: Orthopedics    SHOULDER ARTHROSCOPY Bilateral     SHOULDER SURGERY      TONSILLECTOMY      TONSILLECTOMY         Family History:  Family history reviewed and non-contributory  Family History   Problem Relation Age of Onset    Heart disease Mother         Coronary heart disease    Dementia Mother     Heart disease Father         Coronary heart disease    Lung cancer Paternal Grandmother        Social History:  Social History     Socioeconomic History    Marital status: /Civil Union     Spouse name: None    Number of children: None    Years of education: None    Highest education level: None   Occupational History    Occupation: Retired   Tobacco Use    Smoking status: Former     Types: Cigars     Passive exposure: Past    Smokeless tobacco: Never   Vaping Use    Vaping status: Never Used   Substance and Sexual Activity    Alcohol use: Yes     Alcohol/week: 14.0 standard drinks of alcohol     Types: 14 Glasses of wine per week     Comment: Social drinker/Denies alcohol causing problems    Drug use: No    Sexual activity: Yes     Partners: Female   Other Topics Concern    None   Social History Narrative    Exercises regularly     Social Determinants of Health     Financial Resource Strain: Low Risk  (12/11/2023)    Overall Financial Resource Strain (CARDIA)     Difficulty of Paying Living Expenses: Not hard at all   Food Insecurity: No Food Insecurity (11/14/2019)    Hunger Vital Sign     Worried About Running  Out of Food in the Last Year: Never true     Ran Out of Food in the Last Year: Never true   Transportation Needs: No Transportation Needs (12/11/2023)    PRAPARE - Transportation     Lack of Transportation (Medical): No     Lack of Transportation (Non-Medical): No   Physical Activity: Unknown (11/14/2019)    Exercise Vital Sign     Days of Exercise per Week: Patient declined     Minutes of Exercise per Session: Patient declined   Stress: No Stress Concern Present (11/14/2019)    [employer] - Occupational Stress Questionnaire     Feeling of Stress : Not at all   Social Connections: Socially Integrated (11/14/2019)    Social Connection and Isolation Panel [NHANES]     Frequency of Communication with Friends and Family: Three times a week     Frequency of Social Gatherings with Friends and Family: Once a week     Attends [employer] Services: More than 4 times per year     Active Member of Clubs or Organizations: Yes     Attends Club or Organization Meetings: Never     Marital Status:    Intimate Partner Violence: Not At Risk (12/12/2023)    Humiliation, Afraid, Rape, and Kick questionnaire     Fear of Current or Ex-Partner: No     Emotionally Abused: No     Physically Abused: No     Sexually Abused: No   Housing Stability: Not on file       Allergies:   Allergies   Allergen Reactions    Azithromycin Other (See Comments)     Cannot recall allergy or reaction    Meperidine Other (See Comments)     Cannot recall allergy or reaction    Pseudoephedrine Other (See Comments)     Cannot recall allergy or reaction           Labs:  0   Lab Value Date/Time    HCT 45.9 05/29/2024 1141    HCT 47.1 12/13/2023 0720    HCT 47.1 12/05/2022 0739    HCT 46.5 09/22/2015 0841    HGB 14.8 05/29/2024 1141    HGB 15.1 12/13/2023 0720    HGB 14.9 12/05/2022 0739    HGB 15.6 09/22/2015 0841    INR 1.08 12/30/2019 2030    WBC 10.90 (H) 05/29/2024 1141    WBC 6.97 12/13/2023 0720    WBC 6.86 12/05/2022 0739     "WBC 6.87 09/22/2015 0841    ESR 9 05/29/2024 1141    CRP 10.2 (H) 05/29/2024 1141       Meds:  No current facility-administered medications for this encounter.    Current Outpatient Medications:     aspirin 81 MG tablet, Take 1 tablet by mouth daily , Disp: , Rfl:     diclofenac sodium (VOLTAREN) 1 %, Apply 2 g topically 4 (four) times a day, Disp: 1 Tube, Rfl: 3    ezetimibe (ZETIA) 10 mg tablet, TAKE 1 TABLET BY MOUTH EVERY DAY, Disp: 90 tablet, Rfl: 1    lisinopril (ZESTRIL) 20 mg tablet, Take 1 tablet (20 mg total) by mouth daily, Disp: 90 tablet, Rfl: 1    predniSONE 10 mg tablet, 6 tabs on Day 1,5 tabs on Day 2,4 tabs on Day 3,3 tabs on Day 4,2 tabs on  Day 5And 1 tab on Day 6 (Patient not taking: Reported on 5/7/2024), Disp: 21 tablet, Rfl: 0    rosuvastatin (CRESTOR) 5 mg tablet, Take 1 tablet (5 mg total) by mouth daily, Disp: 90 tablet, Rfl: 1    sildenafil (REVATIO) 20 mg tablet, Take 3-5 tablets daily as directed, Disp: 50 tablet, Rfl: 3    trospium chloride (SANCTURA) 20 mg tablet, Take 1 tablet (20 mg total) by mouth daily at bedtime, Disp: 60 tablet, Rfl: 1    Blood Culture:   No results found for: \"BLOODCX\"    Wound Culture:   No results found for: \"WOUNDCULT\"      Physical Exam:   /74 (BP Location: Left arm)   Pulse 69   Temp 98.2 °F (36.8 °C) (Oral)   Resp 18   SpO2 99%   Gen: No acute distress, resting comfortably in bed  HEENT: Eyes clear, moist mucus membranes, hearing intact  Respiratory: No audible wheezing or stridor  Cardiovascular: Well Perfused peripherally, 2+ distal pulse  Abdomen: nondistended, no peritoneal signs  Musculoskeletal: right upper extremity  Skin pink, dry, and intact, no erythema  TTP at right elbow joint  Effusion of right elbow  Active  degrees flexion, 30 degrees shy of full extension,  Full supination and pronation  Passive  degrees flexion with pain, 20 degrees shy of full extension, full pronation and supination  Sensation intact to axillary, " musculocutaneous, radial, ulna, median nerves  Motor intact to axillary, musculocutaneous, radial, ulnar, and median nerves  2+ Radial and ulnar Pulse  Musculature is soft and compressible    PROCEDURE  Right Elbow aspiration    Right elbow joint was palpated and marked.  Area was cleansed with Chlorhexidine.  2 ml of 1% Lidocaine used for analgesic with 25 gauge needle.  Area was prep with Chlorhexidine again prior to insertion of 18 gauge needle.  11 ml of murky yellow fluid was aspirated from the right elbow joint.  This fluid was sent to the lab for analysis.  Gauze and Ace Wrap provided for compression.  At the end of the procedure, patient had near full range of motion of the elbow with some mild discomfort (improved from pre-aspiration).  Patient remained NVI.    Radiology:   I personally reviewed the films.  X-rays 3+ views of right elbow shows mild osteoarthritis with moderate effusion, no fracture or dislocation      _*_*_*_*_*_*_*_*_*_*_*_*_*_*_*_*_*_*_*_*_*_*_*_*_*_*_*_*_*_*_*_*_*_*_*_*_*_*_*_*_*    Assessment:  83 y.o.male with right elbow effusion, likely arthritic flare vs crystalline arthropathy.     Plan:   WBAT right upper extremity  ACE wrap.  May remove later today  Ortho will follow remaining labs sent from right elbow aspiration  Analgesics for pain - Tylenol 1000 mg every 8 hours as needed, NSAIDs such as Ibuprofen if not contraindicated  Ice - 20 minutes on and 20 minutes off  Avoid strenuous upper body activities x 24-48 hours  ROM of the elbow to tolerance  Follow up: as outpatient if symptoms reoccur or persist despite above recommendations.

## 2024-05-29 NOTE — PROGRESS NOTES
[facility]        NAME: Patrick Frey is a 83 y.o. male  : 1940    MRN: [id number]  DATE: May 29, 2024  TIME: 10:36 AM    Assessment and Plan   Elbow swelling, right [M25.421]  1. Elbow swelling, right  Transfer to other facility            Patient Instructions       Go to ER for evaluation    Chief Complaint     Chief Complaint   Patient presents with    Elbow Pain     Pt reports right elbow pain that began yesterday after working on his truck. Denies injury. Reports increased pain/swelling today. Unable to fully bend/straighten the right elbow. Applied voltaren gel.          History of Present Illness       83-year-old male presents with swollen painful right elbow.  Patient states symptoms started today.  Denies any traumas or injuries.  Patient states that the previous no injuries or traumas.  Denies any falls.  Patient states he was just a normal day of doing stuff around the house.  Patient woke up this morning with elbow swollen and painful.  Patient states that he has limited range of motion with the elbow currently.  Denies any numbness or tingling.  No previous surgeries or injuries to the elbow.    Elbow Pain  This is a new problem. The current episode started today. The problem occurs constantly. The problem has been gradually worsening. Associated symptoms include joint swelling. Pertinent negatives include no abdominal pain, anorexia, chills, fever, nausea, neck pain or weakness. The symptoms are aggravated by bending. He has tried nothing for the symptoms. The treatment provided no relief.       Review of Systems   Review of Systems   Constitutional: Negative.  Negative for chills and fever.   HENT: Negative.     Eyes: Negative.    Respiratory: Negative.     Cardiovascular: Negative.    Gastrointestinal: Negative.  Negative for abdominal pain, anorexia and nausea.   Musculoskeletal:  Positive for joint swelling. Negative for neck pain.   Skin: Negative.    Neurological: Negative.   Negative for weakness.         Current Medications       Current Outpatient Medications:     aspirin 81 MG tablet, Take 1 tablet by mouth daily , Disp: , Rfl:     diclofenac sodium (VOLTAREN) 1 %, Apply 2 g topically 4 (four) times a day, Disp: 1 Tube, Rfl: 3    ezetimibe (ZETIA) 10 mg tablet, TAKE 1 TABLET BY MOUTH EVERY DAY, Disp: 90 tablet, Rfl: 1    lisinopril (ZESTRIL) 20 mg tablet, Take 1 tablet (20 mg total) by mouth daily, Disp: 90 tablet, Rfl: 1    rosuvastatin (CRESTOR) 5 mg tablet, Take 1 tablet (5 mg total) by mouth daily, Disp: 90 tablet, Rfl: 1    trospium chloride (SANCTURA) 20 mg tablet, Take 1 tablet (20 mg total) by mouth daily at bedtime, Disp: 60 tablet, Rfl: 1    predniSONE 10 mg tablet, 6 tabs on Day 1,5 tabs on Day 2,4 tabs on Day 3,3 tabs on Day 4,2 tabs on  Day 5And 1 tab on Day 6 (Patient not taking: Reported on 5/7/2024), Disp: 21 tablet, Rfl: 0    sildenafil (REVATIO) 20 mg tablet, Take 3-5 tablets daily as directed, Disp: 50 tablet, Rfl: 3    Current Allergies     Allergies as of 05/29/2024 - Reviewed 05/29/2024   Allergen Reaction Noted    Azithromycin Other (See Comments) 05/03/2016    Meperidine Other (See Comments) 01/21/2013    Pseudoephedrine Other (See Comments) 01/21/2013            The following portions of the patient's history were reviewed and updated as appropriate: allergies, current medications, past family history, past medical history, past social history, past surgical history and problem list.     Past Medical History:   Diagnosis Date    Arthritis     Chronic sinusitis     Last Assessed:3/26/14    Colon polyp     Degeneration of cervical intervertebral disc     Last Assessed:3/26/14    Derangement of unspecified medial meniscus due to old tear or injury, left knee     Last Assessed:4/10/14    Erythema migrans (Lyme disease)     Last Assessed:5/22/13    Eustachian tube dysfunction     Last Assessed:9/29/15    HL (hearing loss)     Hyperlipidemia     Hypertension      Osteoarthritis of wrist     Last Assessed:5/13/15    Primary osteoarthritis of left knee     Last Assessed:7/16/14    Spondylosis of cervical region without myelopathy or radiculopathy     Last Assessed:3/31/14    TMJ syndrome     Last Assessed:12/30/14    Trigger finger of left hand     Last Assessed:5/13/15    Trigger finger of right hand     Little finger, middle finger       Past Surgical History:   Procedure Laterality Date    CATARACT EXTRACTION      COLONOSCOPY      KNEE SURGERY      NEUROPLASTY / TRANSPOSITION MEDIAN NERVE AT CARPAL TUNNEL      [place] NEUROPLASTY &/TRANSPOS MEDIAN NRV CARPAL TUNNE Left 4/25/2019    Procedure: RELEASE CARPAL TUNNEL OPEN;  Surgeon: Carson Poole MD;  [facility];  Service: Orthopedics    [place] TENDON SHEATH INCISION Left 4/25/2019    Procedure: RELEASE TRIGGER FINGER - LEFT SMALL;  Surgeon: Carson Poole MD;  [facility];  Service: Orthopedics    SHOULDER ARTHROSCOPY Bilateral     SHOULDER SURGERY      TONSILLECTOMY      TONSILLECTOMY         Family History   Problem Relation Age of Onset    Heart disease Mother         Coronary heart disease    Dementia Mother     Heart disease Father         Coronary heart disease    Lung cancer Paternal Grandmother          Medications have been verified.        Objective   /82   Pulse 81   Temp (!) 96.6 °F (35.9 °C)   Resp 16   Wt 65.3 kg (144 lb)   SpO2 98%   BMI 21.90 kg/m²   No LMP for male patient.       Physical Exam     Physical Exam  Vitals and nursing note reviewed.   Constitutional:       General: He is not in acute distress.     Appearance: Normal appearance. He is well-developed.   HENT:      Head: Normocephalic and atraumatic.      Right Ear: Hearing, tympanic membrane, ear canal and external ear normal. There is no impacted cerumen.      Left Ear: Hearing, tympanic membrane, ear canal and external ear normal. There is no impacted cerumen.      Nose: Nose normal.      Mouth/Throat:      Pharynx:  Uvula midline. No oropharyngeal exudate.   Eyes:      General:         Right eye: No discharge.         Left eye: No discharge.      Conjunctiva/sclera: Conjunctivae normal.   Cardiovascular:      Rate and Rhythm: Normal rate and regular rhythm.      Heart sounds: Normal heart sounds. No murmur heard.  Pulmonary:      Effort: Pulmonary effort is normal. No respiratory distress.      Breath sounds: Normal breath sounds. No wheezing or rales.   Abdominal:      General: Bowel sounds are normal.      Palpations: Abdomen is soft.      Tenderness: There is no abdominal tenderness.   Musculoskeletal:      Right elbow: Swelling (Mild erythema noted to elbow joint with warmth to touch) and effusion present. No deformity or lacerations. Decreased range of motion (Flexion to around 110 degrees and extension to 40 to 50 degrees extension.). Tenderness (Generalized tenderness around the elbow joint.) present.      Cervical back: Normal range of motion and neck supple.   Lymphadenopathy:      Cervical: No cervical adenopathy.   Skin:     General: Skin is warm and dry.   Neurological:      Mental Status: He is alert and oriented to person, place, and time.   Psychiatric:         Mood and Affect: Mood normal.           MDM: Patient sent to ER for evaluation.  Due to history and physical concern about possible septic joint at this time.  Patient is traveling to the ER via personal vehicle

## 2024-05-31 ENCOUNTER — TELEPHONE (OUTPATIENT)
Dept: OTHER | Facility: HOSPITAL | Age: 84
End: 2024-05-31

## 2024-06-01 LAB
BACTERIA SPEC ANAEROBE CULT: NO GROWTH
BACTERIA SPEC BFLD CULT: ABNORMAL
GRAM STN SPEC: ABNORMAL
GRAM STN SPEC: ABNORMAL

## 2024-06-03 LAB — FUNGUS SPEC CULT: NORMAL

## 2024-06-04 ENCOUNTER — OFFICE VISIT (OUTPATIENT)
Dept: OBGYN CLINIC | Facility: CLINIC | Age: 84
End: 2024-06-04
Payer: COMMERCIAL

## 2024-06-04 VITALS
BODY MASS INDEX: 22.73 KG/M2 | WEIGHT: 150 LBS | SYSTOLIC BLOOD PRESSURE: 120 MMHG | HEIGHT: 68 IN | DIASTOLIC BLOOD PRESSURE: 70 MMHG

## 2024-06-04 DIAGNOSIS — M25.421 ELBOW EFFUSION, RIGHT: ICD-10-CM

## 2024-06-04 DIAGNOSIS — M19.021 LOCALIZED PRIMARY OSTEOARTHRITIS OF RIGHT ELBOW: Primary | ICD-10-CM

## 2024-06-04 PROCEDURE — 99213 OFFICE O/P EST LOW 20 MIN: CPT | Performed by: ORTHOPAEDIC SURGERY

## 2024-06-04 NOTE — PROGRESS NOTES
Assessment:     1. Localized primary osteoarthritis of right elbow    2. Elbow effusion, right        Plan:     Problem List Items Addressed This Visit          Musculoskeletal and Integument    Localized primary osteoarthritis of right elbow - Primary    Elbow effusion, right    Findings consistent with resolved right elbow effusion/pain most likely a result of the osteoarthritis in his right elbow.  Findings and treatment options were discussed with the patient.  Discussed that although the cultures revealed growth of staph coag negative from broth culture only, it was most likely a contaminant.  All other testing was negative for infection, gout or pseudogout.  Symptoms have resolved.  He may resume activities as tolerated.  Follow-up as needed if any problems arise.  All patient's questions were answered to his satisfaction.  This note is created using dictation transcription.  It may contain typographical errors, grammatical errors, improperly dictated words, background noise and other errors.    Subjective:     Patient ID: Patrick Frey is a 83 y.o. male.  Chief Complaint:  This is a right-hand-dominant 83-year-old white male here for evaluation of his right elbow.  He is following up after emergency room consult done on May 28, 2024.  Patient states that on May 27, 2024 he developed aching pain in the right elbow.  He states he did not have any injury that day.  He changed the oil on his truck and helped his wife clean several windows.  That evening he started to notice the aching discomfort.  By the next morning he had severe pain, swelling and inability to bear weight on that arm.  He went to the emergency room on May 28, 2024 and x-rays were taken.  He was seen in the emergency room by orthopedics and the right elbow joint was aspirated.  Fluid was sent for testing that was all negative except for growth of staph coag negative in broth culture only.  At this time he denies having any pain in the right  elbow.  He feels significant improvement after the aspiration.  He is back doing all of his normal activities.  He denies any fevers, chills, swelling or erythema of the right elbow.  No prior injury to the right elbow.    Allergy:  Allergies   Allergen Reactions    Azithromycin Other (See Comments)     Cannot recall allergy or reaction    Meperidine Other (See Comments)     Cannot recall allergy or reaction    Pseudoephedrine Other (See Comments)     Cannot recall allergy or reaction     Medications:  all current active meds have been reviewed  Past Medical History:  Past Medical History:   Diagnosis Date    Arthritis     Chronic sinusitis     Last Assessed:3/26/14    Colon polyp     Degeneration of cervical intervertebral disc     Last Assessed:3/26/14    Derangement of unspecified medial meniscus due to old tear or injury, left knee     Last Assessed:4/10/14    Erythema migrans (Lyme disease)     Last Assessed:5/22/13    Eustachian tube dysfunction     Last Assessed:9/29/15    HL (hearing loss)     Hyperlipidemia     Hypertension     Osteoarthritis of wrist     Last Assessed:5/13/15    Primary osteoarthritis of left knee     Last Assessed:7/16/14    Spondylosis of cervical region without myelopathy or radiculopathy     Last Assessed:3/31/14    TMJ syndrome     Last Assessed:12/30/14    Trigger finger of left hand     Last Assessed:5/13/15    Trigger finger of right hand     Little finger, middle finger     Past Surgical History:  Past Surgical History:   Procedure Laterality Date    CATARACT EXTRACTION      COLONOSCOPY      KNEE SURGERY      NEUROPLASTY / TRANSPOSITION MEDIAN NERVE AT CARPAL TUNNEL      [place] NEUROPLASTY &/TRANSPOS MEDIAN NRV CARPAL TUNNE Left 4/25/2019    Procedure: RELEASE CARPAL TUNNEL OPEN;  Surgeon: Carson Poole MD;  Location:  MAIN OR;  Service: Orthopedics    [place] TENDON SHEATH INCISION Left 4/25/2019    Procedure: RELEASE TRIGGER FINGER - LEFT SMALL;  Surgeon: Carson Poole MD;   "Location:  MAIN OR;  Service: Orthopedics    SHOULDER ARTHROSCOPY Bilateral     SHOULDER SURGERY      TONSILLECTOMY      TONSILLECTOMY       Family History:  Family History   Problem Relation Age of Onset    Heart disease Mother         Coronary heart disease    Dementia Mother     Heart disease Father         Coronary heart disease    Lung cancer Paternal Grandmother      Social History:  Social History     Substance and Sexual Activity   Alcohol Use Yes    Alcohol/week: 14.0 standard drinks of alcohol    Types: 14 Glasses of wine per week    Comment: Social drinker/Denies alcohol causing problems     Social History     Substance and Sexual Activity   Drug Use No     Social History     Tobacco Use   Smoking Status Former    Types: Cigars    Passive exposure: Past   Smokeless Tobacco Never     Review of Systems   Constitutional: Negative.    HENT: Negative.     Eyes: Negative.    Respiratory: Negative.     Cardiovascular: Negative.    Gastrointestinal: Negative.    Endocrine: Negative.    Genitourinary: Negative.    Musculoskeletal:  Negative for arthralgias and joint swelling.   Skin: Negative.    Neurological: Negative.    Hematological: Negative.    Psychiatric/Behavioral: Negative.           Objective:  BP Readings from Last 1 Encounters:   06/04/24 120/70      Wt Readings from Last 1 Encounters:   06/04/24 68 kg (150 lb)      BMI:   Estimated body mass index is 22.81 kg/m² as calculated from the following:    Height as of this encounter: 5' 8\" (1.727 m).    Weight as of this encounter: 68 kg (150 lb).  BSA:   Estimated body surface area is 1.81 meters squared as calculated from the following:    Height as of this encounter: 5' 8\" (1.727 m).    Weight as of this encounter: 68 kg (150 lb).   Physical Exam  Constitutional:       General: He is not in acute distress.     Appearance: He is well-developed.   HENT:      Head: Normocephalic.   Eyes:      Conjunctiva/sclera: Conjunctivae normal.      Pupils: Pupils " are equal, round, and reactive to light.   Pulmonary:      Effort: Pulmonary effort is normal. No respiratory distress.   Skin:     General: Skin is warm and dry.   Neurological:      Mental Status: He is alert and oriented to person, place, and time.   Psychiatric:         Behavior: Behavior normal.       Right Elbow Exam     Tenderness   The patient is experiencing no tenderness.     Range of Motion   The patient has normal right elbow ROM.    Muscle Strength   The patient has normal right elbow strength.    Tests   Varus: negative  Valgus: negative    Other   Erythema: absent  Scars: absent  Sensation: normal  Pulse: present    Comments:  No swelling of elbow joint  No erythema or warmth            I have personally reviewed pertinent films in PACS and my interpretation is x-rays of the right elbow reveal mild osteoarthritis.    Scribe Attestation      I,:  Tammy Watters PA-C am acting as a scribe while in the presence of the attending physician.:       I,:  Bryanna Muhammad MD personally performed the services described in this documentation    as scribed in my presence.:

## 2024-06-10 LAB — FUNGUS SPEC CULT: NORMAL

## 2024-06-17 LAB — FUNGUS SPEC CULT: NORMAL

## 2024-06-24 ENCOUNTER — RA CDI HCC (OUTPATIENT)
Dept: OTHER | Facility: HOSPITAL | Age: 84
End: 2024-06-24

## 2024-06-24 LAB — FUNGUS SPEC CULT: NORMAL

## 2024-07-01 LAB — FUNGUS SPEC CULT: NORMAL

## 2024-07-02 ENCOUNTER — OFFICE VISIT (OUTPATIENT)
Dept: FAMILY MEDICINE CLINIC | Facility: CLINIC | Age: 84
End: 2024-07-02
Payer: COMMERCIAL

## 2024-07-02 VITALS
HEART RATE: 74 BPM | OXYGEN SATURATION: 100 % | TEMPERATURE: 97.4 F | DIASTOLIC BLOOD PRESSURE: 78 MMHG | RESPIRATION RATE: 17 BRPM | WEIGHT: 149.6 LBS | HEIGHT: 68 IN | BODY MASS INDEX: 22.67 KG/M2 | SYSTOLIC BLOOD PRESSURE: 136 MMHG

## 2024-07-02 DIAGNOSIS — E83.42 HYPOMAGNESEMIA: ICD-10-CM

## 2024-07-02 DIAGNOSIS — K21.9 LARYNGOPHARYNGEAL REFLUX (LPR): Primary | ICD-10-CM

## 2024-07-02 DIAGNOSIS — I10 ESSENTIAL HYPERTENSION: ICD-10-CM

## 2024-07-02 DIAGNOSIS — E78.5 HYPERLIPIDEMIA, UNSPECIFIED HYPERLIPIDEMIA TYPE: ICD-10-CM

## 2024-07-02 PROCEDURE — 99213 OFFICE O/P EST LOW 20 MIN: CPT | Performed by: FAMILY MEDICINE

## 2024-07-02 PROCEDURE — G2211 COMPLEX E/M VISIT ADD ON: HCPCS | Performed by: FAMILY MEDICINE

## 2024-07-02 RX ORDER — LISINOPRIL 20 MG/1
20 TABLET ORAL DAILY
Qty: 90 TABLET | Refills: 1 | Status: CANCELLED | OUTPATIENT
Start: 2024-07-02

## 2024-07-02 RX ORDER — LISINOPRIL 20 MG/1
20 TABLET ORAL DAILY
Qty: 90 TABLET | Refills: 1 | Status: SHIPPED | OUTPATIENT
Start: 2024-07-02

## 2024-07-02 RX ORDER — FAMOTIDINE 40 MG/1
40 TABLET, FILM COATED ORAL
Qty: 90 TABLET | Refills: 1 | Status: SHIPPED | OUTPATIENT
Start: 2024-07-02 | End: 2025-06-27

## 2024-07-02 NOTE — PROGRESS NOTES
Assessment/Plan:       Diagnoses and all orders for this visit:    Laryngopharyngeal reflux (LPR)  -     famotidine (PEPCID) 40 MG tablet; Take 1 tablet (40 mg total) by mouth daily at bedtime    Essential hypertension  -     lisinopril (ZESTRIL) 20 mg tablet; Take 1 tablet (20 mg total) by mouth daily    Hyperlipidemia, unspecified hyperlipidemia type  -     Comprehensive metabolic panel; Future  -     Lipid panel; Future    Hypomagnesemia  -     Magnesium; Future        Refilled lisinopril  Which I do not believe is causing his symptoms  I do believe his symptoms are related to GERD  Will start Pepcid 40 mg daily  Labs ordered  To recheck his cholesterol  Follow-up as scheduled for his routine care        Subjective:     Chief Complaint   Patient presents with    Cough     Pt c/o cough/tickle in throat that comes and goes. Otherwise feels ok. Using halls. Does not take daily allergy medication.         Patient ID: Patrick Frey is a 83 y.o. male.    Patient presents today for multiple complaints  He is doing better on the Zetia but needs his cholesterol rechecked  His biggest complaint today is a lot of excessive phlegm in his throat area  Sometimes is worse after eating  He does not have much sinus congestion today    Cough        The following portions of the patient's history were reviewed and updated as appropriate: allergies, current medications, past family history, past medical history, past social history, past surgical history and problem list.    Review of Systems   Constitutional: Negative.    HENT: Negative.     Eyes: Negative.    Respiratory:  Positive for cough.    Cardiovascular: Negative.    Gastrointestinal: Negative.    Endocrine: Negative.    Genitourinary: Negative.    Musculoskeletal: Negative.    Skin: Negative.    Allergic/Immunologic: Negative.    Neurological: Negative.    Hematological: Negative.    Psychiatric/Behavioral: Negative.     All other systems reviewed and are negative.    "     Objective:    Vitals:    07/02/24 1021   BP: 136/78   BP Location: Left arm   Patient Position: Sitting   Cuff Size: Standard   Pulse: 74   Resp: 17   Temp: (!) 97.4 °F (36.3 °C)   TempSrc: Tympanic   SpO2: 100%   Weight: 67.9 kg (149 lb 9.6 oz)   Height: 5' 8\" (1.727 m)          Physical Exam  Vitals and nursing note reviewed.   Constitutional:       Appearance: Normal appearance.   HENT:      Head: Normocephalic.      Right Ear: Tympanic membrane, ear canal and external ear normal.      Left Ear: Tympanic membrane, ear canal and external ear normal.      Nose: Nose normal.      Mouth/Throat:      Mouth: Mucous membranes are moist.   Eyes:      Conjunctiva/sclera: Conjunctivae normal.      Pupils: Pupils are equal, round, and reactive to light.   Cardiovascular:      Rate and Rhythm: Normal rate and regular rhythm.   Pulmonary:      Effort: Pulmonary effort is normal.      Breath sounds: Normal breath sounds.   Abdominal:      General: Abdomen is flat. Bowel sounds are normal.      Palpations: Abdomen is soft.   Musculoskeletal:         General: Normal range of motion.   Skin:     General: Skin is warm and dry.   Neurological:      General: No focal deficit present.      Mental Status: He is alert and oriented to person, place, and time. Mental status is at baseline.   Psychiatric:         Mood and Affect: Mood normal.         Behavior: Behavior normal.         Thought Content: Thought content normal.         Judgment: Judgment normal.         "

## 2024-07-03 ENCOUNTER — APPOINTMENT (OUTPATIENT)
Dept: LAB | Facility: CLINIC | Age: 84
End: 2024-07-03
Payer: COMMERCIAL

## 2024-07-03 DIAGNOSIS — E78.5 HYPERLIPIDEMIA, UNSPECIFIED HYPERLIPIDEMIA TYPE: ICD-10-CM

## 2024-07-03 DIAGNOSIS — E83.42 HYPOMAGNESEMIA: ICD-10-CM

## 2024-07-03 LAB
ALBUMIN SERPL BCG-MCNC: 4.2 G/DL (ref 3.5–5)
ALP SERPL-CCNC: 43 U/L (ref 34–104)
ALT SERPL W P-5'-P-CCNC: 24 U/L (ref 7–52)
ANION GAP SERPL CALCULATED.3IONS-SCNC: 8 MMOL/L (ref 4–13)
AST SERPL W P-5'-P-CCNC: 28 U/L (ref 13–39)
BILIRUB SERPL-MCNC: 0.4 MG/DL (ref 0.2–1)
BUN SERPL-MCNC: 17 MG/DL (ref 5–25)
CALCIUM SERPL-MCNC: 8.9 MG/DL (ref 8.4–10.2)
CHLORIDE SERPL-SCNC: 104 MMOL/L (ref 96–108)
CHOLEST SERPL-MCNC: 184 MG/DL
CO2 SERPL-SCNC: 25 MMOL/L (ref 21–32)
CREAT SERPL-MCNC: 0.8 MG/DL (ref 0.6–1.3)
GFR SERPL CREATININE-BSD FRML MDRD: 83 ML/MIN/1.73SQ M
GLUCOSE P FAST SERPL-MCNC: 90 MG/DL (ref 65–99)
HDLC SERPL-MCNC: 49 MG/DL
LDLC SERPL CALC-MCNC: 115 MG/DL (ref 0–100)
MAGNESIUM SERPL-MCNC: 2.3 MG/DL (ref 1.9–2.7)
NONHDLC SERPL-MCNC: 135 MG/DL
POTASSIUM SERPL-SCNC: 4.3 MMOL/L (ref 3.5–5.3)
PROT SERPL-MCNC: 6.6 G/DL (ref 6.4–8.4)
SODIUM SERPL-SCNC: 137 MMOL/L (ref 135–147)
TRIGL SERPL-MCNC: 98 MG/DL

## 2024-07-03 PROCEDURE — 36415 COLL VENOUS BLD VENIPUNCTURE: CPT

## 2024-07-03 PROCEDURE — 80061 LIPID PANEL: CPT

## 2024-07-03 PROCEDURE — 80053 COMPREHEN METABOLIC PANEL: CPT

## 2024-07-03 PROCEDURE — 83735 ASSAY OF MAGNESIUM: CPT

## 2024-08-06 DIAGNOSIS — E78.5 HYPERLIPIDEMIA, UNSPECIFIED HYPERLIPIDEMIA TYPE: ICD-10-CM

## 2024-08-06 RX ORDER — EZETIMIBE 10 MG/1
10 TABLET ORAL DAILY
Qty: 90 TABLET | Refills: 1 | Status: SHIPPED | OUTPATIENT
Start: 2024-08-06

## 2024-08-07 ENCOUNTER — OFFICE VISIT (OUTPATIENT)
Dept: UROLOGY | Facility: AMBULATORY SURGERY CENTER | Age: 84
End: 2024-08-07
Payer: COMMERCIAL

## 2024-08-07 VITALS
OXYGEN SATURATION: 95 % | WEIGHT: 150 LBS | BODY MASS INDEX: 22.73 KG/M2 | DIASTOLIC BLOOD PRESSURE: 94 MMHG | HEIGHT: 68 IN | SYSTOLIC BLOOD PRESSURE: 190 MMHG | HEART RATE: 75 BPM

## 2024-08-07 DIAGNOSIS — R31.29 MICROSCOPIC HEMATURIA: ICD-10-CM

## 2024-08-07 DIAGNOSIS — R35.1 BPH ASSOCIATED WITH NOCTURIA: Primary | ICD-10-CM

## 2024-08-07 DIAGNOSIS — N40.1 BPH ASSOCIATED WITH NOCTURIA: Primary | ICD-10-CM

## 2024-08-07 LAB
POST-VOID RESIDUAL VOLUME, ML POC: 6 ML
SL AMB  POCT GLUCOSE, UA: NORMAL
SL AMB LEUKOCYTE ESTERASE,UA: NORMAL
SL AMB POCT BILIRUBIN,UA: NORMAL
SL AMB POCT BLOOD,UA: NORMAL
SL AMB POCT CLARITY,UA: CLEAR
SL AMB POCT COLOR,UA: YELLOW
SL AMB POCT KETONES,UA: NORMAL
SL AMB POCT NITRITE,UA: NORMAL
SL AMB POCT PH,UA: 5
SL AMB POCT SPECIFIC GRAVITY,UA: 1.01
SL AMB POCT URINE PROTEIN: NORMAL
SL AMB POCT UROBILINOGEN: 0.2

## 2024-08-07 PROCEDURE — 51798 US URINE CAPACITY MEASURE: CPT

## 2024-08-07 PROCEDURE — 99213 OFFICE O/P EST LOW 20 MIN: CPT

## 2024-08-07 PROCEDURE — 81003 URINALYSIS AUTO W/O SCOPE: CPT

## 2024-08-07 RX ORDER — SILODOSIN 4 MG/1
CAPSULE ORAL
Qty: 30 CAPSULE | Refills: 3 | Status: SHIPPED | OUTPATIENT
Start: 2024-08-07

## 2024-08-07 NOTE — ASSESSMENT & PLAN NOTE
AUA symptom score 14  PVR performed in the office today was found to be 6 mL  Ultrasound of the kidney and bladder performed 11/23/2022 noted a prostate volume of 55 g.  Bladder irritants reviewed with the patient.  Patient previously trialed Flomax 0.4 mg and had side effect of dizziness and lightheadedness.  Patient previously tried Myrbetriq 50 mg which did not provide any benefit to his frequent nocturia.  Patient is currently on trospium chloride 20 mg daily and reports relief of his pelvic pressure since initiating the medication, but no change in his nocturia.  We discussed that further pharmacotherapy to treat his nocturia includes a combination of trospium chloride with either a different alpha-blocker to flush effect on blood pressure including Rapaflo, combination of trospium chloride with a beta 3 agonist such as mirabegron, or combination of trospium chloride with daily low-dose Cialis.  Additionally, we discussed more invasive options including Botox injections in the bladder, PTNS, and neuromodulation with the bladder stem.  Patient deferred more invasive options at this time.  Patient will trial a combination of trospium chloride with Rapaflo 4 mg daily.  Patient will follow-up in the office in 3 months for symptom reassessment and PVR.

## 2024-08-07 NOTE — PROGRESS NOTES
8/7/2024      Assessment and Plan    83 y.o. male managed by Dr. Viveros    BPH associated with nocturia  AUA symptom score 14  PVR performed in the office today was found to be 6 mL  Ultrasound of the kidney and bladder performed 11/23/2022 noted a prostate volume of 55 g.  Bladder irritants reviewed with the patient.  Patient previously trialed Flomax 0.4 mg and had side effect of dizziness and lightheadedness.  Patient previously tried Myrbetriq 50 mg which did not provide any benefit to his frequent nocturia.  Patient is currently on trospium chloride 20 mg daily and reports relief of his pelvic pressure since initiating the medication, but no change in his nocturia.  We discussed that further pharmacotherapy to treat his nocturia includes a combination of trospium chloride with either a different alpha-blocker to flush effect on blood pressure including Rapaflo, combination of trospium chloride with a beta 3 agonist such as mirabegron, or combination of trospium chloride with daily low-dose Cialis.  Additionally, we discussed more invasive options including Botox injections in the bladder, PTNS, and neuromodulation with the bladder stem.  Patient deferred more invasive options at this time.  Patient will trial a combination of trospium chloride with Rapaflo 4 mg daily.  Patient will follow-up in the office in 3 months for symptom reassessment and PVR.        History of Present Illness  Patrick Frey is a 83 y.o. male here for evaluation of BPH with nocturia/lower urinary tract symptoms.  Patient was last seen in the office on 5/7/2024.  Patient's most bothersome urinary tract symptoms is his nocturia and states that he wakes up between 3-5 times a night to urinate.  However, the patient denies any daytime urinary symptoms such as weakened urinary stream or increased urinary frequency.  Patient was first trialed on Myrbetriq 50 mg I did not notice any improvement in his urinary frequency at night on this  medication.  Patient was then trialed on trialed on Flomax 0.4 mg and had side effects of lightheadedness and dizziness and discontinued the use of this medication.  At his last office visit, the patient was started on trospium chloride once daily and notes that the pelvic pressure he previously was experiencing has now resolved since initiating the medication, but reports that his nocturia continues to persist.  Nonneurologic etiologies of nocturia including undiagnosed sleep apnea, lower extremity swelling, and increased salt intake were previously inquired about.  Patient denies history of sleep apnea, no lower leg extremity swelling is present, but the patient does have a high salt diet.  Additionally, the patient routinely drinks 1.5 glasses of wine at night.  A PVR was performed in the office today and was found to be 6 mL.  Otherwise, the patient denies worsening daytime urinary frequency/urgency, feelings of incomplete bladder emptying, urinary incontinence, dysuria, hematuria, or flank pain.        Review of Systems   Constitutional:  Negative for chills and fever.   HENT:  Negative for ear pain and sore throat.    Eyes:  Negative for pain and visual disturbance.   Respiratory:  Negative for cough and shortness of breath.    Cardiovascular:  Negative for chest pain and palpitations.   Gastrointestinal:  Negative for abdominal pain and vomiting.   Genitourinary:  Positive for frequency (Nocturia 3-5 times a night). Negative for decreased urine volume, difficulty urinating, dysuria, flank pain, hematuria and urgency.   Musculoskeletal:  Negative for arthralgias and back pain.   Skin:  Negative for color change and rash.   Neurological:  Negative for seizures and syncope.   All other systems reviewed and are negative.          AUA SYMPTOM SCORE      Flowsheet Row Most Recent Value   AUA SYMPTOM SCORE    How often have you had a sensation of not emptying your bladder completely after you finished urinating? 2  "(P)     How often have you had to urinate again less than two hours after you finished urinating? 3 (P)     How often have you found you stopped and started again several times when you urinate? 0 (P)     How often have you found it difficult to postpone urination? 0 (P)     How often have you had a weak urinary stream? 2 (P)     How often have you had to push or strain to begin urination? 2 (P)     How many times did you most typically get up to urinate from the time you went to bed at night until the time you got up in the morning? 5 (P)     Quality of Life: If you were to spend the rest of your life with your urinary condition just the way it is now, how would you feel about that? 3 (P)     AUA SYMPTOM SCORE 14 (P)               Vitals  Vitals:    08/07/24 0913   BP: (!) 190/94   BP Location: Left arm   Patient Position: Sitting   Cuff Size: Adult   Pulse: 75   SpO2: 95%   Weight: 68 kg (150 lb)   Height: 5' 8\" (1.727 m)       Physical Exam  Vitals reviewed.   Constitutional:       General: He is not in acute distress.     Appearance: Normal appearance. He is not ill-appearing.   HENT:      Head: Normocephalic and atraumatic.      Nose: Nose normal.   Eyes:      General: No scleral icterus.  Pulmonary:      Effort: No respiratory distress.   Abdominal:      General: Abdomen is flat. There is no distension.      Palpations: Abdomen is soft.      Tenderness: There is no abdominal tenderness.   Musculoskeletal:         General: Normal range of motion.      Cervical back: Normal range of motion.   Skin:     General: Skin is warm.      Coloration: Skin is not jaundiced.   Neurological:      Mental Status: He is alert and oriented to person, place, and time.      Gait: Gait normal.   Psychiatric:         Mood and Affect: Mood normal.         Behavior: Behavior normal.           Past History  Past Medical History:   Diagnosis Date    Arthritis     Chronic sinusitis     Last Assessed:3/26/14    Colon polyp     " Degeneration of cervical intervertebral disc     Last Assessed:3/26/14    Derangement of unspecified medial meniscus due to old tear or injury, left knee     Last Assessed:4/10/14    Erythema migrans (Lyme disease)     Last Assessed:5/22/13    Eustachian tube dysfunction     Last Assessed:9/29/15    HL (hearing loss)     Hyperlipidemia     Hypertension     Osteoarthritis of wrist     Last Assessed:5/13/15    Primary osteoarthritis of left knee     Last Assessed:7/16/14    Spondylosis of cervical region without myelopathy or radiculopathy     Last Assessed:3/31/14    TMJ syndrome     Last Assessed:12/30/14    Trigger finger of left hand     Last Assessed:5/13/15    Trigger finger of right hand     Little finger, middle finger     Social History     Socioeconomic History    Marital status: /Civil Union     Spouse name: None    Number of children: None    Years of education: None    Highest education level: None   Occupational History    Occupation: Retired   Tobacco Use    Smoking status: Former     Types: Cigars     Passive exposure: Past    Smokeless tobacco: Never   Vaping Use    Vaping status: Never Used   Substance and Sexual Activity    Alcohol use: Yes     Alcohol/week: 14.0 standard drinks of alcohol     Types: 14 Glasses of wine per week     Comment: Social drinker/Denies alcohol causing problems    Drug use: No    Sexual activity: Yes     Partners: Female   Other Topics Concern    None   Social History Narrative    Exercises regularly     Social Determinants of Health     Financial Resource Strain: Low Risk  (12/11/2023)    Overall Financial Resource Strain (CARDIA)     Difficulty of Paying Living Expenses: Not hard at all   Food Insecurity: No Food Insecurity (11/14/2019)    Hunger Vital Sign     Worried About Running Out of Food in the Last Year: Never true     Ran Out of Food in the Last Year: Never true   Transportation Needs: No Transportation Needs (12/11/2023)    PRAPARE - Transportation      Lack of Transportation (Medical): No     Lack of Transportation (Non-Medical): No   Physical Activity: Unknown (11/14/2019)    Exercise Vital Sign     Days of Exercise per Week: Patient declined     Minutes of Exercise per Session: Patient declined   Stress: No Stress Concern Present (11/14/2019)    [employer] - Occupational Stress Questionnaire     Feeling of Stress : Not at all   Social Connections: Socially Integrated (11/14/2019)    Social Connection and Isolation Panel [NHANES]     Frequency of Communication with Friends and Family: Three times a week     Frequency of Social Gatherings with Friends and Family: Once a week     Attends Shinto Services: More than 4 times per year     Active Member of Clubs or Organizations: Yes     Attends Club or Organization Meetings: Never     Marital Status:    Intimate Partner Violence: Not At Risk (12/12/2023)    Humiliation, Afraid, Rape, and Kick questionnaire     Fear of Current or Ex-Partner: No     Emotionally Abused: No     Physically Abused: No     Sexually Abused: No   Housing Stability: Not on file     Social History     Tobacco Use   Smoking Status Former    Types: Cigars    Passive exposure: Past   Smokeless Tobacco Never     Family History   Problem Relation Age of Onset    Heart disease Mother         Coronary heart disease    Dementia Mother     Heart disease Father         Coronary heart disease    Lung cancer Paternal Grandmother        The following portions of the patient's history were reviewed and updated as appropriate: allergies, current medications, past medical history, past social history, past surgical history and problem list.    Results  Recent Results (from the past 1 hour(s))   POCT Measure PVR    Collection Time: 08/07/24  9:21 AM   Result Value Ref Range    POST-VOID RESIDUAL VOLUME, ML POC 6 mL   POCT urine dip auto non-scope    Collection Time: 08/07/24  9:26 AM   Result Value Ref Range     COLOR,UA  Yellow     CLARITY,UA Clear     SPECIFIC GRAVITY,UA 1.015      PH,UA 5     LEUKOCYTE ESTERASE,UA Neg     NITRITE,UA Neg     GLUCOSE, UA Neg     KETONES,UA Neg     BILIRUBIN,UA Neg     BLOOD,UA Neg     POCT URINE PROTEIN Neg     SL AMB POCT UROBILINOGEN 0.2    ]  Lab Results   Component Value Date    PSA 1.54 12/13/2023    PSA 1.3 12/05/2022    PSA 1.2 12/02/2021    PSA 1.2 12/01/2020     Lab Results   Component Value Date    GLUCOSE 116 12/30/2019    CALCIUM 8.9 07/03/2024     09/22/2015    K 4.3 07/03/2024    CO2 25 07/03/2024     07/03/2024    BUN 17 07/03/2024    CREATININE 0.80 07/03/2024     Lab Results   Component Value Date    WBC 10.90 (H) 05/29/2024    HGB 14.8 05/29/2024    HCT 45.9 05/29/2024     (H) 05/29/2024     05/29/2024

## 2024-08-28 DIAGNOSIS — N40.1 BPH ASSOCIATED WITH NOCTURIA: ICD-10-CM

## 2024-08-28 DIAGNOSIS — R35.1 BPH ASSOCIATED WITH NOCTURIA: ICD-10-CM

## 2024-08-29 RX ORDER — TROSPIUM CHLORIDE 20 MG/1
20 TABLET, FILM COATED ORAL
Qty: 60 TABLET | Refills: 1 | Status: SHIPPED | OUTPATIENT
Start: 2024-08-29

## 2024-10-22 ENCOUNTER — HOSPITAL ENCOUNTER (EMERGENCY)
Facility: HOSPITAL | Age: 84
Discharge: HOME | End: 2024-10-22
Attending: EMERGENCY MEDICINE | Admitting: EMERGENCY MEDICINE
Payer: COMMERCIAL

## 2024-10-22 ENCOUNTER — APPOINTMENT (EMERGENCY)
Dept: CT IMAGING | Facility: HOSPITAL | Age: 84
End: 2024-10-22
Payer: COMMERCIAL

## 2024-10-22 VITALS
TEMPERATURE: 97.7 F | SYSTOLIC BLOOD PRESSURE: 134 MMHG | DIASTOLIC BLOOD PRESSURE: 78 MMHG | HEART RATE: 66 BPM | OXYGEN SATURATION: 97 % | RESPIRATION RATE: 18 BRPM

## 2024-10-22 DIAGNOSIS — I10 HYPERTENSION: ICD-10-CM

## 2024-10-22 DIAGNOSIS — S00.81XA FACIAL ABRASION, INITIAL ENCOUNTER: ICD-10-CM
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
## 2024-10-22 DIAGNOSIS — S40.211A ABRASION OF RIGHT SHOULDER, INITIAL ENCOUNTER: ICD-10-CM

## 2024-10-22 PROCEDURE — 70486 CT MAXILLOFACIAL W/O DYE: CPT

## 2024-10-22 PROCEDURE — 99285 EMERGENCY DEPT VISIT HI MDM: CPT | Performed by: EMERGENCY MEDICINE

## 2024-10-22 PROCEDURE — 70450 CT HEAD/BRAIN W/O DYE: CPT

## 2024-10-22 PROCEDURE — 72125 CT NECK SPINE W/O DYE: CPT

## 2024-10-22 PROCEDURE — 99284 EMERGENCY DEPT VISIT MOD MDM: CPT

## 2024-10-22 RX ORDER — GINSENG 100 MG
1 CAPSULE ORAL ONCE
Status: COMPLETED | OUTPATIENT
Start: 2024-10-22 | End: 2024-10-22

## 2024-10-22 RX ADMIN — BACITRACIN 1 SMALL APPLICATION: 500 OINTMENT TOPICAL at 13:17

## 2024-10-22 NOTE — ED NOTES
Wound care performed. Cleaned wound with normal saline, pat dry, applied bacitracin, cheryl padding, and dressing. Educated patient on how to do this at home. Pt verbalized understanding.      Geri Moraes RN  10/22/24 8920

## 2024-10-22 NOTE — ED PROVIDER NOTES
Emergency Department Trauma Note  Patrick Frey 84 y.o. male MRN: [id number]  Unit/Bed#: ED TR13/TR13B Encounter: [id number]      Trauma Alert: Trauma Acuity: Trauma Evaluation  Model of Arrival: Mode of Arrival: Direct from scene via    Trauma Team: Current Providers  Attending Provider: Bryn Amato DO  Registered Nurse: Geri Moraes, RN  Registered Nurse: Cortney Preciado, RN  Consultants:     None      History of Present Illness     Chief Complaint:   Chief Complaint   Patient presents with    Fall     Pt walking across the street and slipped on wet mulch. Pt struck face and shoulder on blacktop. +thinners -LOC     HPI:  Patrick Frey is a 84 y.o. male who presents with facial injury and skin tear to right shoulder after slip and fall on fresh mulch landing on blacktop.  Patient denies loss of consciousness.  Patient is on a low-dose aspirin.  Patient denies headache or visual disturbance.  Patient denies any new numbness or tingling..  Mechanism:Details of Incident: trip and fall on blacktop Injury Date: 10/22/24        HPI  Review of Systems    Historical Information     Immunizations:   Immunization History   Administered Date(s) Administered    COVID-19 PFIZER VACCINE 0.3 ML IM 01/19/2021, 02/07/2021, 08/16/2021    COVID-19 Pfizer Vac BIVALENT Nikita-sucrose 12 Yr+ IM 09/14/2022, 05/20/2023    COVID-19 Pfizer mRNA vacc PF nikita-sucrose 12 yr and older (Comirnaty) 10/16/2023, 08/07/2024    COVID-19 Pfizer vac (Nikita-sucrose, gray cap) 12 yr+ IM 03/28/2022    INFLUENZA 08/16/2021, 09/14/2022, 10/16/2023    Influenza Split High Dose Preservative Free IM 09/20/2012, 10/07/2013, 09/30/2014, 09/22/2015, 09/07/2016, 10/04/2017    Influenza, high dose seasonal 0.7 mL 09/25/2018, 10/15/2019, 09/29/2020    Pneumococcal Conjugate 13-Valent 11/21/2016    Pneumococcal Polysaccharide PPV23 09/19/2011, 05/17/2016    Respiratory Syncytial Virus Vaccine (Recombinant, Adjuvanted) 12/13/2023    TD (adult) Preservative  Free 05/20/2015, 12/20/2016    Td (adult), adsorbed 05/25/2005    Tdap 12/30/2019    Zoster 12/03/2017    Zoster Vaccine Recombinant 04/08/2019, 06/10/2019       Past Medical History:   Diagnosis Date    Arthritis     Chronic sinusitis     Last Assessed:3/26/14    Colon polyp     Degeneration of cervical intervertebral disc     Last Assessed:3/26/14    Derangement of unspecified medial meniscus due to old tear or injury, left knee     Last Assessed:4/10/14    Erythema migrans (Lyme disease)     Last Assessed:5/22/13    Eustachian tube dysfunction     Last Assessed:9/29/15    HL (hearing loss)     Hyperlipidemia     Hypertension     Osteoarthritis of wrist     Last Assessed:5/13/15    Primary osteoarthritis of left knee     Last Assessed:7/16/14    Spondylosis of cervical region without myelopathy or radiculopathy     Last Assessed:3/31/14    TMJ syndrome     Last Assessed:12/30/14    Trigger finger of left hand     Last Assessed:5/13/15    Trigger finger of right hand     Little finger, middle finger       Family History   Problem Relation Age of Onset    Heart disease Mother         Coronary heart disease    Dementia Mother     Heart disease Father         Coronary heart disease    Lung cancer Paternal Grandmother      Past Surgical History:   Procedure Laterality Date    CATARACT EXTRACTION      COLONOSCOPY      KNEE SURGERY      NEUROPLASTY / TRANSPOSITION MEDIAN NERVE AT CARPAL TUNNEL      NM NEUROPLASTY &/TRANSPOS MEDIAN NRV CARPAL TUNNE Left 4/25/2019    Procedure: RELEASE CARPAL TUNNEL OPEN;  Surgeon: Carson Poole MD;  Location: [facility];  Service: Orthopedics    NM TENDON SHEATH INCISION Left 4/25/2019    Procedure: RELEASE TRIGGER FINGER - LEFT SMALL;  Surgeon: Carson Poole MD;  Location: [facility];  Service: Orthopedics    SHOULDER ARTHROSCOPY Bilateral     SHOULDER SURGERY      TONSILLECTOMY      TONSILLECTOMY       Social History     Tobacco Use    Smoking status: Former     Types: Cigars      Passive exposure: Past    Smokeless tobacco: Never   Vaping Use    Vaping status: Never Used   Substance Use Topics    Alcohol use: Yes     Alcohol/week: 14.0 standard drinks of alcohol     Types: 14 Glasses of wine per week     Comment: Social drinker/Denies alcohol causing problems    Drug use: No     E-Cigarette/Vaping    E-Cigarette Use Never User      E-Cigarette/Vaping Substances    Nicotine No     THC No     CBD No     Flavoring No     Other No     Unknown No        Family History: non-contributory    Meds/Allergies   Prior to Admission Medications   Prescriptions Last Dose Informant Patient Reported? Taking?   Silodosin 4 MG CAPS   No No   Sig: Take 1 capsule (4 mg total) by mouth with dinner once daily   aspirin 81 MG tablet  Self Yes No   Sig: Take 1 tablet by mouth daily    diclofenac sodium (VOLTAREN) 1 %  Self No No   Sig: Apply 2 g topically 4 (four) times a day   ezetimibe (ZETIA) 10 mg tablet   No No   Sig: Take 1 tablet (10 mg total) by mouth daily   famotidine (PEPCID) 40 MG tablet   No No   Sig: Take 1 tablet (40 mg total) by mouth daily at bedtime   lisinopril (ZESTRIL) 20 mg tablet   No No   Sig: Take 1 tablet (20 mg total) by mouth daily   sildenafil (REVATIO) 20 mg tablet  Self No No   Sig: Take 3-5 tablets daily as directed   trospium chloride (SANCTURA) 20 mg tablet   No No   Sig: Take 1 tablet (20 mg total) by mouth daily at bedtime      Facility-Administered Medications: None       Allergies   Allergen Reactions    Azithromycin Other (See Comments)     Cannot recall allergy or reaction    Meperidine Other (See Comments)     Cannot recall allergy or reaction    Pseudoephedrine Other (See Comments)     Cannot recall allergy or reaction       PHYSICAL EXAM    PE limited by: nothing    Objective   Vitals:   First set: Temperature: 97.7 °F (36.5 °C) (10/22/24 1228)  Pulse: 78 (10/22/24 1228)  Respirations: 18 (10/22/24 1228)  Blood Pressure: (!) 184/143 (10/22/24 1228)  SpO2: 98 % (10/22/24  1228)    Primary Survey:   (A) Airway: patent  (B) Breathing: CTA B/L  (C) Circulation: Pulses:   normal  (D) Disabliity:  GCS Total:  15  (E) Expose:  Completed    Secondary Survey: (Click on Physical Exam tab above)  Physical Exam  Vitals and nursing note reviewed.   Constitutional:       General: He is not in acute distress.     Appearance: He is well-developed.   HENT:      Head: Normocephalic. Abrasion present. No raccoon eyes or Bryant's sign.        Right Ear: External ear normal. No hemotympanum.      Left Ear: External ear normal. No hemotympanum.      Nose: No nasal deformity.      Right Nostril: No septal hematoma.      Left Nostril: No septal hematoma.   Eyes:      Extraocular Movements: Extraocular movements intact.      Conjunctiva/sclera: Conjunctivae normal.      Pupils: Pupils are equal, round, and reactive to light.   Neck:      Trachea: No tracheal deviation.   Cardiovascular:      Rate and Rhythm: Normal rate and regular rhythm.      Heart sounds: Normal heart sounds. No murmur heard.  Pulmonary:      Effort: Pulmonary effort is normal. No respiratory distress.      Breath sounds: Normal breath sounds.   Chest:      Chest wall: No tenderness.   Abdominal:      General: There is no distension.      Palpations: Abdomen is soft.      Tenderness: There is no abdominal tenderness. There is no guarding or rebound.   Musculoskeletal:         General: No tenderness. Normal range of motion.      Right shoulder: No laceration or tenderness. Normal range of motion.        Arms:       Cervical back: Normal range of motion.   Skin:     General: Skin is warm and dry.   Neurological:      Mental Status: He is alert and oriented to person, place, and time.      Deep Tendon Reflexes: Reflexes are normal and symmetric.         Cervical spine cleared by clinical criteria? No (imaging required)      Invasive Devices       None                   Lab Results:   Results Reviewed       None                   Imaging  Studies:   Direct to CT: Yes  TRAUMA - CT head wo contrast   Final Result by Rj Man MD (10/22 1352)      1.  No acute intracranial abnormality.   2.  No acute facial fracture.                  Workstation performed: [id number]         TRAUMA - CT spine cervical wo contrast   Final Result by Rj Man MD (10/22 6761)      No cervical spine fracture or traumatic malalignment.                  Workstation performed: [id number]         TRAUMA - CT facial bones wo contrast   Final Result by Rj Man MD (10/22 1352)      1.  No acute intracranial abnormality.   2.  No acute facial fracture.                  Workstation performed: [id number]               Procedures  Procedures         ED Course  ED Course as of 10/22/24 1410   Tue Oct 22, 2024   1250 Breath sounds equal. No crepitus or chest wall tenderness with compression over the chest. No pain or instability with compression over the pelvis. No bedside imaging required. Patient is stable to proceed to CT scan.             Medical Decision Making  The plan is to obtain a CT of the head and cervical spine to rule out clinically significant injury such as skull fracture, epidural or subdural hematoma.  Will also rule out cervical spine fracture or dislocation.  Also obtain CT of the face to rule out facial fracture.    I debrided a small area of skin from the left shoulder as well as the inferior aspect of the abrasion to the left face.    Amount and/or Complexity of Data Reviewed  Radiology: ordered.    Risk  OTC drugs.                Disposition  Priority One Transfer: No  Final diagnoses:   Fall from slip, trip, or stumble, initial encounter   Facial abrasion, initial encounter   Abrasion of right shoulder, initial encounter   Hypertension     Time reflects when diagnosis was documented in both MDM as applicable and the Disposition within this note       Time User Action Codes Description Comment    10/22/2024  2:03 PM [place]  Bryn WALLACE Add [W01.0XXA] Fall from slip, trip, or stumble, initial encounter     10/22/2024  2:03 PM Bryn Amato Add [S00.81XA] Facial abrasion, initial encounter     10/22/2024  2:03 PM Bryn Amato Add [S40.211A] Abrasion of right shoulder, initial encounter     10/22/2024  2:04 PM Bryn Amato Add [I10] Hypertension           ED Disposition       ED Disposition   Discharge    Condition   Stable    Date/Time   Tue Oct 22, 2024  2:03 PM    Comment   Patrick Frey discharge to home/self care.                   Follow-up Information       Follow up With Specialties Details Why Contact Info Additional Information    Raymon Marte, DO Family Medicine Schedule an appointment as soon as possible for a visit  As needed, For further evaluation [address] [place] [id number]  [phone]        [facility] Emergency Department Emergency Medicine Go to  If symptoms worsen [address] [facility] [address] [phone] [facility] Emergency Department, [address] [place], [place] [address]          Current Discharge Medication List        CONTINUE these medications which have NOT CHANGED    Details   aspirin 81 MG tablet Take 1 tablet by mouth daily       diclofenac sodium (VOLTAREN) 1 % Apply 2 g topically 4 (four) times a day  Qty: 1 Tube, Refills: 3    Associated Diagnoses: Arthritis of right acromioclavicular joint; Traumatic complete tear of left rotator cuff, initial encounter      ezetimibe (ZETIA) 10 mg tablet Take 1 tablet (10 mg total) by mouth daily  Qty: 90 tablet, Refills: 1    Associated Diagnoses: Hyperlipidemia, unspecified hyperlipidemia type      famotidine (PEPCID) 40 MG tablet Take 1 tablet (40 mg total) by mouth daily at bedtime  Qty: 90 tablet, Refills: 1    Associated Diagnoses: Laryngopharyngeal reflux (LPR)      lisinopril (ZESTRIL) 20 mg tablet Take 1 tablet (20 mg total) by mouth daily  Qty:  90 tablet, Refills: 1    Associated Diagnoses: Essential hypertension      sildenafil (REVATIO) 20 mg tablet Take 3-5 tablets daily as directed  Qty: 50 tablet, Refills: 3    Associated Diagnoses: Erectile dysfunction, unspecified erectile dysfunction type      Silodosin 4 MG CAPS Take 1 capsule (4 mg total) by mouth with dinner once daily  Qty: 30 capsule, Refills: 3    Associated Diagnoses: BPH associated with nocturia      trospium chloride (SANCTURA) 20 mg tablet Take 1 tablet (20 mg total) by mouth daily at bedtime  Qty: 60 tablet, Refills: 1    Associated Diagnoses: BPH associated with nocturia           No discharge procedures on file.    PDMP Review       None            ED Provider  Electronically Signed by           Bryn Amato,   10/22/24 1409       Bryn Amato,   10/22/24 1410

## 2024-11-11 ENCOUNTER — PATIENT MESSAGE (OUTPATIENT)
Dept: FAMILY MEDICINE CLINIC | Facility: CLINIC | Age: 84
End: 2024-11-11

## 2024-11-11 DIAGNOSIS — I10 ESSENTIAL HYPERTENSION: ICD-10-CM

## 2024-11-11 RX ORDER — LISINOPRIL 20 MG/1
20 TABLET ORAL DAILY
Qty: 90 TABLET | Refills: 1 | Status: SHIPPED | OUTPATIENT
Start: 2024-11-11 | End: 2024-11-15 | Stop reason: SDUPTHER

## 2024-11-12 ENCOUNTER — OFFICE VISIT (OUTPATIENT)
Dept: UROLOGY | Facility: AMBULATORY SURGERY CENTER | Age: 84
End: 2024-11-12
Payer: COMMERCIAL

## 2024-11-12 VITALS
OXYGEN SATURATION: 92 % | HEART RATE: 85 BPM | DIASTOLIC BLOOD PRESSURE: 84 MMHG | BODY MASS INDEX: 22.66 KG/M2 | SYSTOLIC BLOOD PRESSURE: 144 MMHG | WEIGHT: 149 LBS | RESPIRATION RATE: 18 BRPM

## 2024-11-12 DIAGNOSIS — R35.1 BPH ASSOCIATED WITH NOCTURIA: Primary | ICD-10-CM

## 2024-11-12 DIAGNOSIS — N40.1 BPH ASSOCIATED WITH NOCTURIA: Primary | ICD-10-CM

## 2024-11-12 LAB — POST-VOID RESIDUAL VOLUME, ML POC: 27 ML

## 2024-11-12 PROCEDURE — 99213 OFFICE O/P EST LOW 20 MIN: CPT

## 2024-11-12 PROCEDURE — 51798 US URINE CAPACITY MEASURE: CPT

## 2024-11-12 NOTE — PROGRESS NOTES
11/12/2024      Assessment and Plan    84 y.o. male managed by Dr. Viveros    BPH associated with nocturia  AUA symptom score 14  PVR performed in the office today found to be 27 mL  Patient has been previously trialed on Flomax, which he experienced side effects of lightheadedness.  Patient was previously trialed on Myrbetriq 50 mg and noticed no change in his urinary frequency or nocturia.  Patient is currently on a combination of trospium chloride 20 mg daily and Rapaflo 4 mg daily for treatment of his lower urinary tract symptoms.  Patient is more satisfied with his voiding pattern on a combination of these medications and compared to our previous efforts with other medications.  Patient reports that he still has frequent urination at night, but overall his lower urinary tract symptoms have improved.  Patient reports that the only side effect he has experienced from the Rapaflo initiated at his last office visit was retrograde ejaculation, but is unbothered by it at this time.  We discussed that we could potentially trial switching this medication for alfuzosin in the future if he becomes more bothered by this.  We discussed that the patient could pursue a in office cystoscopy for further bladder outlet obstruction, but patient defers undergoing cystoscopy at this time.  Patient will continue on trospium chloride 20 mg daily and Rapaflo 4 mg daily for treatment of his lower urinary tract symptoms and follow-up in 6 months to reassess lower urinary tract symptoms.        History of Present Illness  Patrick Frey is a 84 y.o. male here for evaluation of BPH with nocturia/lower urinary tract symptoms.  Patient was last seen in the office on 8/7/2024.  At her last office visit, the patient noted that his most bothersome lower urinary tract symptom with his nocturia 3-5 times a night.  Patient was previously trialed on Flomax 0.4 mg and experienced side effects of dizziness and lightheadedness.  Patient was then  transition to Myrbetriq 50 mg which did not provide any benefit for his frequent nocturia.  Patient was then transition to trospium chloride 20 mg daily and reported relief of his pelvic pressure, but no change in his nocturia.  At our last office visit, we initiated him on a combination of trospium chloride 20 mg and Rapaflo 4 mg daily.  Today, the patient reports some degree of improvement in his nocturia since initiating therapy with the Rapaflo.  However, patient states that he has been experiencing side effect of retrograde ejaculation.  The patient himself is unbothered with the change in his ejaculations, but notes that his significant other is.  Patient notes that previously would have to urinate and then return to the bathroom and 25 to 30 minutes and then void again, but now only uses the restroom every 2-3 hours.  AUA symptom score 14.  PVR performed the office today found to be 27 mL.  Otherwise, the patient denies dysuria, hematuria, flank pain, worsening urinary urgency, urinary incontinence, or feelings of incomplete bladder emptying.        Review of Systems   Constitutional:  Negative for chills and fever.   HENT:  Negative for ear pain and sore throat.    Eyes:  Negative for pain and visual disturbance.   Respiratory:  Negative for cough and shortness of breath.    Cardiovascular:  Negative for chest pain and palpitations.   Gastrointestinal:  Negative for abdominal pain and vomiting.   Genitourinary:  Positive for frequency. Negative for decreased urine volume, difficulty urinating, dysuria, flank pain, hematuria and urgency.   Musculoskeletal:  Negative for arthralgias and back pain.   Skin:  Negative for color change and rash.   Neurological:  Negative for seizures and syncope.   All other systems reviewed and are negative.          AUA SYMPTOM SCORE      Flowsheet Row Most Recent Value   AUA SYMPTOM SCORE    How often have you had a sensation of not emptying your bladder completely after you  finished urinating? 1 (P)     How often have you had to urinate again less than two hours after you finished urinating? 1 (P)     How often have you found you stopped and started again several times when you urinate? 0 (P)     How often have you found it difficult to postpone urination? 5 (P)     How often have you had a weak urinary stream? 1 (P)     How often have you had to push or strain to begin urination? 1 (P)     How many times did you most typically get up to urinate from the time you went to bed at night until the time you got up in the morning? 5 (P)     Quality of Life: If you were to spend the rest of your life with your urinary condition just the way it is now, how would you feel about that? 3 (P)     AUA SYMPTOM SCORE 14 (P)               Vitals  Vitals:    11/12/24 0932   BP: 144/84   Pulse: 85   Resp: 18   SpO2: 92%   Weight: 67.6 kg (149 lb)       Physical Exam  Vitals reviewed.   Constitutional:       General: He is not in acute distress.     Appearance: Normal appearance. He is not ill-appearing.   HENT:      Head: Normocephalic and atraumatic.      Nose: Nose normal.   Eyes:      General: No scleral icterus.  Pulmonary:      Effort: No respiratory distress.   Abdominal:      General: Abdomen is flat. There is no distension.      Palpations: Abdomen is soft.      Tenderness: There is no abdominal tenderness.   Musculoskeletal:         General: Normal range of motion.      Cervical back: Normal range of motion.   Skin:     General: Skin is warm.      Coloration: Skin is not jaundiced.   Neurological:      Mental Status: He is alert and oriented to person, place, and time.      Gait: Gait normal.   Psychiatric:         Mood and Affect: Mood normal.         Behavior: Behavior normal.           Past History  Past Medical History:   Diagnosis Date    Arthritis     Chronic sinusitis     Last Assessed:3/26/14    Colon polyp     Degeneration of cervical intervertebral disc     Last Assessed:3/26/14     Derangement of unspecified medial meniscus due to old tear or injury, left knee     Last Assessed:4/10/14    Erythema migrans (Lyme disease)     Last Assessed:5/22/13    Eustachian tube dysfunction     Last Assessed:9/29/15    HL (hearing loss)     Hyperlipidemia     Hypertension     Osteoarthritis of wrist     Last Assessed:5/13/15    Primary osteoarthritis of left knee     Last Assessed:7/16/14    Spondylosis of cervical region without myelopathy or radiculopathy     Last Assessed:3/31/14    TMJ syndrome     Last Assessed:12/30/14    Trigger finger of left hand     Last Assessed:5/13/15    Trigger finger of right hand     Little finger, middle finger     Social History     Socioeconomic History    Marital status: /Civil Union     Spouse name: None    Number of children: None    Years of education: None    Highest education level: None   Occupational History    Occupation: Retired   Tobacco Use    Smoking status: Former     Types: Cigars     Passive exposure: Past    Smokeless tobacco: Never   Vaping Use    Vaping status: Never Used   Substance and Sexual Activity    Alcohol use: Yes     Alcohol/week: 14.0 standard drinks of alcohol     Types: 14 Glasses of wine per week     Comment: Social drinker/Denies alcohol causing problems    Drug use: No    Sexual activity: Yes     Partners: Female   Other Topics Concern    None   Social History Narrative    Exercises regularly     Social Determinants of Health     Financial Resource Strain: Low Risk  (12/11/2023)    Overall Financial Resource Strain (CARDIA)     Difficulty of Paying Living Expenses: Not hard at all   Food Insecurity: No Food Insecurity (11/14/2019)    Hunger Vital Sign     Worried About Running Out of Food in the Last Year: Never true     Ran Out of Food in the Last Year: Never true   Transportation Needs: No Transportation Needs (12/11/2023)    PRAPARE - Transportation     Lack of Transportation (Medical): No     Lack of Transportation  (Non-Medical): No   Physical Activity: Unknown (11/14/2019)    Exercise Vital Sign     Days of Exercise per Week: Patient declined     Minutes of Exercise per Session: Patient declined   Stress: No Stress Concern Present (11/14/2019)    [place] [employer] - Occupational Stress Questionnaire     Feeling of Stress : Not at all   Social Connections: Socially Integrated (11/14/2019)    Social Connection and Isolation Panel [NHANES]     Frequency of Communication with Friends and Family: Three times a week     Frequency of Social Gatherings with Friends and Family: Once a week     Attends [employer] Services: More than 4 times per year     Active Member of Clubs or Organizations: Yes     Attends Club or Organization Meetings: Never     Marital Status:    Intimate Partner Violence: Not At Risk (12/12/2023)    Humiliation, Afraid, Rape, and Kick questionnaire     Fear of Current or Ex-Partner: No     Emotionally Abused: No     Physically Abused: No     Sexually Abused: No   Housing Stability: Not on file     Social History     Tobacco Use   Smoking Status Former    Types: Cigars    Passive exposure: Past   Smokeless Tobacco Never     Family History   Problem Relation Age of Onset    Heart disease Mother         Coronary heart disease    Dementia Mother     Heart disease Father         Coronary heart disease    Lung cancer Paternal Grandmother        The following portions of the patient's history were reviewed and updated as appropriate: allergies, current medications, past medical history, past social history, past surgical history and problem list.    Results  No results found for this or any previous visit (from the past 1 hour(s)).  ]  Lab Results   Component Value Date    PSA 1.54 12/13/2023    PSA 1.3 12/05/2022    PSA 1.2 12/02/2021    PSA 1.2 12/01/2020     Lab Results   Component Value Date    GLUCOSE 116 12/30/2019    CALCIUM 8.9 07/03/2024     09/22/2015    K 4.3 07/03/2024     CO2 25 07/03/2024     07/03/2024    BUN 17 07/03/2024    CREATININE 0.80 07/03/2024     Lab Results   Component Value Date    WBC 10.90 (H) 05/29/2024    HGB 14.8 05/29/2024    HCT 45.9 05/29/2024     (H) 05/29/2024     05/29/2024

## 2024-11-12 NOTE — ASSESSMENT & PLAN NOTE
AUA symptom score 14  PVR performed in the office today found to be 27 mL  Patient has been previously trialed on Flomax, which he experienced side effects of lightheadedness.  Patient was previously trialed on Myrbetriq 50 mg and noticed no change in his urinary frequency or nocturia.  Patient is currently on a combination of trospium chloride 20 mg daily and Rapaflo 4 mg daily for treatment of his lower urinary tract symptoms.  Patient is more satisfied with his voiding pattern on a combination of these medications and compared to our previous efforts with other medications.  Patient reports that he still has frequent urination at night, but overall his lower urinary tract symptoms have improved.  Patient reports that the only side effect he has experienced from the Rapaflo initiated at his last office visit was retrograde ejaculation, but is unbothered by it at this time.  We discussed that we could potentially trial switching this medication for alfuzosin in the future if he becomes more bothered by this.  We discussed that the patient could pursue a in office cystoscopy for further bladder outlet obstruction, but patient defers undergoing cystoscopy at this time.  Patient will continue on trospium chloride 20 mg daily and Rapaflo 4 mg daily for treatment of his lower urinary tract symptoms and follow-up in 6 months to reassess lower urinary tract symptoms.

## 2024-11-15 DIAGNOSIS — I10 ESSENTIAL HYPERTENSION: ICD-10-CM

## 2024-11-15 RX ORDER — LISINOPRIL 20 MG/1
20 TABLET ORAL DAILY
Qty: 90 TABLET | Refills: 3 | Status: SHIPPED | OUTPATIENT
Start: 2024-11-15

## 2024-11-15 NOTE — TELEPHONE ENCOUNTER
Medication: Lisinopril     Dose/Frequency: 20mg 1 tab day     Quantity: 90    Pharmacy: [facility]     Office:   [x] PCP/Provider -   [] Speciality/Provider -     Does the patient have enough for 3 days?   [x] Yes   [] No - Send as HP to POD       Patient would like three refills

## 2024-11-18 DIAGNOSIS — R35.1 BPH ASSOCIATED WITH NOCTURIA: ICD-10-CM

## 2024-11-18 DIAGNOSIS — N40.1 BPH ASSOCIATED WITH NOCTURIA: ICD-10-CM

## 2024-11-18 RX ORDER — SILODOSIN 4 MG/1
CAPSULE ORAL
Qty: 30 CAPSULE | Refills: 3 | Status: SHIPPED | OUTPATIENT
Start: 2024-11-18

## 2024-12-13 ENCOUNTER — OFFICE VISIT (OUTPATIENT)
Dept: OBGYN CLINIC | Facility: CLINIC | Age: 84
End: 2024-12-13
Payer: COMMERCIAL

## 2024-12-13 VITALS
HEIGHT: 68 IN | DIASTOLIC BLOOD PRESSURE: 66 MMHG | WEIGHT: 156 LBS | BODY MASS INDEX: 23.64 KG/M2 | SYSTOLIC BLOOD PRESSURE: 120 MMHG

## 2024-12-13 DIAGNOSIS — G56.03 CARPAL TUNNEL SYNDROME, BILATERAL: Primary | ICD-10-CM

## 2024-12-13 PROCEDURE — 20526 THER INJECTION CARP TUNNEL: CPT | Performed by: ORTHOPAEDIC SURGERY

## 2024-12-13 PROCEDURE — 99213 OFFICE O/P EST LOW 20 MIN: CPT | Performed by: ORTHOPAEDIC SURGERY

## 2024-12-13 RX ORDER — BETAMETHASONE SODIUM PHOSPHATE AND BETAMETHASONE ACETATE 3; 3 MG/ML; MG/ML
6 INJECTION, SUSPENSION INTRA-ARTICULAR; INTRALESIONAL; INTRAMUSCULAR; SOFT TISSUE
Status: COMPLETED | OUTPATIENT
Start: 2024-12-13 | End: 2024-12-13

## 2024-12-13 RX ORDER — LIDOCAINE HYDROCHLORIDE 10 MG/ML
1 INJECTION, SOLUTION INFILTRATION; PERINEURAL
Status: COMPLETED | OUTPATIENT
Start: 2024-12-13 | End: 2024-12-13

## 2024-12-13 RX ADMIN — LIDOCAINE HYDROCHLORIDE 1 ML: 10 INJECTION, SOLUTION INFILTRATION; PERINEURAL at 08:30

## 2024-12-13 RX ADMIN — BETAMETHASONE SODIUM PHOSPHATE AND BETAMETHASONE ACETATE 6 MG: 3; 3 INJECTION, SUSPENSION INTRA-ARTICULAR; INTRALESIONAL; INTRAMUSCULAR; SOFT TISSUE at 08:30

## 2024-12-13 NOTE — PROGRESS NOTES
Assessment/Plan:  1. Carpal tunnel syndrome, bilateral  Hand/upper extremity injection          Subjective history, clinical exam, and diagnostic studies reviewed with patient  Diagnosis discussed - residual symptoms related to CTS  Treatment options discussed which include nonoperative and operative management.  We discussed use of splinting, therapy, activity modification, use of NSAIDs (oral and topical), and injections.  We discussed risks and benefits of each and well as expected reasonable outcomes.  Surgery can be considered following unsuccessful treatment with nonoperative management.    The patient was given the opportunity to ask questions.  Questions were answered to the patient's satisfaction.  The patient decided to move forward with bilateral CSI via shared decision making.  Follow up as needed- after he returns from [place] this spring    Hand/upper extremity injection: bilateral carpal tunnel  [place] Protocol:  Consent: Verbal consent obtained.  Risks and benefits: risks, benefits and alternatives were discussed  Consent given by: patient  Patient understanding: patient states understanding of the procedure being performed  Patient identity confirmed: verbally with patient  Supporting Documentation  Indications: therapeutic and diagnostic   Procedure Details  Condition:carpal tunnel syndrome Site: bilateral carpal tunnel   Needle size: 25 G  Ultrasound guidance: no  Approach: volar  Medications (Right): 1 mL lidocaine 1 %; 6 mg betamethasone acetate-betamethasone sodium phosphate 6 (3-3) mg/mLMedications (Left): 1 mL lidocaine 1 %; 6 mg betamethasone acetate-betamethasone sodium phosphate 6 (3-3) mg/mL   Patient tolerance: patient tolerated the procedure well with no immediate complications  Dressing:  Sterile dressing applied            cc: tingling in both hands    Subjective:   Patrick Frey is a right hand dominant 84 y.o. male who presents today for evaluation for bilateral hand tingling.  He reports having consistent tingling. He does wear night time splints that help with reducing the tingling. He reports having 2 carpal tunnel releases for the right and 1 carpal tunnel release in the left. The carpal tunnel release in the left was with Dr. Poole in April 2019.       Review of Systems   Constitutional:  Negative for chills and fever.   HENT:  Negative for ear pain and sore throat.    Eyes:  Negative for pain and visual disturbance.   Respiratory:  Negative for cough and shortness of breath.    Cardiovascular:  Negative for chest pain and palpitations.   Gastrointestinal:  Negative for abdominal pain and vomiting.   Genitourinary:  Negative for dysuria and hematuria.   Musculoskeletal:  Negative for arthralgias and back pain.   Skin:  Negative for color change and rash.   Neurological:  Negative for seizures and syncope.   All other systems reviewed and are negative.        Past Medical History:   Diagnosis Date    Arthritis     Chronic sinusitis     Last Assessed:3/26/14    Colon polyp     Degeneration of cervical intervertebral disc     Last Assessed:3/26/14    Derangement of unspecified medial meniscus due to old tear or injury, left knee     Last Assessed:4/10/14    Erythema migrans (Lyme disease)     Last Assessed:5/22/13    Eustachian tube dysfunction     Last Assessed:9/29/15    HL (hearing loss)     Hyperlipidemia     Hypertension     Osteoarthritis of wrist     Last Assessed:5/13/15    Primary osteoarthritis of left knee     Last Assessed:7/16/14    Spondylosis of cervical region without myelopathy or radiculopathy     Last Assessed:3/31/14    TMJ syndrome     Last Assessed:12/30/14    Trigger finger of left hand     Last Assessed:5/13/15    Trigger finger of right hand     Little finger, middle finger       Past Surgical History:   Procedure Laterality Date    CATARACT EXTRACTION      COLONOSCOPY      KNEE SURGERY      NEUROPLASTY / TRANSPOSITION MEDIAN NERVE AT CARPAL TUNNEL      [place]  NEUROPLASTY &/TRANSPOS MEDIAN NRV CARPAL TUNNE Left 4/25/2019    Procedure: RELEASE CARPAL TUNNEL OPEN;  Surgeon: Carson Poole MD;  Location: [facility];  Service: Orthopedics    [place] TENDON SHEATH INCISION Left 4/25/2019    Procedure: RELEASE TRIGGER FINGER - LEFT SMALL;  Surgeon: Carson Poole MD;  Location: [facility];  Service: Orthopedics    SHOULDER ARTHROSCOPY Bilateral     SHOULDER SURGERY      TONSILLECTOMY      TONSILLECTOMY         Family History   Problem Relation Age of Onset    Heart disease Mother         Coronary heart disease    Dementia Mother     Heart disease Father         Coronary heart disease    Lung cancer Paternal Grandmother        Social History     Occupational History    Occupation: Retired   Tobacco Use    Smoking status: Former     Types: Cigars     Passive exposure: Past    Smokeless tobacco: Never   Vaping Use    Vaping status: Never Used   Substance and Sexual Activity    Alcohol use: Yes     Alcohol/week: 14.0 standard drinks of alcohol     Types: 14 Glasses of wine per week     Comment: Social drinker/Denies alcohol causing problems    Drug use: No    Sexual activity: Yes     Partners: Female         Current Outpatient Medications:     aspirin 81 MG tablet, Take 1 tablet by mouth daily , Disp: , Rfl:     diclofenac sodium (VOLTAREN) 1 %, Apply 2 g topically 4 (four) times a day, Disp: 1 Tube, Rfl: 3    ezetimibe (ZETIA) 10 mg tablet, Take 1 tablet (10 mg total) by mouth daily, Disp: 90 tablet, Rfl: 1    famotidine (PEPCID) 40 MG tablet, Take 1 tablet (40 mg total) by mouth daily at bedtime, Disp: 90 tablet, Rfl: 1    lisinopril (ZESTRIL) 20 mg tablet, Take 1 tablet (20 mg total) by mouth daily, Disp: 90 tablet, Rfl: 3    sildenafil (REVATIO) 20 mg tablet, Take 3-5 tablets daily as directed, Disp: 50 tablet, Rfl: 3    Silodosin 4 MG CAPS, Take 1 capsule (4 mg total) by mouth with dinner once daily, Disp: 30 capsule, Rfl: 3    trospium chloride (SANCTURA) 20 mg tablet,  Take 1 tablet (20 mg total) by mouth daily at bedtime, Disp: 60 tablet, Rfl: 1    Allergies   Allergen Reactions    Azithromycin Other (See Comments)     Cannot recall allergy or reaction    Meperidine Other (See Comments)     Cannot recall allergy or reaction    Pseudoephedrine Other (See Comments)     Cannot recall allergy or reaction       Objective:  Vitals:    12/13/24 0823   BP: 120/66       The patient was awake, alert, and oriented to person, place, and time.  No acute distress.  Normocephalic.  EOMI.  Mucous membranes moist.  Normal respiratory effort.    Examination of the affected extremity was compared to the unaffected extremity.  Skin was intact.  No swelling or ecchymosis.  No deformity.  Hand and fingers were warm and well-perfused.  Capillary refill was brisk.  Full active range of motion of the elbows, forearms, wrists, and fingers.      Sensation was not decreased in the median nerve distribution.  Sensation was not decreased in the ulnar nerve distribution.  There was not APB atrophy.  There was no intrinsic atrophy. Tinel's at the wrist was Negative. Tinel's at the elbow was Negative. Wrist flexion compression was negative. Evaluation for lacertus syndrome was Negative.      Imaging/Diagnostic Studies:    No images at today's visit.        This document was created using speech voice recognition software.   Grammatical errors, random word insertions, pronoun errors, and incomplete sentences are an occasional consequence of this system due to software limitations, ambient noise, and hardware issues.   Any formal questions or concerns about content, text, or information contained within the body of this dictation should be directly addressed to the provider for clarification.    Scribe Attestation      I,:  Abisai Dorsey am acting as a scribe while in the presence of the attending physician.:       I,:  Radha Maher MD personally performed the services described in this documentation     as scribed in my presence.:

## 2024-12-16 DIAGNOSIS — N40.1 BPH ASSOCIATED WITH NOCTURIA: ICD-10-CM

## 2024-12-16 DIAGNOSIS — R35.1 BPH ASSOCIATED WITH NOCTURIA: ICD-10-CM

## 2024-12-16 DIAGNOSIS — K21.9 LARYNGOPHARYNGEAL REFLUX (LPR): ICD-10-CM

## 2024-12-16 RX ORDER — TROSPIUM CHLORIDE 20 MG/1
20 TABLET, FILM COATED ORAL
Qty: 60 TABLET | Refills: 1 | Status: SHIPPED | OUTPATIENT
Start: 2024-12-16

## 2024-12-17 RX ORDER — FAMOTIDINE 40 MG/1
40 TABLET, FILM COATED ORAL
Qty: 90 TABLET | Refills: 1 | Status: SHIPPED | OUTPATIENT
Start: 2024-12-17 | End: 2025-12-12

## 2025-01-28 DIAGNOSIS — E78.5 HYPERLIPIDEMIA, UNSPECIFIED HYPERLIPIDEMIA TYPE: ICD-10-CM

## 2025-01-29 RX ORDER — EZETIMIBE 10 MG/1
10 TABLET ORAL DAILY
Qty: 90 TABLET | Refills: 1 | Status: SHIPPED | OUTPATIENT
Start: 2025-01-29

## 2025-02-18 ENCOUNTER — TELEPHONE (OUTPATIENT)
Age: 85
End: 2025-02-18

## 2025-02-18 NOTE — TELEPHONE ENCOUNTER
Patient's wife called back to let Dr. Marte know they are currently at the hospital now because the swelling got worse.    Thank you

## 2025-02-18 NOTE — TELEPHONE ENCOUNTER
Pt's spouse calling asking if Dr. Marte can give pt a call back, he woke up this morning with swelling on his face and it has happened before and they can't get to a doctor as they're currently in [place] bc they only speak [place].    Please advise and see if able to give pt a call back at earliest convenience, TY.  Callback # [phone]

## 2025-02-19 NOTE — TELEPHONE ENCOUNTER
Called and discussed   Patient is feeling better .  He is still on lisinopril and symptoms resolving   Will continue meds and f/u when he is home from [place]

## 2025-02-19 NOTE — TELEPHONE ENCOUNTER
Patient was in the ER yesterday in [place] while on vacation. He went to the ER around noon and was not seen by the Dr there until 1am. The ER Dr wants Patient to stop his Lisinopril. Patient's wife would like a call back from Dr Marte.     Please call: [phone]

## 2025-02-19 NOTE — TELEPHONE ENCOUNTER
Called patient to schedule an appt, he is in [place] till April.  Story is he woke up yesterday and his upper lip and cheek where swollen.  He had jaw pain that goes up to ear.  He said he gets this every year and if you look in chart it was this same time last year.  You usually provide prednisone. He went to the ER in [place] yesterday. The doctor told him lisinopril can cause this. Told him to stop that and switch to amlodipine.  And not to take the steroids. He still has a script of prednisone from you from last year. He decided to take the steriods anyway. He took 6 pills yesterday and 5 today. He said so far he is about 85 percent back to normal.  Patient is hesitate on changing BP meds since he has been on it for awhile.  His question is do you think he needs to stop the lisinopril and take the ampodepine 2.5mg?   It this doesn't make come see me

## 2025-03-20 ENCOUNTER — OFFICE VISIT (OUTPATIENT)
Dept: OBGYN CLINIC | Facility: CLINIC | Age: 85
End: 2025-03-20
Payer: COMMERCIAL

## 2025-03-20 ENCOUNTER — APPOINTMENT (OUTPATIENT)
Dept: RADIOLOGY | Facility: CLINIC | Age: 85
End: 2025-03-20
Payer: COMMERCIAL

## 2025-03-20 VITALS — WEIGHT: 149 LBS | HEIGHT: 68 IN | BODY MASS INDEX: 22.58 KG/M2

## 2025-03-20 DIAGNOSIS — M75.102 LEFT ROTATOR CUFF TEAR ARTHROPATHY: Primary | ICD-10-CM

## 2025-03-20 DIAGNOSIS — M12.812 LEFT ROTATOR CUFF TEAR ARTHROPATHY: Primary | ICD-10-CM

## 2025-03-20 DIAGNOSIS — M25.512 CHRONIC LEFT SHOULDER PAIN: ICD-10-CM

## 2025-03-20 DIAGNOSIS — G89.29 CHRONIC LEFT SHOULDER PAIN: ICD-10-CM

## 2025-03-20 PROCEDURE — 20610 DRAIN/INJ JOINT/BURSA W/O US: CPT | Performed by: ORTHOPAEDIC SURGERY

## 2025-03-20 PROCEDURE — 99214 OFFICE O/P EST MOD 30 MIN: CPT | Performed by: ORTHOPAEDIC SURGERY

## 2025-03-20 PROCEDURE — 73030 X-RAY EXAM OF SHOULDER: CPT

## 2025-03-20 RX ORDER — BETAMETHASONE SODIUM PHOSPHATE AND BETAMETHASONE ACETATE 3; 3 MG/ML; MG/ML
6 INJECTION, SUSPENSION INTRA-ARTICULAR; INTRALESIONAL; INTRAMUSCULAR; SOFT TISSUE
Status: COMPLETED | OUTPATIENT
Start: 2025-03-20 | End: 2025-03-20

## 2025-03-20 RX ORDER — LIDOCAINE HYDROCHLORIDE 10 MG/ML
5 INJECTION, SOLUTION INFILTRATION; PERINEURAL
Status: COMPLETED | OUTPATIENT
Start: 2025-03-20 | End: 2025-03-20

## 2025-03-20 RX ADMIN — BETAMETHASONE SODIUM PHOSPHATE AND BETAMETHASONE ACETATE 6 MG: 3; 3 INJECTION, SUSPENSION INTRA-ARTICULAR; INTRALESIONAL; INTRAMUSCULAR; SOFT TISSUE at 10:45

## 2025-03-20 RX ADMIN — LIDOCAINE HYDROCHLORIDE 5 ML: 10 INJECTION, SOLUTION INFILTRATION; PERINEURAL at 10:45
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
## 2025-03-20 NOTE — ASSESSMENT & PLAN NOTE
Findings are consistent with left shoulder rotator cuff tear arthropathy.  Left shoulder x-rays obtained during today's visit were reviewed with the patient which demonstrates degenerative changes of the glenohumeral joint with high riding humeral head as well as surgical hardware from prior rotator cuff repair.  We discussed surgical versus nonsurgical treatment options.  Explained to the patient nonsurgical treatment options would include a course of physical therapy for left shoulder, rotator cuff, scapular stabilizer strengthening and range of motion, and CSI.  Explained surgical treatment would include a reverse total shoulder arthroplasty which patient wishes to hold off on at this time given his decent range of motion and high activity level.  Given amount of time he has massive rotator cuff tear, surgical repair would have very low success rate.  Patient would like to pursue conservative treatment first.  A physical therapy prescription was provided.  A CSI of the left subacromial bursa was provided during today's visit.  Patient tolerated procedure well.  Patient is not diabetic.  All patient's questions were answered to Darin's satisfaction.  This note is created using dictation transcription.  It may contain typographical errors, grammatical errors, improperly dictated words, background noise and other errors.

## 2025-03-31 ENCOUNTER — EVALUATION (OUTPATIENT)
Dept: PHYSICAL THERAPY | Facility: CLINIC | Age: 85
End: 2025-03-31
Payer: COMMERCIAL

## 2025-03-31 ENCOUNTER — OFFICE VISIT (OUTPATIENT)
Dept: OBGYN CLINIC | Facility: CLINIC | Age: 85
End: 2025-03-31
Payer: COMMERCIAL

## 2025-03-31 VITALS — BODY MASS INDEX: 22.66 KG/M2 | HEIGHT: 68 IN

## 2025-03-31 DIAGNOSIS — M12.812 LEFT ROTATOR CUFF TEAR ARTHROPATHY: Primary | ICD-10-CM

## 2025-03-31 DIAGNOSIS — G56.03 CARPAL TUNNEL SYNDROME, BILATERAL: Primary | ICD-10-CM

## 2025-03-31 DIAGNOSIS — M75.102 LEFT ROTATOR CUFF TEAR ARTHROPATHY: Primary | ICD-10-CM

## 2025-03-31 PROCEDURE — 99213 OFFICE O/P EST LOW 20 MIN: CPT | Performed by: ORTHOPAEDIC SURGERY

## 2025-03-31 PROCEDURE — 97161 PT EVAL LOW COMPLEX 20 MIN: CPT | Performed by: PHYSICAL THERAPIST

## 2025-03-31 PROCEDURE — 97110 THERAPEUTIC EXERCISES: CPT | Performed by: PHYSICAL THERAPIST

## 2025-03-31 NOTE — PROGRESS NOTES
PT Evaluation     Today's date: 3/31/2025  Patient name: Patrick Frey  : 1940  MRN: [id number]  Referring provider: Bryanna Muhammad MD  Dx:   Encounter Diagnosis     ICD-10-CM    1. Left rotator cuff tear arthropathy  M75.102 Ambulatory Referral to Physical Therapy    M12.812                      Assessment  Impairments: abnormal muscle firing, abnormal or restricted ROM, activity intolerance, impaired physical strength, lacks appropriate home exercise program, pain with function and scapular dyskinesis  Symptom irritability: low    Assessment details: Patrick Frey is a 84 y.o. male who presents with pain, decreased strength, and decreased ROM. Due to these impairments, patient has difficulty performing a/iadls, recreational activities, and engaging in social activities. Patient's clinical presentation is consistent with their referring diagnosis of Left rotator cuff tear arthropathy. Patient has been educated in home exercise program and plan of care. Patient would benefit from skilled physical therapy services to address their aforementioned functional limitations and progress towards prior level of function and independence with home exercise program.   Understanding of Dx/Px/POC: good     Prognosis: good    Goals  Short Term Goals:  Target Date 4 weeks  1. Pt will initiate and advance HEP.  2. Pt will have < 3/10 pain  3. Pt will have full arom of the left shoulder  4. Pt will be able to reach overhead with weight of arm with min difficulty     Long Term Goals:  Target Date 8 weeks  1. Pt will demonstrate independence in HEP.  2. Pt will have <1/10 pain  3. Pt will have full c/s and B UE strength  4. Pt will be able to turn left on snow mobile with out pain in post shoulder.         Plan  Patient would benefit from: skilled PT  Planned modality interventions: cryotherapy, electrical stimulation/Russian stimulation and thermotherapy: hydrocollator packs    Planned therapy interventions: joint  mobilization, manual therapy, patient education, postural training, activity modification, abdominal trunk stabilization, body mechanics training, flexibility, functional ROM exercises, graded exercise, home exercise program, neuromuscular re-education, strengthening, stretching, therapeutic activities, therapeutic exercise, motor coordination training, muscle pump exercises, gait training, balance/weight bearing training, ADL training and breathing training    Frequency: 2x week  Duration in weeks: 8  Plan of Care beginning date: 3/31/2025  Plan of Care expiration date: 2025  Treatment plan discussed with: patient        Subjective Evaluation    History of Present Illness  Mechanism of injury: Pt notes a long hx of left shoulder pain. He has had rtc repair on the left side about 6 years ago. He notes that he can still do a lot of things but it fatigues easily, and gives him pain with reaching straight out and lifting the arm overhead. Pt notes that he can still ride his snowmobile but has pain when pulling the left hand back to turn left. Pt notes that he was told his only surgical option is reverse total shoulder but he would like to avoid surgery, and keep as active as possible given the injury.   Patient Goals  Patient goals for therapy: decreased pain, increased motion, independence with ADLs/IADLs, increased strength and return to sport/leisure activities    Pain  Current pain ratin  At best pain ratin  At worst pain ratin  Location: left shoulder  Quality: dull ache, discomfort and sharp          Objective     Postural Observations  Seated posture: good  Standing posture: good      Observations   Left Shoulder   Positive for atrophy and spasms. Negative for edema and effusion.     Palpation   Left   Tenderness of the infraspinatus, pectoralis major, pectoralis minor and subscapularis.     Tenderness     Left Shoulder   Tenderness in the AC joint and acromion.     Active Range of Motion   Left  Shoulder   Flexion: 45 degrees with pain  Abduction: WFL  External rotation BTH: WFL  Internal rotation BTB: WFL    Strength/Myotome Testing     Left Shoulder     Planes of Motion   Flexion: 3-   Abduction: 4-   External rotation at 0°: 2+   External rotation BTH: 4     Right Shoulder     Planes of Motion   Flexion: 4+   Abduction: 4+   External rotation at 0°: 4   External rotation BTH: 4+     Tests     Left Shoulder   Positive drop arm, empty can, external rotation lag sign and painful arc.   Negative Neer's.              Precautions: none      Manuals 3/31            Left pec, infra, subscap stm DB                                                   Neuro Re-Ed             S/l ER nv            Supine circles 2x10 ea            S/l abd 2x10            Ball blessing nv                                                   Ther Ex             Pec stm doorway nv            Lat/subscap foam nv            Shld rows at 45 nv                                                                             Ther Activity                                       Gait Training                                       Modalities

## 2025-04-02 ENCOUNTER — OFFICE VISIT (OUTPATIENT)
Dept: PHYSICAL THERAPY | Facility: CLINIC | Age: 85
End: 2025-04-02
Payer: COMMERCIAL

## 2025-04-02 DIAGNOSIS — M75.102 LEFT ROTATOR CUFF TEAR ARTHROPATHY: Primary | ICD-10-CM

## 2025-04-02 DIAGNOSIS — M12.812 LEFT ROTATOR CUFF TEAR ARTHROPATHY: Primary | ICD-10-CM

## 2025-04-02 PROCEDURE — 97110 THERAPEUTIC EXERCISES: CPT | Performed by: PHYSICAL THERAPIST

## 2025-04-02 PROCEDURE — 97140 MANUAL THERAPY 1/> REGIONS: CPT | Performed by: PHYSICAL THERAPIST

## 2025-04-02 NOTE — PROGRESS NOTES
Daily Note     Today's date: 2025  Patient name: Patrick Frey  : 1940  MRN: [id number]  Referring provider: Bryanna Muhammad MD  Dx:   Encounter Diagnosis     ICD-10-CM    1. Left rotator cuff tear arthropathy  M75.102     M12.812                      Subjective: pt notes no change in his shoulder since his IE on monday      Objective: See treatment diary below      Assessment: did add in prone MT/LT exercises as well as supinf H abd and scaption exercises to HEP. Nv will add in self massage with the lax ball to HEP for continued self help. Will probably see pt 1-2 more weeks then d/c to HEP so pt can return to [place].     Plan: Continue per plan of care.      Precautions: none      Manuals 3/31 4/2           Left pec, infra, subscap stm DB DB           Inf, and post mobs  Grade 3 DB                                     Neuro Re-Ed             S/l ER nv 2x10           Supine circles 2x10 ea            S/l abd 2x10 2x10           Ball blessing nv                                                   Ther Ex             Pec stm doorway nv            Lat/subscap foam nv            Shld rows at 45 nv            Prone Y and T  2x10           Supine H abd  3# 2x10           Supine scaption  3# 2x10                                     Ther Activity                                       Gait Training                                       Modalities

## 2025-04-07 ENCOUNTER — OFFICE VISIT (OUTPATIENT)
Dept: PHYSICAL THERAPY | Facility: CLINIC | Age: 85
End: 2025-04-07
Payer: COMMERCIAL

## 2025-04-07 DIAGNOSIS — M75.102 LEFT ROTATOR CUFF TEAR ARTHROPATHY: Primary | ICD-10-CM

## 2025-04-07 DIAGNOSIS — M12.812 LEFT ROTATOR CUFF TEAR ARTHROPATHY: Primary | ICD-10-CM

## 2025-04-07 PROCEDURE — 97140 MANUAL THERAPY 1/> REGIONS: CPT | Performed by: PHYSICAL THERAPIST

## 2025-04-07 PROCEDURE — 97110 THERAPEUTIC EXERCISES: CPT | Performed by: PHYSICAL THERAPIST

## 2025-04-07 NOTE — PROGRESS NOTES
Daily Note     Today's date: 2025  Patient name: Patrick Frey  : 1940  MRN: [id number]  Referring provider: Bryanna Muhammad MD  Dx:   Encounter Diagnosis     ICD-10-CM    1. Left rotator cuff tear arthropathy  M75.102     M12.812                      Subjective: pt notes that he has been able to increase his weight on all exercises but supine flexion not able to b/c of fatigue. Not pain      Objective: See treatment diary below      Assessment: pt did well with new exercises. Rom looks very good apssively. Will look to hold back to 1x per week to allow for more tissue adaptation. Will probably look to d/c to HEP by end of month.     Plan: Continue per plan of care.      Precautions: none      Manuals 3/31 4/2 4/7          Left pec, infra, subscap stm DB DB DB          Inf, and post mobs  Grade 3 DB DB                                    Neuro Re-Ed             S/l ER nv 2x10           Supine circles 2x10 ea            S/l abd 2x10 2x10           Ball blessing nv                                                   Ther Ex             [place] nv            Lat/subscap foam nv            Shld rows at 45 nv            Prone Y and T  2x10 3# 2x10          Supine H abd  3# 2x10           Supine scaption  3# 2x10           S/l flex to H abd   3# 2x10          clocks   Red x10          Ther Activity                                       Gait Training                                       Modalities

## 2025-04-09 ENCOUNTER — HOSPITAL ENCOUNTER (OUTPATIENT)
Dept: ULTRASOUND IMAGING | Facility: HOSPITAL | Age: 85
Discharge: HOME | End: 2025-04-09
Attending: ORTHOPAEDIC SURGERY
Payer: COMMERCIAL

## 2025-04-09 ENCOUNTER — APPOINTMENT (OUTPATIENT)
Dept: PHYSICAL THERAPY | Facility: CLINIC | Age: 85
End: 2025-04-09
Payer: COMMERCIAL

## 2025-04-09 DIAGNOSIS — G56.03 CARPAL TUNNEL SYNDROME, BILATERAL: ICD-10-CM

## 2025-04-09 PROCEDURE — 76882 US LMTD JT/FCL EVL NVASC XTR: CPT

## 2025-04-14 ENCOUNTER — APPOINTMENT (OUTPATIENT)
Dept: PHYSICAL THERAPY | Facility: CLINIC | Age: 85
End: 2025-04-14
Payer: COMMERCIAL

## 2025-04-15 ENCOUNTER — TELEPHONE (OUTPATIENT)
Age: 85
End: 2025-04-15

## 2025-04-15 ENCOUNTER — PREP FOR PROCEDURE (OUTPATIENT)
Dept: OBGYN CLINIC | Facility: CLINIC | Age: 85
End: 2025-04-15

## 2025-04-15 ENCOUNTER — OFFICE VISIT (OUTPATIENT)
Dept: OBGYN CLINIC | Facility: CLINIC | Age: 85
End: 2025-04-15
Payer: COMMERCIAL

## 2025-04-15 VITALS — BODY MASS INDEX: 22.58 KG/M2 | HEIGHT: 68 IN | WEIGHT: 149 LBS

## 2025-04-15 DIAGNOSIS — G56.03 CARPAL TUNNEL SYNDROME, BILATERAL: Primary | ICD-10-CM

## 2025-04-15 PROCEDURE — 99214 OFFICE O/P EST MOD 30 MIN: CPT | Performed by: ORTHOPAEDIC SURGERY

## 2025-04-15 NOTE — PROGRESS NOTES
ORTHOPAEDIC HAND, WRIST, AND ELBOW OFFICE  VISIT     Name: Patrick Frey      : 1940      MRN: [id number]  Encounter Provider: Radha Maher MD  Encounter Date: 4/15/2025   Encounter department: [facility]  :  Assessment & Plan  Carpal tunnel syndrome, bilateral  Subjective history, clinical exam, and diagnostic studies reviewed with patient  Diagnosis discussed- recurrent CTS bilaterally  Treatment options discussed which include nonoperative and operative management.  We discussed use of splinting, therapy, activity modification, use of NSAIDs (oral and topical), and injections. We discussed risks and benefits of each and well as expected reasonable outcomes.  Surgery can be considered given history, clinical exam, and MSK US findings.    The patient decided to move forward with Bilateral revision carpal tunnel release 2 weeks apart via shared decision making.  Patient understood the surgical incision for revision CTR is much larger than an endoscopic or mini-open release.  He will not be able to ride his motorcycle for 8 weeks after surgery.   We discussed the surgical procedure, risks, benefits, and alternatives, as well as, post-operative care expectations following surgery.    The patient was given the opportunity to ask questions.  Questions were answered to the patient's satisfaction.  Consent signed in the office         Of note:  Patient underwent Right endoscopic carpal tunnel release in '                                Left endoscopic release carpal tunnel release in '                                Left open carpal tunnel release in 19 with [facility]ullo           History of Present Illness   HPI  Chief Complaint   Patient presents with    Left Hand - Follow-up, Tingling     Left hand worse, worse at night in both hands    Right Hand - Follow-up, Tingling       Patrick Frey is a 84 y.o. male who presents to the office today for  "bilateral carpal tunnel follow-up following his bilateral hand ultrasounds. Patient denies any changes in his symptoms. He continues to have pain as well as numbness and tingling in the bilateral hands, left worse than right. He is having difficulty sleeping with his elbows and hands straight and does wake up from pain in the night. He is interested in b/l carpal tunnel release at this time.    Of note:  Patient underwent Right endoscopic carpal tunnel release in '05                                Left endoscopic release carpal tunnel release in '04                                Left open revision carpal tunnel release in 4/25/19 with [facility]ullo     Hand dominance: Right    REVIEW OF SYSTEMS:  General: no fever, no chills  HEENT:  No loss of hearing or eyesight problems  Eyes:  No red eyes  Respiratory:  No coughing, shortness of breath or wheezing  Cardiovascular:  No chest pain, no palpitations  GI:  Abdomen soft nontender, denies nausea  Endocrine:  No muscle weakness, no frequent urination, no excessive thirst  Urinary:  No dysuria, no incontinence  Musculoskeletal: see HPI and PE  SKIN:  No skin rash, no dry skin  Neurological:  No headaches, no confusion  Psychiatric:  No suicide thoughts, no anxiety, no depression  Review of all other systems is negative    Objective   Ht 5' 8\" (1.727 m)   Wt 67.6 kg (149 lb)   BMI 22.66 kg/m²      General: well developed and well nourished, alert, oriented times 3, and appears comfortable  Psychiatric: Normal  HEENT: Trachea Midline, No torticollis  Cardiovascular: No discernable arrhythmia  Pulmonary: No wheezing or stridor  Abdomen: No rebound or guarding  Extremities: No peripheral edema  Skin: No masses, erythema, lacerations, fluctation, ulcerations  Neurovascular: Sensation Intact to the Median, Ulnar, Radial Nerve, Motor Intact to the Median, Ulnar, Radial Nerve, and Pulses Intact    Musculoskeletal exam:  LEFT SIDE: diminished wrist mobility,  Carpal tunnel:  No " weakness APB, Atrophy to thenar muscles, and positive wrist flexion compression  RIGHT SIDE:  Carpal tunnel:  No weakness APB, No atrophy thenar muscles, and Positive wrist flexion compression       STUDIES REVIEWED:  US of the bilateral carpal tunnel was reviewed and demonstrates CSA>/=14sqmm bilaterally, consistent with carpal tunnel.       PROCEDURES PERFORMED:  Procedures  No Procedures performed today

## 2025-04-15 NOTE — H&P (VIEW-ONLY)
ORTHOPAEDIC HAND, WRIST, AND ELBOW OFFICE  VISIT     Name: Patrick Frey      : 1940      MRN: [id number]  Encounter Provider: Radha Maher MD  Encounter Date: 4/15/2025   Encounter department: [facility]  :  Assessment & Plan  Carpal tunnel syndrome, bilateral  Subjective history, clinical exam, and diagnostic studies reviewed with patient  Diagnosis discussed- recurrent CTS bilaterally  Treatment options discussed which include nonoperative and operative management.  We discussed use of splinting, therapy, activity modification, use of NSAIDs (oral and topical), and injections. We discussed risks and benefits of each and well as expected reasonable outcomes.  Surgery can be considered given history, clinical exam, and MSK US findings.    The patient decided to move forward with Bilateral revision carpal tunnel release 2 weeks apart via shared decision making.  Patient understood the surgical incision for revision CTR is much larger than an endoscopic or mini-open release.  He will not be able to ride his motorcycle for 8 weeks after surgery.   We discussed the surgical procedure, risks, benefits, and alternatives, as well as, post-operative care expectations following surgery.    The patient was given the opportunity to ask questions.  Questions were answered to the patient's satisfaction.  Consent signed in the office         Of note:  Patient underwent Right endoscopic carpal tunnel release in '                                Left endoscopic release carpal tunnel release in '                                Left open carpal tunnel release in 19 with Matullo           History of Present Illness   HPI  Chief Complaint   Patient presents with    Left Hand - Follow-up, Tingling     Left hand worse, worse at night in both hands    Right Hand - Follow-up, Tingling       Patrick Frey is a 84 y.o. male who presents to the office today for  "bilateral carpal tunnel follow-up following his bilateral hand ultrasounds. Patient denies any changes in his symptoms. He continues to have pain as well as numbness and tingling in the bilateral hands, left worse than right. He is having difficulty sleeping with his elbows and hands straight and does wake up from pain in the night. He is interested in b/l carpal tunnel release at this time.    Of note:  Patient underwent Right endoscopic carpal tunnel release in '05                                Left endoscopic release carpal tunnel release in '04                                Left open revision carpal tunnel release in 4/25/19 with Matullo     Hand dominance: Right    REVIEW OF SYSTEMS:  General: no fever, no chills  HEENT:  No loss of hearing or eyesight problems  Eyes:  No red eyes  Respiratory:  No coughing, shortness of breath or wheezing  Cardiovascular:  No chest pain, no palpitations  GI:  Abdomen soft nontender, denies nausea  Endocrine:  No muscle weakness, no frequent urination, no excessive thirst  Urinary:  No dysuria, no incontinence  Musculoskeletal: see HPI and PE  SKIN:  No skin rash, no dry skin  Neurological:  No headaches, no confusion  Psychiatric:  No suicide thoughts, no anxiety, no depression  Review of all other systems is negative    Objective   Ht 5' 8\" (1.727 m)   Wt 67.6 kg (149 lb)   BMI 22.66 kg/m²      General: well developed and well nourished, alert, oriented times 3, and appears comfortable  Psychiatric: Normal  HEENT: Trachea Midline, No torticollis  Cardiovascular: No discernable arrhythmia  Pulmonary: No wheezing or stridor  Abdomen: No rebound or guarding  Extremities: No peripheral edema  Skin: No masses, erythema, lacerations, fluctation, ulcerations  Neurovascular: Sensation Intact to the Median, Ulnar, Radial Nerve, Motor Intact to the Median, Ulnar, Radial Nerve, and Pulses Intact    Musculoskeletal exam:  LEFT SIDE: diminished wrist mobility,  Carpal tunnel:  No " weakness APB, Atrophy to thenar muscles, and positive wrist flexion compression  RIGHT SIDE:  Carpal tunnel:  No weakness APB, No atrophy thenar muscles, and Positive wrist flexion compression       STUDIES REVIEWED:  US of the bilateral carpal tunnel was reviewed and demonstrates CSA>/=14sqmm bilaterally, consistent with carpal tunnel.       PROCEDURES PERFORMED:  Procedures  No Procedures performed today

## 2025-04-15 NOTE — TELEPHONE ENCOUNTER
Pt calling to confirm appt on 4/29 has been cancelled.     Confirmed this appt was cancelled and offered to reschedule. Pt declined and will call back to reschedule at a later time.

## 2025-04-15 NOTE — H&P (VIEW-ONLY)
ORTHOPAEDIC HAND, WRIST, AND ELBOW OFFICE  VISIT     Name: Patrick Frey      : 1940      MRN: [id number]  Encounter Provider: Radha Maher MD  Encounter Date: 4/15/2025   Encounter department: [facility]  :  Assessment & Plan  Carpal tunnel syndrome, bilateral  Subjective history, clinical exam, and diagnostic studies reviewed with patient  Diagnosis discussed- recurrent CTS bilaterally  Treatment options discussed which include nonoperative and operative management.  We discussed use of splinting, therapy, activity modification, use of NSAIDs (oral and topical), and injections. We discussed risks and benefits of each and well as expected reasonable outcomes.  Surgery can be considered given history, clinical exam, and MSK US findings.    The patient decided to move forward with Bilateral revision carpal tunnel release 2 weeks apart via shared decision making.  Patient understood the surgical incision for revision CTR is much larger than an endoscopic or mini-open release.  He will not be able to ride his motorcycle for 8 weeks after surgery.   We discussed the surgical procedure, risks, benefits, and alternatives, as well as, post-operative care expectations following surgery.    The patient was given the opportunity to ask questions.  Questions were answered to the patient's satisfaction.  Consent signed in the office         Of note:  Patient underwent Right endoscopic carpal tunnel release in '                                Left endoscopic release carpal tunnel release in '                                Left open carpal tunnel release in 19 with [facility]ullo           History of Present Illness   HPI  Chief Complaint   Patient presents with    Left Hand - Follow-up, Tingling     Left hand worse, worse at night in both hands    Right Hand - Follow-up, Tingling       Patrick Frey is a 84 y.o. male who presents to the office today for  "bilateral carpal tunnel follow-up following his bilateral hand ultrasounds. Patient denies any changes in his symptoms. He continues to have pain as well as numbness and tingling in the bilateral hands, left worse than right. He is having difficulty sleeping with his elbows and hands straight and does wake up from pain in the night. He is interested in b/l carpal tunnel release at this time.    Of note:  Patient underwent Right endoscopic carpal tunnel release in '05                                Left endoscopic release carpal tunnel release in '04                                Left open revision carpal tunnel release in 4/25/19 with Matullo     Hand dominance: Right    REVIEW OF SYSTEMS:  General: no fever, no chills  HEENT:  No loss of hearing or eyesight problems  Eyes:  No red eyes  Respiratory:  No coughing, shortness of breath or wheezing  Cardiovascular:  No chest pain, no palpitations  GI:  Abdomen soft nontender, denies nausea  Endocrine:  No muscle weakness, no frequent urination, no excessive thirst  Urinary:  No dysuria, no incontinence  Musculoskeletal: see HPI and PE  SKIN:  No skin rash, no dry skin  Neurological:  No headaches, no confusion  Psychiatric:  No suicide thoughts, no anxiety, no depression  Review of all other systems is negative    Objective   Ht 5' 8\" (1.727 m)   Wt 67.6 kg (149 lb)   BMI 22.66 kg/m²      General: well developed and well nourished, alert, oriented times 3, and appears comfortable  Psychiatric: Normal  HEENT: Trachea Midline, No torticollis  Cardiovascular: No discernable arrhythmia  Pulmonary: No wheezing or stridor  Abdomen: No rebound or guarding  Extremities: No peripheral edema  Skin: No masses, erythema, lacerations, fluctation, ulcerations  Neurovascular: Sensation Intact to the Median, Ulnar, Radial Nerve, Motor Intact to the Median, Ulnar, Radial Nerve, and Pulses Intact    Musculoskeletal exam:  LEFT SIDE: diminished wrist mobility,  Carpal tunnel:  No " weakness APB, Atrophy to thenar muscles, and positive wrist flexion compression  RIGHT SIDE:  Carpal tunnel:  No weakness APB, No atrophy thenar muscles, and Positive wrist flexion compression       STUDIES REVIEWED:  US of the bilateral carpal tunnel was reviewed and demonstrates CSA>/=14sqmm bilaterally, consistent with carpal tunnel.       PROCEDURES PERFORMED:  Procedures  No Procedures performed today

## 2025-04-16 ENCOUNTER — APPOINTMENT (OUTPATIENT)
Dept: PHYSICAL THERAPY | Facility: CLINIC | Age: 85
End: 2025-04-16
Payer: COMMERCIAL

## 2025-04-17 ENCOUNTER — OFFICE VISIT (OUTPATIENT)
Dept: FAMILY MEDICINE CLINIC | Facility: CLINIC | Age: 85
End: 2025-04-17
Payer: COMMERCIAL

## 2025-04-17 VITALS
TEMPERATURE: 97.2 F | SYSTOLIC BLOOD PRESSURE: 132 MMHG | DIASTOLIC BLOOD PRESSURE: 80 MMHG | OXYGEN SATURATION: 99 % | HEIGHT: 68 IN | BODY MASS INDEX: 22.49 KG/M2 | HEART RATE: 60 BPM | RESPIRATION RATE: 16 BRPM | WEIGHT: 148.4 LBS

## 2025-04-17 DIAGNOSIS — E78.5 HYPERLIPIDEMIA, UNSPECIFIED HYPERLIPIDEMIA TYPE: ICD-10-CM

## 2025-04-17 DIAGNOSIS — Z00.00 ENCOUNTER FOR WELL ADULT EXAM WITHOUT ABNORMAL FINDINGS: Primary | ICD-10-CM

## 2025-04-17 DIAGNOSIS — Z79.899 HIGH RISK MEDICATION USE: ICD-10-CM

## 2025-04-17 DIAGNOSIS — Z12.5 SCREENING FOR PROSTATE CANCER: ICD-10-CM

## 2025-04-17 PROCEDURE — G0439 PPPS, SUBSEQ VISIT: HCPCS | Performed by: FAMILY MEDICINE

## 2025-04-17 NOTE — PROGRESS NOTES
Name: Patrick Frey      : 1940      MRN: [id number]  Encounter Provider: Raymon Marte DO  Encounter Date: 2025   Encounter department: [facility]  :  Assessment & Plan  Encounter for well adult exam without abnormal findings  Overall patient doing well  Labs ordered  Patient will follow-up with hand orthopedics  He is up-to-date with his health maintenance and he can follow-up in 1 year or sooner if needed       Hyperlipidemia, unspecified hyperlipidemia type    Orders:    Lipid panel; Future    High risk medication use    Orders:    CBC and differential; Future    Comprehensive metabolic panel; Future    UA (URINE) with reflex to Scope; Future    Screening for prostate cancer    Orders:    PSA, Total Screen; Future      Depression Screening and Follow-up Plan: Patient was screened for depression during today's encounter. They screened negative with a PHQ-2 score of 0.        Preventive health issues were discussed with patient, and age appropriate screening tests were ordered as noted in patient's After Visit Summary. Personalized health advice and appropriate referrals for health education or preventive services given if needed, as noted in patient's After Visit Summary.    History of Present Illness     Patient for Medicare wellness visit  Pt states that he is doing well overall. He states that his left shoulder causes him pain due to rotator cuff tear. Pt is going to physical therapy for his shoulder, but notes that ROM is still limited. Getting carpal tunnel surgery May 2 and then the second surgery one month after.        Patient Care Team:  Raymon Marte DO as PCP - General  MD Kulwant Alberto MD    Review of Systems   Constitutional: Negative.    HENT: Negative.     Eyes: Negative.    Respiratory: Negative.     Cardiovascular: Negative.    Gastrointestinal: Negative.    Endocrine: Negative.    Genitourinary: Negative.    Musculoskeletal: Negative.    Skin:  Negative.    Allergic/Immunologic: Negative.    Neurological: Negative.    Hematological: Negative.    Psychiatric/Behavioral: Negative.     All other systems reviewed and are negative.    Medical History Reviewed by provider this encounter:       Annual Wellness Visit Questionnaire   Patrick is here for his Subsequent Wellness visit. Last Medicare Wellness visit information reviewed, patient interviewed and updates made to the record today.      Health Risk Assessment:   Patient rates overall health as very good. Patient feels that their physical health rating is same. Patient is very satisfied with their life. Eyesight was rated as same. Hearing was rated as same. Patient feels that their emotional and mental health rating is same. Patients states they are sometimes angry. Patient states they are never, rarely unusually tired/fatigued. Pain experienced in the last 7 days has been none. Patient states that he has experienced no weight loss or gain in last 6 months.     Depression Screening:   PHQ-2 Score: 0      Fall Risk Screening:   In the past year, patient has experienced: no history of falling in past year      Home Safety:  Patient does not have trouble with stairs inside or outside of their home. Patient has working smoke alarms and has working carbon monoxide detector. Home safety hazards include: none.     Nutrition:   Current diet is Regular.     Medications:   Patient is currently taking over-the-counter supplements. OTC medications include: see medication list. Patient is able to manage medications.     Activities of Daily Living (ADLs)/Instrumental Activities of Daily Living (IADLs):   Walk and transfer into and out of bed and chair?: Yes  Dress and groom yourself?: Yes    Bathe or shower yourself?: Yes    Feed yourself? Yes  Do your laundry/housekeeping?: Yes  Manage your money, pay your bills and track your expenses?: Yes  Make your own meals?: Yes    Do your own shopping?: Yes    Previous  Hospitalizations:   Any hospitalizations or ED visits within the last 12 months?: No      Advance Care Planning:   Living will: Yes    Durable POA for healthcare: Yes    Advanced directive: Yes    Advanced directive counseling given: Yes    End of Life Decisions reviewed with patient: Yes    Provider agrees with end of life decisions: Yes      Cognitive Screening:   Provider or family/friend/caregiver concerned regarding cognition?: No    Preventive Screenings      Cardiovascular Screening:    General: History Lipid Disorder and Screening Current      Diabetes Screening:     General: Screening Current      Colorectal Cancer Screening:     General: Screening Current      Prostate Cancer Screening:    General: Screening Not Indicated      Osteoporosis Screening:    General: Screening Not Indicated      Abdominal Aortic Aneurysm (AAA) Screening:    Risk factors include: tobacco use        General: Screening Not Indicated      Lung Cancer Screening:     General: Screening Not Indicated      Hepatitis C Screening:    General: Screening Not Indicated    Screening, Brief Intervention, and Referral to Treatment (SBIRT)     Screening  Typical number of drinks in a day: 2  Typical number of drinks in a week: 14  Interpretation: Low risk drinking behavior.    AUDIT-C Screenin) How often did you have a drink containing alcohol in the past year? 4 or more times a week  2) How many drinks did you have on a typical day when you were drinking in the past year? 1 to 2  3) How often did you have 6 or more drinks on one occasion in the past year? never    AUDIT-C Score: 4  Interpretation: Score 4-12 (male): POSITIVE screen for alcohol misuse    AUDIT Screenin) How often during the last year have you found that you were not able to stop drinking once you had started? 0 - never  5) How often during the last year have you failed to do what was normally expected from you because of drinking? 0 - never  6) How often during the  last year have you needed a first drink in the morning to get yourself going after a heavy drinking session? 0 - never  7) How often during the last year have you had a feeling of guilt or remorse after drinking? 0 - never  8) How often during the last year have you been unable to remember what happened the night before because you had been drinking? 1 - less than monthly  9) Have you or someone else been injured as a result of your drinking? 0 - no  10) Has a relative or friend or a doctor or another health worker been concerned about your drinking or suggested you cut down? 0 - no    AUDIT Score: 5  Interpretation: Low risk alcohol consumption    Single Item Drug Screening:  How often have you used an illegal drug (including marijuana) or a prescription medication for non-medical reasons in the past year? never    Single Item Drug Screen Score: 0  Interpretation: Negative screen for possible drug use disorder    Brief Intervention  Alcohol & drug use screenings were reviewed. No concerns regarding substance use disorder identified.     Other Counseling Topics:   Car/seat belt/driving safety, skin self-exam, sunscreen and regular weightbearing exercise and calcium and vitamin D intake.     Social Drivers of Health     Financial Resource Strain: Low Risk  (12/11/2023)    Overall Financial Resource Strain (CARDIA)     Difficulty of Paying Living Expenses: Not hard at all   Food Insecurity: No Food Insecurity (4/12/2025)    Hunger Vital Sign     Worried About Running Out of Food in the Last Year: Never true     Ran Out of Food in the Last Year: Never true   Transportation Needs: No Transportation Needs (4/12/2025)    PRAPARE - Transportation     Lack of Transportation (Medical): No     Lack of Transportation (Non-Medical): No   Housing Stability: Unknown (4/12/2025)    Housing Stability Vital Sign     Unable to Pay for Housing in the Last Year: No     Homeless in the Last Year: No   Utilities: Not At Risk (4/12/2025)  "   [facility] Utilities     Threatened with loss of utilities: No     No results found.    Objective   /80 (BP Location: Left arm, Patient Position: Sitting, Cuff Size: Standard)   Pulse 60   Temp (!) 97.2 °F (36.2 °C) (Tympanic)   Resp 16   Ht 5' 8\" (1.727 m)   Wt 67.3 kg (148 lb 6.4 oz)   SpO2 99%   BMI 22.56 kg/m²     Physical Exam  Vitals and nursing note reviewed.   Constitutional:       Appearance: Normal appearance. He is well-developed.   HENT:      Head: Normocephalic.      Right Ear: External ear normal.      Left Ear: External ear normal.      Nose: Nose normal.   Eyes:      Conjunctiva/sclera: Conjunctivae normal.      Pupils: Pupils are equal, round, and reactive to light.   Cardiovascular:      Rate and Rhythm: Normal rate and regular rhythm.      Heart sounds: Normal heart sounds.   Pulmonary:      Effort: Pulmonary effort is normal.      Breath sounds: Normal breath sounds.   Abdominal:      General: Bowel sounds are normal.      Palpations: Abdomen is soft.   Musculoskeletal:         General: Normal range of motion.      Cervical back: Normal range of motion and neck supple.   Skin:     General: Skin is warm and dry.   Neurological:      General: No focal deficit present.      Mental Status: He is alert and oriented to person, place, and time.   Psychiatric:         Behavior: Behavior normal.         Thought Content: Thought content normal.         Judgment: Judgment normal.         "

## 2025-04-17 NOTE — PATIENT INSTRUCTIONS
Medicare Preventive Visit Patient Instructions  Thank you for completing your Welcome to Medicare Visit or Medicare Annual Wellness Visit today. Your next wellness visit will be due in one year (4/18/2026).  The screening/preventive services that you may require over the next 5-10 years are detailed below. Some tests may not apply to you based off risk factors and/or age. Screening tests ordered at today's visit but not completed yet may show as past due. Also, please note that scanned in results may not display below.  Preventive Screenings:  Service Recommendations Previous Testing/Comments   Colorectal Cancer Screening  Colonoscopy    Fecal Occult Blood Test (FOBT)/Fecal Immunochemical Test (FIT)  Fecal DNA/Cologuard Test  Flexible Sigmoidoscopy Age: 45-75 years old   Colonoscopy: every 10 years (May be performed more frequently if at higher risk)  OR  FOBT/FIT: every 1 year  OR  Cologuard: every 3 years  OR  Sigmoidoscopy: every 5 years  Screening may be recommended earlier than age 45 if at higher risk for colorectal cancer. Also, an individualized decision between you and your healthcare provider will decide whether screening between the ages of 76-85 would be appropriate. Colonoscopy: 12/21/2021  FOBT/FIT: Not on file  Cologuard: Not on file  Sigmoidoscopy: Not on file          Prostate Cancer Screening Individualized decision between patient and health care provider in men between ages of 55-69   Medicare will cover every 12 months beginning on the day after your 50th birthday PSA: 1.54 ng/mL           Hepatitis C Screening Once for adults born between 1945 and 1965  More frequently in patients at high risk for Hepatitis C Hep C Antibody: Not on file        Diabetes Screening 1-2 times per year if you're at risk for diabetes or have pre-diabetes Fasting glucose: 90 mg/dL (7/3/2024)  A1C: No results in last 5 years (No results in last 5 years)      Cholesterol Screening Once every 5 years if you don't have a  lipid disorder. May order more often based on risk factors. Lipid panel: 07/03/2024         Other Preventive Screenings Covered by Medicare:  Abdominal Aortic Aneurysm (AAA) Screening: covered once if your at risk. You're considered to be at risk if you have a family history of AAA or a male between the age of 65-75 who smoking at least 100 cigarettes in your lifetime.  Lung Cancer Screening: covers low dose CT scan once per year if you meet all of the following conditions: (1) Age 55-77; (2) No signs or symptoms of lung cancer; (3) Current smoker or have quit smoking within the last 15 years; (4) You have a tobacco smoking history of at least 20 pack years (packs per day x number of years you smoked); (5) You get a written order from a healthcare provider.  Glaucoma Screening: covered annually if you're considered high risk: (1) You have diabetes OR (2) Family history of glaucoma OR (3)  aged 50 and older OR (4)  American aged 65 and older  Osteoporosis Screening: covered every 2 years if you meet one of the following conditions: (1) Have a vertebral abnormality; (2) On glucocorticoid therapy for more than 3 months; (3) Have primary hyperparathyroidism; (4) On osteoporosis medications and need to assess response to drug therapy.  HIV Screening: covered annually if you're between the age of 15-65. Also covered annually if you are younger than 15 and older than 65 with risk factors for HIV infection. For pregnant patients, it is covered up to 3 times per pregnancy.    Immunizations:  Immunization Recommendations   Influenza Vaccine Annual influenza vaccination during flu season is recommended for all persons aged >= 6 months who do not have contraindications   Pneumococcal Vaccine   * Pneumococcal conjugate vaccine = PCV13 (Prevnar 13), PCV15 (Vaxneuvance), PCV20 (Prevnar 20)  * Pneumococcal polysaccharide vaccine = PPSV23 (Pneumovax) Adults 19-63 yo with certain risk factors or if 65+ yo  If  never received any pneumonia vaccine: recommend Prevnar 20 (PCV20)  Give PCV20 if previously received 1 dose of PCV13 or PPSV23   Hepatitis B Vaccine 3 dose series if at intermediate or high risk (ex: diabetes, end stage renal disease, liver disease)   Respiratory syncytial virus (RSV) Vaccine - COVERED BY MEDICARE PART D  * RSVPreF3 (Arexvy) CDC recommends that adults 60 years of age and older may receive a single dose of RSV vaccine using shared clinical decision-making (SCDM)   Tetanus (Td) Vaccine - COST NOT COVERED BY MEDICARE PART B Following completion of primary series, a booster dose should be given every 10 years to maintain immunity against tetanus. Td may also be given as tetanus wound prophylaxis.   Tdap Vaccine - COST NOT COVERED BY MEDICARE PART B Recommended at least once for all adults. For pregnant patients, recommended with each pregnancy.   Shingles Vaccine (Shingrix) - COST NOT COVERED BY MEDICARE PART B  2 shot series recommended in those 19 years and older who have or will have weakened immune systems or those 50 years and older     Health Maintenance Due:      Topic Date Due   • Colorectal Cancer Screening  Discontinued     Immunizations Due:      Topic Date Due   • COVID-19 Vaccine (9 - 2024-25 season) 10/02/2024     Advance Directives   What are advance directives?  Advance directives are legal documents that state your wishes and plans for medical care. These plans are made ahead of time in case you lose your ability to make decisions for yourself. Advance directives can apply to any medical decision, such as the treatments you want, and if you want to donate organs.   What are the types of advance directives?  There are many types of advance directives, and each state has rules about how to use them. You may choose a combination of any of the following:  Living will:  This is a written record of the treatment you want. You can also choose which treatments you do not want, which to limit,  and which to stop at a certain time. This includes surgery, medicine, IV fluid, and tube feedings.   Durable power of  for healthcare (DPAHC):  This is a written record that states who you want to make healthcare choices for you when you are unable to make them for yourself. This person, called a proxy, is usually a family member or a friend. You may choose more than 1 proxy.  Do not resuscitate (DNR) order:  A DNR order is used in case your heart stops beating or you stop breathing. It is a request not to have certain forms of treatment, such as CPR. A DNR order may be included in other types of advance directives.  Medical directive:  This covers the care that you want if you are in a coma, near death, or unable to make decisions for yourself. You can list the treatments you want for each condition. Treatment may include pain medicine, surgery, blood transfusions, dialysis, IV or tube feedings, and a ventilator (breathing machine).  Values history:  This document has questions about your views, beliefs, and how you feel and think about life. This information can help others choose the care that you would choose.  Why are advance directives important?  An advance directive helps you control your care. Although spoken wishes may be used, it is better to have your wishes written down. Spoken wishes can be misunderstood, or not followed. Treatments may be given even if you do not want them. An advance directive may make it easier for your family to make difficult choices about your care.       © Copyright [employer] 2018 Information is for End User's use only and may not be sold, redistributed or otherwise used for commercial purposes. All illustrations and images included in CareNotes® are the copyrighted property of [employer] or [employer]

## 2025-04-18 ENCOUNTER — APPOINTMENT (OUTPATIENT)
Dept: LAB | Facility: CLINIC | Age: 85
End: 2025-04-18
Payer: COMMERCIAL

## 2025-04-18 DIAGNOSIS — Z12.5 SCREENING FOR PROSTATE CANCER: ICD-10-CM

## 2025-04-18 DIAGNOSIS — Z79.899 HIGH RISK MEDICATION USE: ICD-10-CM

## 2025-04-18 DIAGNOSIS — E78.5 HYPERLIPIDEMIA, UNSPECIFIED HYPERLIPIDEMIA TYPE: ICD-10-CM

## 2025-04-18 LAB
ALBUMIN SERPL BCG-MCNC: 4.4 G/DL (ref 3.5–5)
ALP SERPL-CCNC: 55 U/L (ref 34–104)
ALT SERPL W P-5'-P-CCNC: 24 U/L (ref 7–52)
ANION GAP SERPL CALCULATED.3IONS-SCNC: 8 MMOL/L (ref 4–13)
AST SERPL W P-5'-P-CCNC: 28 U/L (ref 13–39)
BASOPHILS # BLD AUTO: 0.04 THOUSANDS/ÂΜL (ref 0–0.1)
BASOPHILS NFR BLD AUTO: 0 % (ref 0–1)
BILIRUB SERPL-MCNC: 0.64 MG/DL (ref 0.2–1)
BILIRUB UR QL STRIP: NEGATIVE
BUN SERPL-MCNC: 16 MG/DL (ref 5–25)
CALCIUM SERPL-MCNC: 9.6 MG/DL (ref 8.4–10.2)
CHLORIDE SERPL-SCNC: 103 MMOL/L (ref 96–108)
CHOLEST SERPL-MCNC: 207 MG/DL (ref ?–200)
CLARITY UR: CLEAR
CO2 SERPL-SCNC: 27 MMOL/L (ref 21–32)
COLOR UR: NORMAL
CREAT SERPL-MCNC: 0.84 MG/DL (ref 0.6–1.3)
EOSINOPHIL # BLD AUTO: 0.18 THOUSAND/ÂΜL (ref 0–0.61)
EOSINOPHIL NFR BLD AUTO: 2 % (ref 0–6)
ERYTHROCYTE [DISTWIDTH] IN BLOOD BY AUTOMATED COUNT: 12.9 % (ref 11.6–15.1)
GFR SERPL CREATININE-BSD FRML MDRD: 81 ML/MIN/1.73SQ M
GLUCOSE P FAST SERPL-MCNC: 90 MG/DL (ref 65–99)
GLUCOSE UR STRIP-MCNC: NEGATIVE MG/DL
HCT VFR BLD AUTO: 45.6 % (ref 36.5–49.3)
HDLC SERPL-MCNC: 55 MG/DL
HGB BLD-MCNC: 15.1 G/DL (ref 12–17)
HGB UR QL STRIP.AUTO: NEGATIVE
IMM GRANULOCYTES # BLD AUTO: 0.06 THOUSAND/UL (ref 0–0.2)
IMM GRANULOCYTES NFR BLD AUTO: 1 % (ref 0–2)
KETONES UR STRIP-MCNC: NEGATIVE MG/DL
LDLC SERPL CALC-MCNC: 137 MG/DL (ref 0–100)
LEUKOCYTE ESTERASE UR QL STRIP: NEGATIVE
LYMPHOCYTES # BLD AUTO: 1.64 THOUSANDS/ÂΜL (ref 0.6–4.47)
LYMPHOCYTES NFR BLD AUTO: 18 % (ref 14–44)
MCH RBC QN AUTO: 32.9 PG (ref 26.8–34.3)
MCHC RBC AUTO-ENTMCNC: 33.1 G/DL (ref 31.4–37.4)
MCV RBC AUTO: 99 FL (ref 82–98)
MONOCYTES # BLD AUTO: 0.9 THOUSAND/ÂΜL (ref 0.17–1.22)
MONOCYTES NFR BLD AUTO: 10 % (ref 4–12)
NEUTROPHILS # BLD AUTO: 6.08 THOUSANDS/ÂΜL (ref 1.85–7.62)
NEUTS SEG NFR BLD AUTO: 69 % (ref 43–75)
NITRITE UR QL STRIP: NEGATIVE
NONHDLC SERPL-MCNC: 152 MG/DL
NRBC BLD AUTO-RTO: 0 /100 WBCS
PH UR STRIP.AUTO: 6 [PH]
PLATELET # BLD AUTO: 225 THOUSANDS/UL (ref 149–390)
PMV BLD AUTO: 11.3 FL (ref 8.9–12.7)
POTASSIUM SERPL-SCNC: 5.6 MMOL/L (ref 3.5–5.3)
PROT SERPL-MCNC: 7.1 G/DL (ref 6.4–8.4)
PROT UR STRIP-MCNC: NEGATIVE MG/DL
PSA SERPL-MCNC: 2.87 NG/ML (ref 0–4)
RBC # BLD AUTO: 4.59 MILLION/UL (ref 3.88–5.62)
SODIUM SERPL-SCNC: 138 MMOL/L (ref 135–147)
SP GR UR STRIP.AUTO: 1.01 (ref 1–1.03)
TRIGL SERPL-MCNC: 77 MG/DL (ref ?–150)
UROBILINOGEN UR STRIP-ACNC: <2 MG/DL
WBC # BLD AUTO: 8.9 THOUSAND/UL (ref 4.31–10.16)

## 2025-04-18 PROCEDURE — 36415 COLL VENOUS BLD VENIPUNCTURE: CPT

## 2025-04-18 PROCEDURE — G0103 PSA SCREENING: HCPCS

## 2025-04-18 PROCEDURE — 85025 COMPLETE CBC W/AUTO DIFF WBC: CPT

## 2025-04-18 PROCEDURE — 80061 LIPID PANEL: CPT

## 2025-04-18 PROCEDURE — 81003 URINALYSIS AUTO W/O SCOPE: CPT

## 2025-04-18 PROCEDURE — 80053 COMPREHEN METABOLIC PANEL: CPT

## 2025-04-21 ENCOUNTER — OFFICE VISIT (OUTPATIENT)
Dept: PHYSICAL THERAPY | Facility: CLINIC | Age: 85
End: 2025-04-21
Payer: COMMERCIAL

## 2025-04-21 DIAGNOSIS — M75.102 LEFT ROTATOR CUFF TEAR ARTHROPATHY: Primary | ICD-10-CM

## 2025-04-21 DIAGNOSIS — M12.812 LEFT ROTATOR CUFF TEAR ARTHROPATHY: Primary | ICD-10-CM

## 2025-04-21 PROCEDURE — 97110 THERAPEUTIC EXERCISES: CPT

## 2025-04-21 PROCEDURE — 97140 MANUAL THERAPY 1/> REGIONS: CPT

## 2025-04-21 NOTE — PROGRESS NOTES
Daily Note     Today's date: 2025  Patient name: Patrick Frey  : 1940  MRN: [id number]  Referring provider: Bryanna Muhammad MD  Dx:   Encounter Diagnosis     ICD-10-CM    1. Left rotator cuff tear arthropathy  M75.102     M12.812                      Subjective: pt states that continues to have trouble with supine flexion with weight.  He also states that getting things like coffee cup to mouth, he needs to move angle of his arm to be able to do it.  He is having carpel tunnel release in May bilaterally.       Objective: See treatment diary below      Assessment: Tolerated treatment with minimal tenderness noted anterior shoulder noted most at infra.  . Patient was challenged by side lying flex to h abd, but completed 10 reps with 3# weight.        Plan: Continue per plan of care.      Precautions: none      Manuals 3/31 4/2 4/7 4/21         Left pec, infra, subscap stm DB DB DB DL         Inf, and post mobs  Grade 3 DB DB DL                                   Neuro Re-Ed             S/l ER nv 2x10           Supine circles 2x10 ea            S/l abd 2x10 2x10           Ball blessing nv                                                   Ther Ex             [place] nv            Lat/subscap foam nv            Shld rows at 45 nv            Prone Y and T  2x10 3# 2x10 3#  2x10         Supine H abd  3# 2x10           Supine scaption  3# 2x10           S/l flex to H abd   3# 2x10 3#  2x10         clocks   Red x10 Red x12         Ther Activity                                       Gait Training                                       Modalities

## 2025-04-23 ENCOUNTER — OFFICE VISIT (OUTPATIENT)
Dept: PHYSICAL THERAPY | Facility: CLINIC | Age: 85
End: 2025-04-23
Payer: COMMERCIAL

## 2025-04-23 DIAGNOSIS — M12.812 LEFT ROTATOR CUFF TEAR ARTHROPATHY: Primary | ICD-10-CM

## 2025-04-23 DIAGNOSIS — M75.102 LEFT ROTATOR CUFF TEAR ARTHROPATHY: Primary | ICD-10-CM

## 2025-04-23 PROCEDURE — 97140 MANUAL THERAPY 1/> REGIONS: CPT

## 2025-04-23 PROCEDURE — 97110 THERAPEUTIC EXERCISES: CPT

## 2025-04-23 RX ORDER — COVID-19 ANTIGEN TEST
KIT MISCELLANEOUS AS NEEDED
COMMUNITY

## 2025-04-23 RX ORDER — DIPHENOXYLATE HYDROCHLORIDE AND ATROPINE SULFATE 2.5; .025 MG/1; MG/1
1 TABLET ORAL DAILY
COMMUNITY

## 2025-04-23 NOTE — PROGRESS NOTES
Daily Note     Today's date: 2025  Patient name: Patrick Frey  : 1940  MRN: [id number]  Referring provider: Bryanna Muhammad MD  Dx:   Encounter Diagnosis     ICD-10-CM    1. Left rotator cuff tear arthropathy  M75.102     M12.812           Start Time: 737  Stop Time: 815  Total time in clinic (min): 38 minutes    Subjective: Patient continues to note compensatory movement patterns with ADL's.       Objective: See treatment diary below      Assessment: Passively, rom is WFL but does present with limited soft tissue mobility of RTC muscles. Tactile cuing utilized throughout session for form maintenance with therex. Fatigues quickly with strengthening in gravity eliminated positions. Patient would benefit from continued PT to improve level of function.       Plan: Continue per POC. Increase reps/resistance as tolerated.      Precautions: none      Manuals 3/31 4/2 4/7 4/21 4/23        Left pec, infra, subscap stm DB DB DB DL MS        Inf, and post mobs  Grade 3 DB DB DL MS glides                                  Neuro Re-Ed             S/l ER nv 2x10   2x10        Supine circles 2x10 ea            S/l abd 2x10 2x10   2x10        Ball blessing nv                                                   Ther Ex             [place]win nv            Lat/subscap foam nv            Shld rows at 45 nv            Prone Y and T  2x10 3# 2x10 3#  2x10 3#  3x10        Supine H abd  3# 2x10           Supine scaption  3# 2x10           S/l flex to H abd   3# 2x10 3#  2x10 3#  2x10        clocks   Red x10 Red x12 Red x12        Ther Activity                                       Gait Training                                       Modalities

## 2025-04-23 NOTE — PRE-PROCEDURE INSTRUCTIONS
Pre-Surgery Instructions:   Medication Instructions    ezetimibe (ZETIA) 10 mg tablet Take day of surgery.    lisinopril (ZESTRIL) 20 mg tablet Hold day of surgery.    Multiple Vitamins-Minerals (ZINC PO) Stop taking 7 days prior to surgery.    multivitamin (THERAGRAN) TABS Stop taking 7 days prior to surgery.    Naproxen Sodium (Aleve) 220 MG CAPS Stop taking 3 days prior to surgery.   Medication instructions for day of surgery reviewed. Please take all instructed medications with only a sip of water.       You will receive a call one business day prior to surgery with an arrival time and hospital directions. If your surgery is scheduled on a Monday, the hospital will be calling you on the Friday prior to your surgery. If you have not heard from anyone by 8pm, please call the hospital supervisor through the hospital  at [phone]. ([place] [phone] or [place] [phone]).    Do not eat or drink anything after midnight the night before your surgery, including candy, mints, lifesavers, or chewing gum. Do not drink alcohol 24hrs before your surgery. Try not to smoke at least 24hrs before your surgery.       Follow the pre surgery showering instructions as listed in the “My Surgical Experience Booklet” or otherwise provided by your surgeon's office. Do not use a blade to shave the surgical area 1 week before surgery. It is okay to use a clean electric clippers up to 24 hours before surgery. Do not apply any lotions, creams, including makeup, cologne, deodorant, or perfumes after showering on the day of your surgery. Do not use dry shampoo, hair spray, hair gel, or any type of hair products.     No contact lenses, eye make-up, or artificial eyelashes. Remove nail polish, including gel polish, and any artificial, gel, or acrylic nails if possible. Remove all jewelry including rings and body piercing jewelry.     Wear causal clothing that is easy to take on and off. Consider your type of  surgery.    Keep any valuables, jewelry, piercings at home. Please bring any specially ordered equipment (sling, braces) if indicated.    Arrange for a responsible person to drive you to and from the hospital on the day of your surgery. Please confirm the visitor policy for the day of your procedure when you receive your phone call with an arrival time.     Call the surgeon's office with any new illnesses, exposures, or additional questions prior to surgery.    Please reference your “My Surgical Experience Booklet” for additional information to prepare for your upcoming surgery.

## 2025-04-28 ENCOUNTER — OFFICE VISIT (OUTPATIENT)
Dept: PHYSICAL THERAPY | Facility: CLINIC | Age: 85
End: 2025-04-28
Payer: COMMERCIAL

## 2025-04-28 DIAGNOSIS — M75.102 LEFT ROTATOR CUFF TEAR ARTHROPATHY: Primary | ICD-10-CM

## 2025-04-28 DIAGNOSIS — M12.812 LEFT ROTATOR CUFF TEAR ARTHROPATHY: Primary | ICD-10-CM

## 2025-04-28 PROCEDURE — 97110 THERAPEUTIC EXERCISES: CPT

## 2025-04-28 PROCEDURE — 97140 MANUAL THERAPY 1/> REGIONS: CPT

## 2025-04-28 NOTE — PROGRESS NOTES
Daily Note     Today's date: 2025  Patient name: Patrick Frey  : 1940  MRN: [id number]  Referring provider: Bryanna Muhammad MD  Dx:   Encounter Diagnosis     ICD-10-CM    1. Left rotator cuff tear arthropathy  M75.102     M12.812                      Subjective: pt states that he continues to do well with all exercises except overhead and bent elbow ER      Objective: See treatment diary below      Assessment: Tolerated treatment with minimal tightness and good PROM in left shoulder.  Still with significant weakness in ER motion, able to perform actively but unable to perform with any resistance at this time.. Patient exhibited good technique with therapeutic exercises      Plan: Potential discharge next visit. As he will be having carpal tunnel surgery on both hands 2 weeks apart.      Precautions: none      Manuals 3/31 4/2 4/7 4/21 4/23 4/28       Left pec, infra, subscap stm DB DB DB DL MS DL       Inf, and post mobs  Grade 3 DB DB DL MS glides DL glides                                 Neuro Re-Ed             S/l ER nv 2x10   2x10 2x10       Supine circles 2x10 ea            S/l abd 2x10 2x10   2x10        Ball blessing nv                                                   Ther Ex             Supine scap with band nv     Gtb x15       Lat/subscap foam nv            Shld rows at 45 nv            Prone Y and T  2x10 3# 2x10 3#  2x10 3#  3x10 3#  2x10       Supine H abd  3# 2x10           Supine scaption  3# 2x10    PROM-DL       S/l flex to H abd   3# 2x10 3#  2x10 3#  2x10 3#  2x10       clocks   Red x10 Red x12 Red x12 Red x12       Ther Activity                                       Gait Training                                       Modalities

## 2025-04-30 ENCOUNTER — OFFICE VISIT (OUTPATIENT)
Dept: PHYSICAL THERAPY | Facility: CLINIC | Age: 85
End: 2025-04-30
Payer: COMMERCIAL

## 2025-04-30 DIAGNOSIS — M75.102 LEFT ROTATOR CUFF TEAR ARTHROPATHY: Primary | ICD-10-CM

## 2025-04-30 DIAGNOSIS — M12.812 LEFT ROTATOR CUFF TEAR ARTHROPATHY: Primary | ICD-10-CM

## 2025-04-30 PROCEDURE — 97140 MANUAL THERAPY 1/> REGIONS: CPT | Performed by: PHYSICAL THERAPIST

## 2025-04-30 PROCEDURE — 97110 THERAPEUTIC EXERCISES: CPT | Performed by: PHYSICAL THERAPIST

## 2025-04-30 NOTE — PROGRESS NOTES
Daily Note     Today's date: 2025  Patient name: Patrick Frey  : 1940  MRN: [id number]  Referring provider: Bryanna Muhammad MD  Dx:   Encounter Diagnosis     ICD-10-CM    1. Left rotator cuff tear arthropathy  M75.102     M12.812                      Subjective: pt notes that overall he is doing okay. His shoulder was burning yesterday along the back/top of the shoulder but better today. He notes that he will be taking time off from PT for the shoulder has he is having hand surgery and has to start hand therapy for that.       Objective: See treatment diary below      Assessment: pt doing well with his exercises. And is independent with them. Pt advised that he can return to PT for the shoulder for updated exericses prior to returning to [place] for the summer. And after rehab for his hands.       Plan: Hold therapy at this time     Precautions: none      Manuals 3/31 4/2 4/7 4/21 4/23 4/28 4/30      Left pec, infra, subscap stm DB DB DB DL MS DL DB      Inf, and post mobs  Grade 3 DB DB DL MS glides DL glides DB                                Neuro Re-Ed             S/l ER nv 2x10   2x10 2x10 3x10      Supine circles 2x10 ea            S/l abd 2x10 2x10   2x10  2x10      Ball blessing nv                                                   Ther Ex             Supine scap with band nv     Gtb x15       Lat/subscap foam nv            Shld rows at 45 nv            Prone Y and T  2x10 3# 2x10 3#  2x10 3#  3x10 3#  2x10       Supine H abd  3# 2x10           Supine scaption  3# 2x10    PROM-DL Prom DB       S/l flex to H abd   3# 2x10 3#  2x10 3#  2x10 3#  2x10       clocks   Red x10 Red x12 Red x12 Red x12 Red x12      Ther Activity                                       Gait Training                                       Modalities

## 2025-05-01 ENCOUNTER — ANESTHESIA EVENT (OUTPATIENT)
Dept: PERIOP | Facility: HOSPITAL | Age: 85
End: 2025-05-01
Payer: COMMERCIAL

## 2025-05-01 NOTE — DISCHARGE INSTR - AVS FIRST PAGE

## 2025-05-02 ENCOUNTER — ANESTHESIA (OUTPATIENT)
Dept: PERIOP | Facility: HOSPITAL | Age: 85
End: 2025-05-02
Payer: COMMERCIAL

## 2025-05-02 ENCOUNTER — HOSPITAL ENCOUNTER (OUTPATIENT)
Facility: HOSPITAL | Age: 85
Setting detail: OUTPATIENT SURGERY
Discharge: HOME | End: 2025-05-02
Attending: ORTHOPAEDIC SURGERY | Admitting: ORTHOPAEDIC SURGERY
Payer: COMMERCIAL

## 2025-05-02 VITALS
WEIGHT: 142 LBS | OXYGEN SATURATION: 97 % | TEMPERATURE: 98.2 F | HEART RATE: 62 BPM | BODY MASS INDEX: 21.52 KG/M2 | DIASTOLIC BLOOD PRESSURE: 90 MMHG | HEIGHT: 68 IN | RESPIRATION RATE: 12 BRPM | SYSTOLIC BLOOD PRESSURE: 186 MMHG

## 2025-05-02 PROCEDURE — 64721 CARPAL TUNNEL SURGERY: CPT | Performed by: PHYSICIAN ASSISTANT

## 2025-05-02 PROCEDURE — 64721 CARPAL TUNNEL SURGERY: CPT | Performed by: ORTHOPAEDIC SURGERY

## 2025-05-02 RX ORDER — LIDOCAINE HYDROCHLORIDE 10 MG/ML
INJECTION, SOLUTION EPIDURAL; INFILTRATION; INTRACAUDAL; PERINEURAL AS NEEDED
Status: DISCONTINUED | OUTPATIENT
Start: 2025-05-02 | End: 2025-05-02

## 2025-05-02 RX ORDER — FENTANYL CITRATE/PF 50 MCG/ML
25 SYRINGE (ML) INJECTION
Status: DISCONTINUED | OUTPATIENT
Start: 2025-05-02 | End: 2025-05-02 | Stop reason: HOSPADM

## 2025-05-02 RX ORDER — DEXAMETHASONE SODIUM PHOSPHATE 10 MG/ML
INJECTION, SOLUTION INTRAMUSCULAR; INTRAVENOUS AS NEEDED
Status: DISCONTINUED | OUTPATIENT
Start: 2025-05-02 | End: 2025-05-02

## 2025-05-02 RX ORDER — ONDANSETRON 2 MG/ML
INJECTION INTRAMUSCULAR; INTRAVENOUS AS NEEDED
Status: DISCONTINUED | OUTPATIENT
Start: 2025-05-02 | End: 2025-05-02

## 2025-05-02 RX ORDER — FENTANYL CITRATE 50 UG/ML
INJECTION, SOLUTION INTRAMUSCULAR; INTRAVENOUS AS NEEDED
Status: DISCONTINUED | OUTPATIENT
Start: 2025-05-02 | End: 2025-05-02

## 2025-05-02 RX ORDER — PROPOFOL 10 MG/ML
INJECTION, EMULSION INTRAVENOUS AS NEEDED
Status: DISCONTINUED | OUTPATIENT
Start: 2025-05-02 | End: 2025-05-02

## 2025-05-02 RX ORDER — SODIUM CHLORIDE, SODIUM LACTATE, POTASSIUM CHLORIDE, CALCIUM CHLORIDE 600; 310; 30; 20 MG/100ML; MG/100ML; MG/100ML; MG/100ML
20 INJECTION, SOLUTION INTRAVENOUS CONTINUOUS
Status: DISCONTINUED | OUTPATIENT
Start: 2025-05-02 | End: 2025-05-02 | Stop reason: HOSPADM

## 2025-05-02 RX ORDER — SODIUM CHLORIDE, SODIUM LACTATE, POTASSIUM CHLORIDE, CALCIUM CHLORIDE 600; 310; 30; 20 MG/100ML; MG/100ML; MG/100ML; MG/100ML
INJECTION, SOLUTION INTRAVENOUS CONTINUOUS PRN
Status: DISCONTINUED | OUTPATIENT
Start: 2025-05-02 | End: 2025-05-02

## 2025-05-02 RX ORDER — ONDANSETRON 2 MG/ML
4 INJECTION INTRAMUSCULAR; INTRAVENOUS ONCE
Status: DISCONTINUED | OUTPATIENT
Start: 2025-05-02 | End: 2025-05-02 | Stop reason: HOSPADM

## 2025-05-02 RX ORDER — MAGNESIUM HYDROXIDE 1200 MG/15ML
LIQUID ORAL AS NEEDED
Status: DISCONTINUED | OUTPATIENT
Start: 2025-05-02 | End: 2025-05-02 | Stop reason: HOSPADM

## 2025-05-02 RX ORDER — CEFAZOLIN SODIUM 2 G/50ML
2000 SOLUTION INTRAVENOUS ONCE
Status: COMPLETED | OUTPATIENT
Start: 2025-05-02 | End: 2025-05-02

## 2025-05-02 RX ORDER — PROPOFOL 10 MG/ML
INJECTION, EMULSION INTRAVENOUS CONTINUOUS PRN
Status: DISCONTINUED | OUTPATIENT
Start: 2025-05-02 | End: 2025-05-02

## 2025-05-02 RX ADMIN — PROPOFOL 120 MCG/KG/MIN: 10 INJECTION, EMULSION INTRAVENOUS at 14:01

## 2025-05-02 RX ADMIN — SODIUM CHLORIDE, SODIUM LACTATE, POTASSIUM CHLORIDE, AND CALCIUM CHLORIDE: .6; .31; .03; .02 INJECTION, SOLUTION INTRAVENOUS at 13:48

## 2025-05-02 RX ADMIN — DEXAMETHASONE SODIUM PHOSPHATE 10 MG: 10 INJECTION, SOLUTION INTRAMUSCULAR; INTRAVENOUS at 14:22

## 2025-05-02 RX ADMIN — FENTANYL CITRATE 50 MCG: 50 INJECTION, SOLUTION INTRAMUSCULAR; INTRAVENOUS at 13:51

## 2025-05-02 RX ADMIN — FENTANYL CITRATE 25 MCG: 50 INJECTION, SOLUTION INTRAMUSCULAR; INTRAVENOUS at 14:43

## 2025-05-02 RX ADMIN — FENTANYL CITRATE 25 MCG: 50 INJECTION, SOLUTION INTRAMUSCULAR; INTRAVENOUS at 14:17

## 2025-05-02 RX ADMIN — PROPOFOL 150 MG: 10 INJECTION, EMULSION INTRAVENOUS at 13:58

## 2025-05-02 RX ADMIN — ONDANSETRON 4 MG: 2 INJECTION INTRAMUSCULAR; INTRAVENOUS at 13:51

## 2025-05-02 RX ADMIN — CEFAZOLIN SODIUM 2000 MG: 2 SOLUTION INTRAVENOUS at 13:51

## 2025-05-02 RX ADMIN — LIDOCAINE HYDROCHLORIDE 50 MG: 10 INJECTION, SOLUTION EPIDURAL; INFILTRATION; INTRACAUDAL; PERINEURAL at 13:58

## 2025-05-02 NOTE — ANESTHESIA POSTPROCEDURE EVALUATION
Post-Op Assessment Note    CV Status:  Stable  Pain Score: 0    Pain management: adequate       Mental Status:  Awake and alert   Hydration Status:  Stable   PONV Controlled:  None   Airway Patency:  Patent     Post Op Vitals Reviewed: Yes    No anethesia notable event occurred.    Staff: CRNA           Last Filed PACU Vitals:  Vitals Value Taken Time   Temp 97.9 °F (36.6 °C) 05/02/25 1450   Pulse 60 05/02/25 1451   /69 05/02/25 1451   Resp 9 05/02/25 1451   SpO2 95 % 05/02/25 1451   Vitals shown include unfiled device data.    Modified Roberto:     Vitals Value Taken Time   Activity 2 05/02/25 1450   Respiration 2 05/02/25 1450   Circulation 2 05/02/25 1450   Consciousness 1 05/02/25 1450   Oxygen Saturation 2 05/02/25 1450     Modified Roberto Score: 9

## 2025-05-02 NOTE — OP NOTE
OPERATIVE REPORT  PATIENT NAME: Patrick Frey    :  1940  MRN: [id number]  Pt Location: [facility] OR ROOM 02    SURGERY DATE: 2025    Surgeons and Role:     * Radha Maher MD - Primary     * Ronit De Jesus PA-C - Assisting    Preop Diagnosis:  Carpal tunnel syndrome, bilateral [G56.03]    Post-Op Diagnosis Codes:     * Carpal tunnel syndrome, bilateral [G56.03]    Procedure(s):  Left - RELEASE CARPAL TUNNEL - Revision Left carpal tunnel release    Specimen(s):  * No specimens in log *    Estimated Blood Loss:   Minimal    Drains:  * No LDAs found *    Anesthesia Type:   IV Sedation with Anesthesia    Operative Indications:  Carpal tunnel syndrome, bilateral [G56.03]  Left addressed today    Operative Findings:  Extensive scar tissue related to previous CTR.  Median nerve compressed through distal forearm fascia and carpal canal  Scar tissue was adherent to the epineurium and releasing the fascia thereby released the epineurium.  The nerve also appeared to have undergone  internal neurolysis as individual fascicular bundles were visualized following the release.  An internal neurolysis was not performed during this procedure.  This was simply the best description of the appearance of the nerve.      Complications:   None    Procedure and Technique:  The patient was correctly identified in the preanesthesia holding area.  The operative site was identified and marked with a marker.  We reviewed the informed consent which included the planned surgical procedure, risk, benefits, alternatives, as well as, postoperative care expectations.  Questions were answered to the patient satisfaction.  The patient voluntarily signed informed consent.    The patient was taken back to the operating room.  The patient remained on the gurney with a hand table attached to the gurney.  I applied tourniquet to the operative extremity.  The anesthesiologist sedated the patient and secured the airway.  Prophylactic  antibiotics were administered intravenously.  I prepped the skin with alcohol and injected local into the planned surgical site.  The operative extremity was prepped and draped in a sterile fashion.  A timeout was performed.  I used a marker to plan the surgical incision for an extended carpal tunnel release.  Next, I elevated the upper extremity, applied an Esmarch, and inflated the tourniquet to 250 mmHg.  I used a #15 blade to make the skin incision.  I carefully dissected to the level of the palmar fascia and distal forearm fascia.  There was extensive scar tissue in the wrist/palm of the hand.  I positioned retractors to allow for direct visualization of the palmar fascia and distal forearm fascia.  I identified the median nerve between the scar of the palmar fascia and the distal forearm fascia.  I incised the scarred palmar fascia using a #15 blade.  I repositioned the retractors to allow for direct visualization of the  scar of the transverse carpal ligament.  I used a #15 blade to incise the transverse carpal ligament along its ulnar border, thereby, creating a radially based flap.  The distal extent of the release was marked by the presence of fat.  I continued the release proximally using a #15 blade to release the distal forearm fascia compressing the median nerve.  I evaluated the nerve along its length.  There were no areas of compression.  The nerve was pale.  Of note, the scar tissue was adherent to the epineurium and releasing the fascia thereby released the epineurium.  The nerve also appeared to have undergone  internal neurolysis as individual fascicular bundles were visualized following the release.  An internal neurolysis was not performed during this procedure.  This was simply the best description of the appearance of the nerve.    I irrigated the wound with normal saline delivered through bulb syringe.  The tourniquet was deflated at 19 minutes.  Hemostasis was achieved using electrocautery  and direct pressure.  I approximated the skin using 4-0 nylon in horizontal mattress pattern.  A sterile dressing and a volar splint were applied to the operative extremity.  The patient tolerated the procedure well and was taken to the postanesthesia care unit in stable condition.  I, Radha Mhaer, performed the entire procedure. A qualified resident physician was not available. and A physician assistant was required during the procedure for retraction, tissue handling, dissection and suturing.    Postoperative plan:   The patient was instructed on activity limitations, elevation, and use of ice for pain management.    Pain management will include the use of over-the-counter Tylenol and ibuprofen.  The patient was given instructions on appropriate dosing and timing for postoperative use of Tylenol and ibuprofen.    The patient will follow-up in 7 to 10 days for removal of the splint, sutures, and to receive a home exercise program to include scar massage and nerve glide exercises.        Patient Disposition:  PACU              SIGNATURE: Radha Maher MD  DATE: May 2, 2025  TIME: 11:56 AM

## 2025-05-02 NOTE — ANESTHESIA PREPROCEDURE EVALUATION
Procedure:  RELEASE CARPAL TUNNEL - Revision Left carpal tunnel release (Left: Wrist)    Relevant Problems   CARDIO   (+) Hyperlipidemia   (+) Hypertension      MUSCULOSKELETAL   (+) Generalized osteoarthritis   (+) Localized primary osteoarthritis of right elbow      NEURO/PSYCH   (+) Numbness and tingling in left hand        Physical Exam    Airway    Mallampati score: III  TM Distance: >3 FB  Neck ROM: full     Dental   No notable dental hx     Cardiovascular      Pulmonary      Other Findings        Anesthesia Plan  ASA Score- 2     Anesthesia Type- general with ASA Monitors.         Additional Monitors:     Airway Plan:     Comment: GA with LMA, IV, antiemetics.       Plan Factors-    Chart reviewed.    Patient summary reviewed.    Patient is not a current smoker.              Induction- intravenous.    Postoperative Plan-         Informed Consent- Anesthetic plan and risks discussed with patient.  I personally reviewed this patient with the CRNA. Discussed and agreed on the Anesthesia Plan with the CRNA..      NPO Status:  No vitals data found for the desired time range.

## 2025-05-02 NOTE — INTERVAL H&P NOTE
H&P reviewed. After examining the patient I find no changes in the patients condition since the H&P had been written.    Vitals:    05/02/25 1206   BP: 168/77   Pulse: 71   Resp: 15   Temp: 97.5 °F (36.4 °C)   SpO2: 94%

## 2025-05-03 NOTE — ANESTHESIA POSTPROCEDURE EVALUATION
Post-Op Assessment Note    CV Status:  Stable  Pain Score: 0         Mental Status:  Alert and awake   Hydration Status:  Stable   PONV Controlled:  None   Airway Patency:  Patent     Post Op Vitals Reviewed: Yes    No anethesia notable event occurred.    Staff: Anesthesiologist           Last Filed PACU Vitals:  Vitals Value Taken Time   Temp 98.2 °F (36.8 °C) 05/02/25 1520   Pulse 64 05/02/25 1520   /90 05/02/25 1520   Resp 12 05/02/25 1520   SpO2 97 % 05/02/25 1520       Modified Roberto:     Vitals Value Taken Time   Activity 2 05/02/25 1510   Respiration 2 05/02/25 1510   Circulation 2 05/02/25 1510   Consciousness 2 05/02/25 1510   Oxygen Saturation 2 05/02/25 1510     Modified Roberto Score: 10

## 2025-05-08 DIAGNOSIS — E78.5 HYPERLIPIDEMIA, UNSPECIFIED HYPERLIPIDEMIA TYPE: Primary | ICD-10-CM

## 2025-05-08 RX ORDER — EZETIMIBE 10 MG/1
10 TABLET ORAL DAILY
Qty: 90 TABLET | Refills: 1 | Status: SHIPPED | OUTPATIENT
Start: 2025-05-08

## 2025-05-08 RX ORDER — ROSUVASTATIN CALCIUM 5 MG/1
5 TABLET, COATED ORAL DAILY
Qty: 90 TABLET | Refills: 1 | Status: SHIPPED | OUTPATIENT
Start: 2025-05-08

## 2025-05-09 ENCOUNTER — EVALUATION (OUTPATIENT)
Dept: OCCUPATIONAL THERAPY | Facility: CLINIC | Age: 85
End: 2025-05-09
Attending: ORTHOPAEDIC SURGERY
Payer: COMMERCIAL

## 2025-05-09 ENCOUNTER — TELEPHONE (OUTPATIENT)
Age: 85
End: 2025-05-09

## 2025-05-09 DIAGNOSIS — G56.03 CARPAL TUNNEL SYNDROME, BILATERAL: ICD-10-CM

## 2025-05-09 PROCEDURE — 97165 OT EVAL LOW COMPLEX 30 MIN: CPT | Performed by: OCCUPATIONAL THERAPIST

## 2025-05-09 NOTE — PROGRESS NOTES
OT Evaluation     Today's date: 2025  Patient name: Patrick Frey  : 1940  MRN: [id number]  Referring provider: Radha Maher,*  Dx:   Encounter Diagnosis     ICD-10-CM    1. Carpal tunnel syndrome, bilateral  G56.03 Ambulatory Referral to PT/OT Hand Therapy                     Assessment  Impairments: lacks appropriate home exercise program  Functional limitations: Pt. will gradually return to full funciton of L hand.  Symptom irritability: low    Assessment details: Pt. Presented today for suture removal and HEP.  Pt has no pain and full AROM of the hand.  His strength is slowly returning, remains slightly limited with his sensation of the median N. Return.  Pt. Is very happy with his recover and motivated to return to normal activities.  Wound was reinforced with steristrips and tubigrip for compression. Pt. Given HEP for ROM and strengthening, educated on wound care and scar management.    Understanding of Dx/Px/POC: excellent     Prognosis: excellent    Plan  Patient would benefit from: OT eval    Planned therapy interventions: home exercise program    Frequency: 1x week  Plan of Care beginning date: 2025  Plan of Care expiration date: 2025  Treatment plan discussed with: patient        Subjective Evaluation    History of Present Illness  Mechanism of injury: Pt. S/p L Carpal tunnel release for suture removal.    Quality of life: good    Patient Goals  Patient goals for therapy: return to work    Pain  Current pain ratin  At worst pain ratin  Progression: improved    Social Support  Lives in: multiple-level home  Lives with: spouse    Employment status: not working  Hand dominance: right    Treatments  Current treatment: occupational therapy        Objective     Neurological Testing     Sensation     Wrist/Hand   Left   Diminished: light touch    Comments   Left light touch: median N.     Active Range of Motion     Left Wrist   Normal active range of motion    Left Thumb    Kapandji score: 9 degrees      Additional Active Range of Motion Details  Full AROM of the hand    Strength/Myotome Testing     Left Wrist/Hand   Normal wrist strength     (2nd hand position)     Trial 1: 35    Thumb Strength  Key/Lateral Pinch     Trial 1: 15  Tip/Two-Point Pinch     Trial 1: 12  Palmar/Three-Point Pinch     Trial 1: 12    Right Wrist/Hand      (2nd hand position)     Trial 1: 55            Daily Treatment Diary     Precautions: Wound care  CO-MORBIDITIES:standard  HEP ACCESS CODE: CT post op  FOTO Completed On:     POC Expires Reeval for Medicare to be completed  Auth Expiration Date PT/OT/STVisit Limit    By visit       Completed on visit                  Auth Status DATE 5/9        NA Visit # 1         Remaining         MANUAL THERAPY IE 30'                                             Suture removed, steristrips                 THERAPEUTIC EXERCISE HEP Carpal tunnel HEP, wound care                                                                                                                                                              NEUROMUSCULAR REEDUCATION                                                                                                                                                       THERAPEUTIC ACTIVITY                                                                                                    MODALITIES

## 2025-05-09 NOTE — TELEPHONE ENCOUNTER
Pt wife called for clarification on medicationsezetimibe (ZETIA) 10 mg tablet, and  rosuvastatin (CRESTOR) 5 mg tablet. She is confused on the instructions. Pleas contact pt as soon as possible, Pt will not be avail today until 2pm.

## 2025-05-13 ENCOUNTER — HOSPITAL ENCOUNTER (OUTPATIENT)
Facility: HOSPITAL | Age: 85
Setting detail: OUTPATIENT SURGERY
Discharge: HOME | End: 2025-05-13
Attending: ORTHOPAEDIC SURGERY | Admitting: ORTHOPAEDIC SURGERY
Payer: COMMERCIAL

## 2025-05-13 ENCOUNTER — ANESTHESIA EVENT (OUTPATIENT)
Dept: PERIOP | Facility: HOSPITAL | Age: 85
End: 2025-05-13
Payer: COMMERCIAL

## 2025-05-13 ENCOUNTER — ANESTHESIA (OUTPATIENT)
Dept: PERIOP | Facility: HOSPITAL | Age: 85
End: 2025-05-13
Payer: COMMERCIAL

## 2025-05-13 VITALS
DIASTOLIC BLOOD PRESSURE: 69 MMHG | WEIGHT: 144 LBS | HEART RATE: 78 BPM | OXYGEN SATURATION: 96 % | SYSTOLIC BLOOD PRESSURE: 119 MMHG | BODY MASS INDEX: 21.82 KG/M2 | TEMPERATURE: 98.2 F | RESPIRATION RATE: 12 BRPM | HEIGHT: 68 IN

## 2025-05-13 PROCEDURE — 64721 CARPAL TUNNEL SURGERY: CPT | Performed by: ORTHOPAEDIC SURGERY

## 2025-05-13 PROCEDURE — 64721 CARPAL TUNNEL SURGERY: CPT

## 2025-05-13 PROCEDURE — 10120 INC&RMVL FB SUBQ TISS SMPL: CPT

## 2025-05-13 PROCEDURE — 10120 INC&RMVL FB SUBQ TISS SMPL: CPT | Performed by: ORTHOPAEDIC SURGERY

## 2025-05-13 RX ORDER — ONDANSETRON 2 MG/ML
INJECTION INTRAMUSCULAR; INTRAVENOUS AS NEEDED
Status: DISCONTINUED | OUTPATIENT
Start: 2025-05-13 | End: 2025-05-13

## 2025-05-13 RX ORDER — DEXAMETHASONE SODIUM PHOSPHATE 10 MG/ML
INJECTION, SOLUTION INTRAMUSCULAR; INTRAVENOUS AS NEEDED
Status: DISCONTINUED | OUTPATIENT
Start: 2025-05-13 | End: 2025-05-13

## 2025-05-13 RX ORDER — SODIUM CHLORIDE, SODIUM LACTATE, POTASSIUM CHLORIDE, CALCIUM CHLORIDE 600; 310; 30; 20 MG/100ML; MG/100ML; MG/100ML; MG/100ML
100 INJECTION, SOLUTION INTRAVENOUS CONTINUOUS
Status: DISCONTINUED | OUTPATIENT
Start: 2025-05-13 | End: 2025-05-13 | Stop reason: HOSPADM

## 2025-05-13 RX ORDER — PROPOFOL 10 MG/ML
INJECTION, EMULSION INTRAVENOUS AS NEEDED
Status: DISCONTINUED | OUTPATIENT
Start: 2025-05-13 | End: 2025-05-13

## 2025-05-13 RX ORDER — CEFAZOLIN SODIUM 2 G/50ML
2000 SOLUTION INTRAVENOUS ONCE
Status: COMPLETED | OUTPATIENT
Start: 2025-05-13 | End: 2025-05-13

## 2025-05-13 RX ORDER — ONDANSETRON 2 MG/ML
4 INJECTION INTRAMUSCULAR; INTRAVENOUS EVERY 6 HOURS PRN
Status: DISCONTINUED | OUTPATIENT
Start: 2025-05-13 | End: 2025-05-13 | Stop reason: HOSPADM

## 2025-05-13 RX ORDER — EPHEDRINE SULFATE 50 MG/ML
INJECTION INTRAVENOUS AS NEEDED
Status: DISCONTINUED | OUTPATIENT
Start: 2025-05-13 | End: 2025-05-13

## 2025-05-13 RX ORDER — LIDOCAINE HYDROCHLORIDE 10 MG/ML
INJECTION, SOLUTION EPIDURAL; INFILTRATION; INTRACAUDAL; PERINEURAL AS NEEDED
Status: DISCONTINUED | OUTPATIENT
Start: 2025-05-13 | End: 2025-05-13

## 2025-05-13 RX ORDER — MAGNESIUM HYDROXIDE 1200 MG/15ML
LIQUID ORAL AS NEEDED
Status: DISCONTINUED | OUTPATIENT
Start: 2025-05-13 | End: 2025-05-13 | Stop reason: HOSPADM

## 2025-05-13 RX ORDER — ACETAMINOPHEN 325 MG/1
650 TABLET ORAL EVERY 6 HOURS PRN
Status: DISCONTINUED | OUTPATIENT
Start: 2025-05-13 | End: 2025-05-13 | Stop reason: HOSPADM

## 2025-05-13 RX ORDER — SODIUM CHLORIDE, SODIUM LACTATE, POTASSIUM CHLORIDE, CALCIUM CHLORIDE 600; 310; 30; 20 MG/100ML; MG/100ML; MG/100ML; MG/100ML
INJECTION, SOLUTION INTRAVENOUS CONTINUOUS PRN
Status: DISCONTINUED | OUTPATIENT
Start: 2025-05-13 | End: 2025-05-13

## 2025-05-13 RX ORDER — ONDANSETRON 2 MG/ML
4 INJECTION INTRAMUSCULAR; INTRAVENOUS ONCE AS NEEDED
Status: DISCONTINUED | OUTPATIENT
Start: 2025-05-13 | End: 2025-05-13 | Stop reason: HOSPADM

## 2025-05-13 RX ADMIN — EPHEDRINE SULFATE 20 MG: 50 INJECTION INTRAVENOUS at 11:52

## 2025-05-13 RX ADMIN — EPHEDRINE SULFATE 15 MG: 50 INJECTION INTRAVENOUS at 11:46

## 2025-05-13 RX ADMIN — DEXAMETHASONE SODIUM PHOSPHATE 10 MG: 10 INJECTION, SOLUTION INTRAMUSCULAR; INTRAVENOUS at 11:29

## 2025-05-13 RX ADMIN — CEFAZOLIN SODIUM 2000 MG: 2 SOLUTION INTRAVENOUS at 11:30

## 2025-05-13 RX ADMIN — SODIUM CHLORIDE, SODIUM LACTATE, POTASSIUM CHLORIDE, AND CALCIUM CHLORIDE: .6; .31; .03; .02 INJECTION, SOLUTION INTRAVENOUS at 11:21

## 2025-05-13 RX ADMIN — PROPOFOL 50 MG: 10 INJECTION, EMULSION INTRAVENOUS at 11:32

## 2025-05-13 RX ADMIN — EPHEDRINE SULFATE 10 MG: 50 INJECTION INTRAVENOUS at 11:39

## 2025-05-13 RX ADMIN — ONDANSETRON 4 MG: 2 INJECTION, SOLUTION INTRAMUSCULAR; INTRAVENOUS at 11:30

## 2025-05-13 RX ADMIN — PROPOFOL 150 MG: 10 INJECTION, EMULSION INTRAVENOUS at 11:27

## 2025-05-13 RX ADMIN — LIDOCAINE HYDROCHLORIDE 50 MG: 10 INJECTION, SOLUTION EPIDURAL; INFILTRATION; INTRACAUDAL; PERINEURAL at 11:26

## 2025-05-13 NOTE — OP NOTE
OPERATIVE REPORT  PATIENT NAME: Patrick Frey    :  1940  MRN: [id number]  Pt Location: [facility] OR ROOM 01    SURGERY DATE: 2025    Surgeons and Role:     * Radha Maher MD - Primary    Preop Diagnosis:  Carpal tunnel syndrome, bilateral [G56.03]    Post-Op Diagnosis Codes:     * Carpal tunnel syndrome, bilateral [G56.03]    Procedure(s):  Right - RELEASE CARPAL TUNNEL- Revision Right carpal tunnel release    Specimen(s):  * No specimens in log *    Estimated Blood Loss:   Minimal    Drains:  * No LDAs found *    Anesthesia Type:   IV Sedation with Anesthesia    Operative Indications:  Carpal tunnel syndrome, bilateral [G56.03]  Right addressed today  Foreign body right long finger    Operative Findings:  Compression of median nerve through carpal canal  Mild scar tissue  Foreign body from long finger- thorn. No sign of infection.      Complications:   None    Procedure and Technique:  The patient was correctly identified in the preanesthesia holding area.  The operative site was identified and marked with a marker.  We reviewed the informed consent which included the planned surgical procedure, risk, benefits, alternatives, as well as, postoperative care expectations.  Questions were answered to the patient satisfaction.  The patient voluntarily signed informed consent.    The patient was taken back to the operating room.  The patient remained on the gurney with a hand table attached to the gurney.  I applied tourniquet to the operative extremity.      I prepped the skin with alcohol and injected local into the planned surgical site.  The operative extremity was prepped and draped in a sterile fashion.  A timeout was performed.  I used a marker to plan the surgical incision for an extended carpal tunnel release.  Next, I elevated the upper extremity, applied an Esmarch, and inflated the tourniquet to 250 mmHg.  I used a #15 blade to make the skin incision.  I carefully dissected to the level of  the palmar fascia.  I repositioned the retractors to allow for direct visualization of the palmar fascia.  I incised the palmar fascia using a #15 blade.  I repositioned the retractors to allow for direct visualization of the transverse carpal ligament.  I used a #15 blade to incise the transverse carpal ligament along its ulnar border, thereby, creating a radially based flap.  The distal extent of the release was marked by the presence of fat.  I turned my attention to release of the proximal portion of the transverse carpal ligament.  I used Littler scissors to bluntly dissect to create a space for position of a Ragnell retractor.  I position the retractors to allow for direct visualization of the proximal portion of the transverse carpal ligament and the distal forearm fascia.  I used a #15 blade to incise the proximal portion of the transverse carpal ligament and the distal forearm fascia.  Next I inserted a freer elevator to evaluate the quality of the release.  No fascial bands were appreciated.  I irrigated the wound with normal saline delivered through bulb syringe.  The tourniquet  was deflated at 9 minutes.  Hemostasis was achieved using electrocautery and direct pressure.  I approximated the skin using 4-0 nylon in horizontal mattress pattern.  A small incision was made in the tip of the long finger.  Blunt dissection was performed.  A thorn was directly visualized and removed from the finger.    A sterile dressing and a volar splint were applied to the operative extremity.  The patient tolerated the procedure well and was taken to the postanesthesia care unit in stable condition.  IRadha, performed the entire procedure. A qualified resident physician was not available. and A physician assistant was required during the procedure for retraction, tissue handling, dissection and suturing.    Postoperative plan:   The patient was instructed on activity limitations, elevation, and use of ice for  pain management.    Pain management will include the use of over-the-counter Tylenol and ibuprofen.  The patient was given instructions on appropriate dosing and timing for postoperative use of Tylenol and ibuprofen.    The patient will follow-up in 7 to 10 days for removal of the splint, sutures, and to receive a home exercise program to include scar massage and nerve glide exercises.        Patient Disposition:  PACU              SIGNATURE: Radha Maher MD  DATE: May 13, 2025  TIME: 10:55 AM

## 2025-05-13 NOTE — ANESTHESIA POSTPROCEDURE EVALUATION
Post-Op Assessment Note            No anethesia notable event occurred.            Last Filed PACU Vitals:  Vitals Value Taken Time   Temp 98.2 °F (36.8 °C) 05/13/25 1240   Pulse 76 05/13/25 1252   /69 05/13/25 1250   Resp 12 05/13/25 1240   SpO2 95 % 05/13/25 1252   Vitals shown include unfiled device data.    Modified Roberto:     Vitals Value Taken Time   Activity 2 05/13/25 1250   Respiration 2 05/13/25 1250   Circulation 2 05/13/25 1250   Consciousness 2 05/13/25 1250   Oxygen Saturation 2 05/13/25 1250     Modified Roberto Score: 10

## 2025-05-13 NOTE — ANESTHESIA POSTPROCEDURE EVALUATION
Post-Op Assessment Note    CV Status:  Stable  Pain Score: 0    Pain management: adequate       Mental Status:  Alert and awake   Hydration Status:  Euvolemic   PONV Controlled:  Controlled   Airway Patency:  Patent     Post Op Vitals Reviewed: Yes    No anethesia notable event occurred.    Staff: CRNA           Last Filed PACU Vitals:  Vitals Value Taken Time   Temp 97.9 °F (36.6 °C) 05/13/25 1210   Pulse 71 05/13/25 1210   /58 05/13/25 1211   Resp 12 05/13/25 1210   SpO2 95 05/13/25 1210   Vitals shown include unfiled device data.

## 2025-05-13 NOTE — INTERVAL H&P NOTE
H&P reviewed. After examining the patient I find no changes in the patients condition since the H&P had been written.    Patient mentioned there was a thorn in the tip of his right long finger. Occurred about 10 days ago.  He requested I remove the thorn from the tip of his right long finger.  A handwritten consent was completed, signed, and scanned in the chart.    There were no vitals filed for this visit.

## 2025-05-13 NOTE — ANESTHESIA PREPROCEDURE EVALUATION
Procedure:  RELEASE CARPAL TUNNEL- Revision Right carpal tunnel release (Right: Wrist)    Relevant Problems   CARDIO   (+) Hyperlipidemia   (+) Hypertension      MUSCULOSKELETAL   (+) Generalized osteoarthritis   (+) Localized primary osteoarthritis of right elbow   (+) Primary osteoarthritis of left knee      NEURO/PSYCH   (+) Numbness and tingling in left hand                  NPO Status:  No vitals data found for the desired time range.

## 2025-05-13 NOTE — ANESTHESIA PREPROCEDURE EVALUATION
Procedure:  RELEASE CARPAL TUNNEL- Revision Right carpal tunnel release (Right: Wrist)    Relevant Problems   CARDIO   (+) Hyperlipidemia   (+) Hypertension      MUSCULOSKELETAL   (+) Generalized osteoarthritis   (+) Localized primary osteoarthritis of right elbow   (+) Primary osteoarthritis of left knee      NEURO/PSYCH   (+) Numbness and tingling in left hand        Physical Exam    Airway    Mallampati score: II  TM Distance: >3 FB  Neck ROM: full     Dental        Cardiovascular      Pulmonary      Other Findings        Anesthesia Plan  ASA Score- 2     Anesthesia Type- general with ASA Monitors.         Additional Monitors:     Airway Plan: LMA.           Plan Factors-Exercise tolerance (METS): >4 METS.    Chart reviewed.    Patient summary reviewed.    Patient is not a current smoker.              Induction- intravenous.    Postoperative Plan-         Informed Consent- Anesthetic plan and risks discussed with patient.  I personally reviewed this patient with the CRNA. Discussed and agreed on the Anesthesia Plan with the CRNA..      NPO Status:  Vitals Value Taken Time   Date of last liquid 05/12/25 05/13/25 1048   Time of last liquid 1800 05/13/25 1048   Date of last solid 05/12/25 05/13/25 1048   Time of last solid 1800 05/13/25 1048

## 2025-05-16 DIAGNOSIS — G56.03 CARPAL TUNNEL SYNDROME, BILATERAL: Primary | ICD-10-CM

## 2025-05-16 RX ORDER — NAPROXEN 500 MG/1
500 TABLET ORAL 2 TIMES DAILY WITH MEALS
Qty: 14 TABLET | Refills: 0 | Status: SHIPPED | OUTPATIENT
Start: 2025-05-16 | End: 2025-05-23

## 2025-05-20 ENCOUNTER — APPOINTMENT (OUTPATIENT)
Dept: OCCUPATIONAL THERAPY | Facility: CLINIC | Age: 85
End: 2025-05-20
Attending: ORTHOPAEDIC SURGERY
Payer: COMMERCIAL

## 2025-05-21 ENCOUNTER — EVALUATION (OUTPATIENT)
Dept: OCCUPATIONAL THERAPY | Facility: CLINIC | Age: 85
End: 2025-05-21
Attending: ORTHOPAEDIC SURGERY
Payer: COMMERCIAL

## 2025-05-21 DIAGNOSIS — G56.03 CARPAL TUNNEL SYNDROME, BILATERAL: Primary | ICD-10-CM

## 2025-05-21 PROCEDURE — 97165 OT EVAL LOW COMPLEX 30 MIN: CPT

## 2025-05-21 NOTE — PROGRESS NOTES
OT Evaluation     Today's date: 2025  Patient name: Patrick Frey  : 1940  MRN: [id number]  Referring provider: Radha Maher,*  Dx:   Encounter Diagnosis     ICD-10-CM    1. Carpal tunnel syndrome, bilateral  G56.03           Start Time: 1100  Stop Time: 1120  Total time in clinic (min): 20 minutes    Assessment  Impairments: abnormal or restricted ROM, activity intolerance, lacks appropriate home exercise program, pain with function and weight-bearing intolerance    Assessment details: Patrick Frey is a 84 y.o. male who presents s/p R CTR. Patient tolerated session well. Patrick reported difficulty with activities of daily living secondary to decreased range of motion. Provided home exercise program for range of motion and median nerve glides. Patient was able to demonstrate home program past instruction with use of handouts. Patient advised to contact doctor if there is a change of status or if any signs/symptoms of infection occur. Patient is a good candidate to benefit from skilled occupational therapy to address impairments and return to maximal level of function with minimal symptoms.   Understanding of Dx/Px/POC: good     Prognosis: good    Goals  Short term goals:  Patient to demonstrate understanding of home exercise program in 2 weeks for decreased pain with activities of daily living  Patient to demonstrate increased active range of motion of wrist to 60 degrees extension in 4 weeks to aid in showering  Patient to increase composite digital flexion to touch distal [place] crease in 4 weeks to aid in holding utensils  Patient to demonstrate appropriate wound closure in 10 - 14 days post op as evidenced by lack of infection to enhance range of motion with grooming  Patient to report a decrease in pain by at least 1 point on a 0-10 scale in 2 weeks to aid in dressing    Long term goals:  Patient to demonstrate functional active range of motion for independent ADL's by time of  "discharge  Patient to demonstrate functional strength for independent ADL's by time of discharge  Patient to demonstrate understanding of final discharge home exercise program by time of discharge    Plan  Patient would benefit from: OT eval and skilled occupational therapy  Planned modality interventions: ultrasound, thermotherapy: hydrocollator packs and cryotherapy    Planned therapy interventions: manual therapy, massage, strengthening, stretching, therapeutic activities, therapeutic exercise, fine motor coordination training, functional ROM exercises, home exercise program, activity modification, neuromuscular re-education and patient education    Frequency: Every other week  Plan of Care beginning date: 2025  Plan of Care expiration date: 2025  Treatment plan discussed with: patient        Subjective Evaluation    History of Present Illness  Mechanism of injury: Patient underwent R CTR on 25. He reported that he had his left surgery performed two weeks prior, but that he is having more complications with the right one. Patient reported that his 3rd and 4th digits have felt stiff, cold, and numb since the surgery. He noted that he gets a feeling like \"a hot poker stick\" running along his long finger. Patient presented to therapy this date for suture removal and home exercise program.   Patient Goals  Patient goals for therapy: decreased edema and decreased pain    Pain  At best pain ratin  At worst pain ratin  Quality: burning, dull ache and radiating    Hand dominance: right          Objective     Neurological Testing     Sensation     Wrist/Hand     Right   Paresthesia: light touch    Additional Neurological Details  Patient reported paresthesia and hypersensitivity in right 3rd and 4th digits    Active Range of Motion     Left Wrist   Wrist flexion: 60 degrees   Wrist extension: 35 degrees              Precautions: s/p R CTR 25  CO-MORBIDITIES:   HEP ACCESS CODE:   FOTO Completed " On:     POC Expires Reeval for Medicare to be completed  Auth Expiration Date PT/OT/STVisit Limit   6/18/25                        Auth Status DATE 5/21         Visit # 1         Remaining         MANUAL THERAPY                                                               THERAPEUTIC EXERCISE HEP         Tendon glides x x10        AROM wrist x x10                                                                                        NEUROMUSCULAR REEDUCATION           Median nerve glides x x10                                                THERAPEUTIC ACTIVITY                                                                                                    MODALITIES

## 2025-05-23 ENCOUNTER — OFFICE VISIT (OUTPATIENT)
Dept: OBGYN CLINIC | Facility: CLINIC | Age: 85
End: 2025-05-23

## 2025-05-23 VITALS — BODY MASS INDEX: 21.9 KG/M2 | HEIGHT: 68 IN

## 2025-05-23 DIAGNOSIS — M65.341 TRIGGER FINGER, RIGHT RING FINGER: Primary | ICD-10-CM

## 2025-05-23 DIAGNOSIS — Z98.890 S/P BILATERAL CARPAL TUNNEL RELEASE: ICD-10-CM

## 2025-05-23 RX ORDER — BETAMETHASONE SODIUM PHOSPHATE AND BETAMETHASONE ACETATE 3; 3 MG/ML; MG/ML
6 INJECTION, SUSPENSION INTRA-ARTICULAR; INTRALESIONAL; INTRAMUSCULAR; SOFT TISSUE
Status: COMPLETED | OUTPATIENT
Start: 2025-05-23 | End: 2025-05-23

## 2025-05-23 RX ORDER — LIDOCAINE HYDROCHLORIDE 10 MG/ML
1 INJECTION, SOLUTION INFILTRATION; PERINEURAL
Status: COMPLETED | OUTPATIENT
Start: 2025-05-23 | End: 2025-05-23

## 2025-05-23 RX ADMIN — LIDOCAINE HYDROCHLORIDE 1 ML: 10 INJECTION, SOLUTION INFILTRATION; PERINEURAL at 13:15

## 2025-05-23 RX ADMIN — BETAMETHASONE SODIUM PHOSPHATE AND BETAMETHASONE ACETATE 6 MG: 3; 3 INJECTION, SUSPENSION INTRA-ARTICULAR; INTRALESIONAL; INTRAMUSCULAR; SOFT TISSUE at 13:15

## 2025-05-23 NOTE — PROGRESS NOTES
ORTHOPAEDIC HAND, WRIST, AND ELBOW OFFICE  VISIT     Name: Patrick Frey      : 1940      MRN: [id number]  Encounter Provider: Radha Maher MD  Encounter Date: 2025   Encounter department: [facility]  :  Assessment & Plan  Trigger finger, right ring finger  S/p bilateral carpal tunnel release  Subjective history, clinical exam, and diagnostic studies reviewed with patient  Diagnosis discussed  S/p CTR.  Patient having symptoms consistent with recovery of median nerve function for severe CTS on the right.  Left has recovery well.  Also has R RF trigger finger  Treatment options for the trigger finger were discussed which include nonoperative management.  We discussed use of use of NSAIDs (oral and topical), and injections.  We discussed risks and benefits of each as well as expected reasonable outcomes.    I recommended CSI for the R RF TF.  Symptoms may recur following CSI, especially in patients who present with longstanding symptoms and patient who have diabetes.  May have repeat CSI after 8 weeks.  If symptoms recur after 2 CSI's, patient will be offered surgery in the form of trigger finger release or the option of a 3rd CSI.  Patient with diabetes are counseled to monitor the blood sugars following the injection.    The patient was given the opportunity to ask questions.  Questions were answered to the patient's satisfaction.  The patient decided to move forward with CSI for R RF TF via shared decision making.  Discussed with patient the right side carpal tunnel was worse than the left. I explained this will take time for the nerve to regenerate and as it regenerates there could be some significant symptoms including burning sensation and pain in the hand.  He may use the splint at night.  His wife explained that he sleeps with both wrists flexed.  A splint will keep wrists in a neutral position  Follow up for  appointment. If asymptomatic,  "may cancel appointment       Orders:    Hand/upper extremity injection: R ring A1            History of Present Illness   HPI  No chief complaint on file.      SUBJECTIVE:  Patrick Frey is a 84 y.o. male who presents for follow up after RELEASE CARPAL TUNNEL- Revision Right carpal tunnel release, REMOVAL OF FOREIGN BODY RIGHT LONG FINGER - Right on 5/13/2025.  Today patient has continued numbness and pain in the right wrist. He reports getting a burning pain in the palm around the long and ring fingers. He is getting some locking of the ring finger on the right hand.        PHYSICAL EXAMINATION:  Vital signs: Ht 5' 8\" (1.727 m)   BMI 21.90 kg/m²   General: well developed and well nourished, alert, oriented times 3, and appears comfortable  Psychiatric: Normal    MUSCULOSKELETAL EXAMINATION:  Incision: healed  Range of Motion: As expected. R RF observed locking in flexed position. Able to actively extend.  Neurovascular status: Neuro intact, good cap refill  Activity Restrictions: No restrictions       STUDIES REVIEWED:  No Studies to review      PROCEDURES PERFORMED:  Hand/upper extremity injection: R ring A1    Universal Protocol:  Consent: Verbal consent obtained  Risks and benefits: risks, benefits and alternatives were discussed  Consent given by: patient  Patient understanding: patient states understanding of the procedure being performed  Patient identity confirmed: verbally with patient  Supporting Documentation  Indications: pain   Procedure Details  Condition:trigger finger Location: ring finger - R ring A1   Needle size: 25 G  Ultrasound guidance: no  Approach: volar  Medications administered: 1 mL lidocaine 1 %; 6 mg betamethasone acetate-betamethasone sodium phosphate 6 (3-3) mg/mL  Patient tolerance: patient tolerated the procedure well with no immediate complications  Dressing:  Sterile dressing applied         -    Scribe Attestation      I,:  Abisai Dorsey am acting as a scribe while in the " presence of the attending physician.:       I,:  Radha Maher MD personally performed the services described in this documentation    as scribed in my presence.:

## 2025-06-02 ENCOUNTER — TELEPHONE (OUTPATIENT)
Age: 85
End: 2025-06-02

## 2025-06-02 DIAGNOSIS — R13.10 DYSPHAGIA, UNSPECIFIED TYPE: Primary | ICD-10-CM

## 2025-06-02 NOTE — TELEPHONE ENCOUNTER
Pt wanted an ENT referral for his throat issues, having a hard time swallowing not all the time.  Every now and then when eating food gets hung up in back of his throat.      I did make appt for 6/4 since he said he's not been seen for this issue but he really didn't want appt.  He just wanted the referral because he knows that's what Dr Marte will end up doing.

## 2025-06-02 NOTE — TELEPHONE ENCOUNTER
Referral placed, spoke with pt to let him know. I advised he could keep his appt with Dr. Marte as well as I am not sure how far out ENT is scheduling. He is comfortable waiting for the appt with ENT and Wednesdays appt can be canceled.

## 2025-06-11 ENCOUNTER — OFFICE VISIT (OUTPATIENT)
Dept: OBGYN CLINIC | Facility: CLINIC | Age: 85
End: 2025-06-11

## 2025-06-11 VITALS — HEIGHT: 68 IN | BODY MASS INDEX: 21.9 KG/M2

## 2025-06-11 DIAGNOSIS — Z98.890 S/P BILATERAL CARPAL TUNNEL RELEASE: Primary | ICD-10-CM

## 2025-06-11 PROCEDURE — 99024 POSTOP FOLLOW-UP VISIT: CPT | Performed by: ORTHOPAEDIC SURGERY

## 2025-06-11 NOTE — PROGRESS NOTES
"    ORTHOPAEDIC HAND, WRIST, AND ELBOW OFFICE  VISIT     Name: Patrick Frey      : 1940      MRN: [id number]  Encounter Provider: Radha Maher MD  Encounter Date: 2025   Encounter department: [facility]  :  Assessment & Plan  S/p bilateral carpal tunnel release    Orders:    Brace    S/p CTR.  Patient having symptoms consistent with recovery of median nerve function for severe CTS on the right.  Left has recovered well.  Also has R RF trigger finger  Carpal tunnel silicone sleeve for the right hand was given today to wear when doing activity   Follow up as needed          History of Present Illness   HPI  Chief Complaint   Patient presents with    Right Hand - Post-op     Right middle and ring fing triggering, fingers feel cold    Left Hand - Post-op       SUBJECTIVE:  Patrick Frey is a 84 y.o. male who presents for follow up after RELEASE CARPAL TUNNEL- Revision Right carpal tunnel release, REMOVAL OF FOREIGN BODY RIGHT LONG FINGER - Right on 2025.  Today patient has Pain  Mild  Intermittant  Sharp and Electric and Numbness located around the long and ring fingers. Overall feels the pain/numbness/tingling in the right hand are improving.  Has been wearing splint at night since he sleeps with wrist flexed and hand clenched in fist.  Splint helps.      PHYSICAL EXAMINATION:  Vital signs: Ht 5' 8\" (1.727 m)   BMI 21.90 kg/m²   General: well developed and well nourished, alert, oriented times 3, and appears comfortable  Psychiatric: Normal    MUSCULOSKELETAL EXAMINATION:  Incision: Healed.  Right side with thick scar and mild scar sensitivity  Range of Motion: As expected  Neurovascular status: Sensation improved in median nerve distribution, yet remains diminished at long and ring finger tips of the right hand  Activity Restrictions: No restrictions      STUDIES REVIEWED:  No Studies to review      PROCEDURES PERFORMED:  Procedures  No " Procedures performed today     Scribe Attestation      I,:  Jeanette Miles am acting as a scribe while in the presence of the attending physician.:       I,:  Radha Maher MD personally performed the services described in this documentation    as scribed in my presence.:

## 2025-06-11 NOTE — ASSESSMENT & PLAN NOTE
Orders:    Brace    S/p CTR.  Patient having symptoms consistent with recovery of median nerve function for severe CTS on the right.  Left has recovered well.  Also has R RF trigger finger  Carpal tunnel silicone sleeve for the right hand was given today to wear when doing activity   Follow up as needed

## 2025-06-17 ENCOUNTER — OFFICE VISIT (OUTPATIENT)
Dept: URGENT CARE | Facility: CLINIC | Age: 85
End: 2025-06-17
Payer: COMMERCIAL

## 2025-06-17 VITALS
TEMPERATURE: 97 F | RESPIRATION RATE: 18 BRPM | DIASTOLIC BLOOD PRESSURE: 84 MMHG | SYSTOLIC BLOOD PRESSURE: 158 MMHG | HEART RATE: 94 BPM | OXYGEN SATURATION: 95 %

## 2025-06-17 DIAGNOSIS — M62.830 MUSCLE SPASM OF BACK: Primary | ICD-10-CM

## 2025-06-17 PROCEDURE — S9083 URGENT CARE CENTER GLOBAL: HCPCS | Performed by: PHYSICIAN ASSISTANT

## 2025-06-17 PROCEDURE — G0382 LEV 3 HOSP TYPE B ED VISIT: HCPCS | Performed by: PHYSICIAN ASSISTANT

## 2025-06-17 RX ORDER — METHOCARBAMOL 500 MG/1
500 TABLET, FILM COATED ORAL 4 TIMES DAILY
Qty: 28 TABLET | Refills: 0 | Status: SHIPPED | OUTPATIENT
Start: 2025-06-17 | End: 2025-06-24

## 2025-06-17 NOTE — PROGRESS NOTES
[facility]        NAME: Patrick Frey is a 84 y.o. male  : 1940    MRN: [id number]  DATE: 2025  TIME: 9:42 AM    Assessment and Plan   Muscle spasm of back [M62.830]  1. Muscle spasm of back  methocarbamol (ROBAXIN) 500 mg tablet            Patient Instructions       Follow up with PCP in 3-5 days.  Proceed to  ER if symptoms worsen.    If tests have been performed at [facility], our office will contact you with results if changes need to be made to the care plan discussed with you at the visit.  You can review your full results on [facility].    Chief Complaint     Chief Complaint   Patient presents with    Back Pain     Patient with back pain from doing outside work since Saturday. Patient would like a muscle relaxer. Patient states the pain is mostly on his L side, from his shoulder blade down his back.          History of Present Illness       Patient states he threw his back out removing  from the back of his truck.  Its the same spot he usually does this too over the left mid back.  Usually takes a muscle relaxer and it fixes this but doesn't have any. It feels the exact same as when he's injured it before.   Denies chest pains, SOB, dyspnea, numbness/tingling, weakness, incontinence, fevers, palpitations, bruising, swelling.     Back Pain  Pertinent negatives include no chest pain, fever, numbness or weakness.       Review of Systems   Review of Systems   Constitutional:  Negative for fatigue and fever.   Respiratory:  Negative for shortness of breath.    Cardiovascular:  Negative for chest pain and palpitations.   Genitourinary:  Negative for difficulty urinating.   Musculoskeletal:  Positive for back pain.   Neurological:  Negative for weakness and numbness.         Current Medications     Current Medications[1]    Current Allergies     Allergies as of 2025 - Reviewed 2025   Allergen Reaction Noted    Azithromycin Other (See Comments) 2016     Meperidine Other (See Comments) 01/21/2013    Pseudoephedrine Other (See Comments) 01/21/2013            The following portions of the patient's history were reviewed and updated as appropriate: allergies, current medications, past family history, past medical history, past social history, past surgical history and problem list.     Past Medical History[2]    Past Surgical History[3]    Family History[4]      Medications have been verified.        Objective   /84   Pulse 94   Temp (!) 97 °F (36.1 °C)   Resp 18   SpO2 95%   No LMP for male patient.       Physical Exam     Physical Exam  Constitutional:       General: He is not in acute distress.     Appearance: Normal appearance. He is not ill-appearing.     Cardiovascular:      Rate and Rhythm: Normal rate and regular rhythm.      Heart sounds: Normal heart sounds.   Pulmonary:      Effort: Pulmonary effort is normal.      Breath sounds: Normal breath sounds.     Musculoskeletal:         General: No swelling or tenderness. Normal range of motion.        Back:       Comments: Full AROM with 5/5 muscle strength of the lower extremities and back. Pain over the left thoracolumbar area with AROM of the back.  No TTP along the spine, shelving, ecchymosis, or deformity noted.      Skin:     Findings: No bruising.     Neurological:      Mental Status: He is alert.     Psychiatric:         Mood and Affect: Mood normal.         Behavior: Behavior normal.                        [1]   Current Outpatient Medications:     methocarbamol (ROBAXIN) 500 mg tablet, Take 1 tablet (500 mg total) by mouth 4 (four) times a day for 7 days, Disp: 28 tablet, Rfl: 0    ezetimibe (ZETIA) 10 mg tablet, Take 1 tablet (10 mg total) by mouth daily, Disp: 90 tablet, Rfl: 1    lisinopril (ZESTRIL) 20 mg tablet, Take 1 tablet (20 mg total) by mouth daily, Disp: 90 tablet, Rfl: 3    Multiple Vitamins-Minerals (ZINC PO), Take by mouth every other day, Disp: , Rfl:     multivitamin  (THERAGRAN) TABS, Take 1 tablet by mouth in the morning., Disp: , Rfl:     naproxen (Naprosyn) 500 mg tablet, Take 1 tablet (500 mg total) by mouth 2 (two) times a day with meals for 7 days, Disp: 14 tablet, Rfl: 0    Naproxen Sodium (Aleve) 220 MG CAPS, Take by mouth if needed, Disp: , Rfl:     rosuvastatin (CRESTOR) 5 mg tablet, Take 1 tablet (5 mg total) by mouth daily, Disp: 90 tablet, Rfl: 1  [2]   Past Medical History:  Diagnosis Date    Arthritis     Chronic sinusitis     Last Assessed:3/26/14    Colon polyp     Degeneration of cervical intervertebral disc     Last Assessed:3/26/14    Derangement of unspecified medial meniscus due to old tear or injury, left knee     Last Assessed:4/10/14    Erythema migrans (Lyme disease)     Last Assessed:5/22/13    Eustachian tube dysfunction     Last Assessed:9/29/15    HL (hearing loss)     Hyperlipidemia     Hypertension     Low back pain     Lumbosacral disc disease     Osteoarthritis of wrist     Last Assessed:5/13/15    Primary osteoarthritis of left knee     Last Assessed:7/16/14    Spondylosis of cervical region without myelopathy or radiculopathy     Last Assessed:3/31/14    TMJ syndrome     Last Assessed:12/30/14    Trigger finger of left hand     Last Assessed:5/13/15    Trigger finger of right hand     Little finger, middle finger   [3]   Past Surgical History:  Procedure Laterality Date    CATARACT EXTRACTION      COLONOSCOPY      KNEE SURGERY Left     LA NEUROPLASTY &/TRANSPOS MEDIAN NRV CARPAL TUNNE Left 04/25/2019    Procedure: RELEASE CARPAL TUNNEL OPEN;  Surgeon: Carson Poole MD;  Location:  MAIN OR;  Service: Orthopedics    LA NEUROPLASTY &/TRANSPOS MEDIAN NRV CARPAL TUNNE Left 5/2/2025    Procedure: RELEASE CARPAL TUNNEL - Revision Left carpal tunnel release;  Surgeon: Radha Maher MD;  Location:  MAIN OR;  Service: Orthopedics    LA NEUROPLASTY &/TRANSPOS MEDIAN NRV CARPAL TUNNE Right 5/13/2025    Procedure: RELEASE CARPAL TUNNEL-  Revision Right carpal tunnel release, REMOVAL OF FOREIGN BODY RIGHT LONG FINGER;  Surgeon: Radha Maher MD;  Location: [facility];  Service: Orthopedics    MS TENDON SHEATH INCISION Left 04/25/2019    Procedure: RELEASE TRIGGER FINGER - LEFT SMALL;  Surgeon: Carson Poole MD;  Location: [facility];  Service: Orthopedics    SHOULDER ARTHROSCOPY Bilateral     TONSILLECTOMY     [4]   Family History  Problem Relation Name Age of Onset    Heart disease Mother Dee Dee Tk         Coronary heart disease    Dementia Mother Dee Dee Tk     Heart disease Father Breezy Frey         Coronary heart disease    Lung cancer Paternal Grandmother

## 2025-06-18 ENCOUNTER — HOSPITAL ENCOUNTER (OUTPATIENT)
Dept: RADIOLOGY | Facility: HOSPITAL | Age: 85
Discharge: HOME | End: 2025-06-18
Attending: STUDENT IN AN ORGANIZED HEALTH CARE EDUCATION/TRAINING PROGRAM
Payer: COMMERCIAL

## 2025-06-18 DIAGNOSIS — R13.10 DYSPHAGIA, UNSPECIFIED TYPE: ICD-10-CM

## 2025-06-18 PROCEDURE — 74230 X-RAY XM SWLNG FUNCJ C+: CPT

## 2025-06-18 PROCEDURE — 92611 MOTION FLUOROSCOPY/SWALLOW: CPT

## 2025-06-18 NOTE — PROCEDURES
Video Swallow Study      Patient Name: Patrick Frey  Today's Date: 6/18/2025        Past Medical History  Past Medical History[1]     Past Surgical History  Past Surgical History[2]      Modified (Video) Barium Swallow Study    Summary:  Images are on PACS for review.     Pt presents w/ functional oral swallow skills, min-mild pharyngeal dysphagia.     Adequate bolus control w/ functional bolus transfer. Adequate BOT retraction w/ trace residue noted. Fairly prompt swallow initiation w/ bolus head near the ramus of the mandible. Reduced laryngeal elevation, vestibule closure, and incomplete epiglottic inversion w/ tip butting against posterior pharyngeal wall at times. Adequate anterior hyoid excursion. Penetration w/ thin liquids, see below for further details. No aspiration visualized on today's study. Present though reduced pharyngeal stripping wave. Reduced UES opening, ?bony outgrowths/osteophytic spurring near C4/5, ?CP prominence and ?beginning of Zenker's diverticulum resulting in retropulsion of contents back through UES and into pharynx at times. Overall, min residue noted in the valleculae and pyriforms. Min-mild esophageal retention near LES.     VBS findings and recommendations immediately reviewed w/ pt w/ use of images to aid in understanding. Education provided on strategies to optimize swallow safety, including the importance of oral care and s/s aspiration to monitor for and notify medical team of should they arise. Pt verbalized understanding and denied questions at this time.      Recommendations:  Diet: Regular textures   Liquids: Thin   Meds: whole, one at a time, single sip thin liquids   Strategies: slow rate, small bites/sips, alternate solids/liquids, single sips   Frequent oral care  Upright position  F/u ST tx: -   Aspiration Precautions  Reflux Precautions  Consider consult with: GI given esophageal retention, ?CP prominence, ?beginning of Zenker's  "diverticulum   Results reviewed with: pt, physician  Repeat MBS as necessary  If a dedicated assessment of the esophagus is desired, consider esophagram/barium swallow or EGD.      H&P/pertinent provider notes: (PMH noted above)  Vic Frey is an 84 y.o. male w/ a PMHx significant for but not limited to allergic rhinitis, HTN, HLD, TMJ, presenting to [facility] for completion of OP [facility]SS. Pt was referred by MD Ben Shaikh 2/2 pt reporting \"dysphagia, primarily with pills. Occasionally, he experiences choking while eating food, but he does not choke with liquids.\"  Upon arrival pt reports feeling globus sensation and \"choking\" at times. He state it \"isn't all the times, it could have happened 6 months ago or it could happen tomorrow.\"     Special Studies:  6/6/25   Procedures  Flexible laryngoscopy performed:  The nasal cavities were decongested with lidocaine and oxymetazoline spray.   Fiberoptic scope advanced through left nare, findings:  Nasal cavity: Septum deviated, no polyps or mucopus.  Nasopharynx: unremarkable, no masses or lesions, eustachian tube orifi and Fossae of Rosenmuller unremakable.  Oropharynx: mucosa moist, no masses or lesions, tongue base, lateral and posterior walls normal  Larynx: True vocal folds mobile, no masses or lesions, widely patent glottic chink,   Hypopharynx: Pyriform sinuses clear without masses or pooling.  Post-cricoid area unremarkable.   The patient tolerated the procedure well.    Previous VBS:  N/A     Does the pt have pain? No  If yes, was nursing made aware/was it addressed? N/A     Swallow Mechanism Exam  Facial: symmetrical  Labial: WFL  Lingual: WFL  Velum: symmetrical  Mandible: adequate ROM  Dentition: adequate  Vocal quality:clear/adequate   Volitional Cough: strong/productive   Respiratory Status: on RA     Swallow Information   Current Diet/Baseline Diet: regular diet and thin liquids per pt report       Consistencies Administered:  Pt was viewed sitting upright in " the lateral and AP positions. Trials administered were comparable to MBSImP Validated Protocol: tsp thin liquid x2, cup sip thin, sequential swallow cup sip thin, tsp nectar thick, cup sip nectar thick, sequential swallow cup sip nectar thick, tsp Honey thick, tsp pudding, ½ cookie coated with pudding coating, tsp nectar thick in the AP position, and tsp pudding in the AP position. Pt was also given thin liquids by straw, as well as a barium tablet with thin liquid.       Oral Phase:  Lip Closure: Complete   Tongue Control During Bolus Hold: WFL  Bolus Preparation/Mastication: Timely   Bolus Transport/Lingual Motion: Adequate   Oral Residue: trace on BOT   Initiation of the Pharyngeal Swallow: fairly prompt w/ bolus head near the ramus of the mandible     Pharyngeal Phase:  Soft Palate Elevation: WFL  Laryngeal Elevation: Reduced   Anterior Hyoid Excursion: Adequate   Epiglottic Movement: incomplete w/ epiglottic tip butting against posterior pharyngeal wall at times   Laryngeal Vestibular Closure: Reduced   Pharyngeal Stripping Wave: present though reduced   PES Opening: reduced, ?bony outgrowths near C4/5 ?similarity to osteophytic spurring, ?CP prominence and possible beginning of Zenker's diverticulum   Tongue Base Retraction: Adequate   Pharyngeal Residue: min residue in the valleculae and pyriforms at times- noted retropulsion of contents through UES into pharynx at times       Penetration/Aspiration:  Thin: tsp/cup- no penetration/aspiration (PAS-1)  Sequential/straw- penetration during the swallow w/ epiglottic undercoating (PAS-3)  Barium tablet- deep penetration during the swallow w/ epiglottic undercoating (PAS-3)  Nectar: tsp/cup- no penetration/aspiration (PAS-1)  Sequential- penetration during the swallow w/ epiglottic undercoating (PAS-3)  Honey: no penetration/aspiration (PAS-1)  Puree: no penetration/aspiration (PAS-1)  Solid: no penetration/aspiration (PAS-1)  Response to Aspiration: N/A    Strategies/Efficacy: N/A     8-Point Penetration-Aspiration Scale   1 Material does not enter the airway   2 Material enters the airway, remains above the vocal folds, and is ejected  from the  airway    3 Material enters the airway, remains above the vocal folds, and is not ejected from the airway   4 Material enters the airway, contacts the vocal folds, and is ejected from the airway   5 Material enters the airway, contacts the vocal folds, and is not ejected from the airway    6 Material enters the airway, passes below the vocal folds and is ejected into the larynx or out of the airway    7 Material enters the airway, passes below the vocal folds, and is not ejected from the trachea despite effort    8 Material enters the airway, passes below the vocal folds, and no effort is made to eject         Screening of Esophageal Phase  Esophageal Clearance: min-mild esophageal retention near LES              [1]   Past Medical History:  Diagnosis Date    Arthritis     Chronic sinusitis     Last Assessed:3/26/14    Colon polyp     Degeneration of cervical intervertebral disc     Last Assessed:3/26/14    Derangement of unspecified medial meniscus due to old tear or injury, left knee     Last Assessed:4/10/14    Erythema migrans (Lyme disease)     Last Assessed:5/22/13    Eustachian tube dysfunction     Last Assessed:9/29/15    HL (hearing loss)     Hyperlipidemia     Hypertension     Low back pain     Lumbosacral disc disease     Osteoarthritis of wrist     Last Assessed:5/13/15    Primary osteoarthritis of left knee     Last Assessed:7/16/14    Spondylosis of cervical region without myelopathy or radiculopathy     Last Assessed:3/31/14    TMJ syndrome     Last Assessed:12/30/14    Trigger finger of left hand     Last Assessed:5/13/15    Trigger finger of right hand     Little finger, middle finger   [2]   Past Surgical History:  Procedure Laterality Date    CATARACT EXTRACTION      COLONOSCOPY      KNEE SURGERY Left      OH NEUROPLASTY &/TRANSPOS MEDIAN NRV CARPAL TUNNE Left 04/25/2019    Procedure: RELEASE CARPAL TUNNEL OPEN;  Surgeon: Carson Poole MD;  Location: [facility];  Service: Orthopedics    OH NEUROPLASTY &/TRANSPOS MEDIAN NRV CARPAL TUNNE Left 5/2/2025    Procedure: RELEASE CARPAL TUNNEL - Revision Left carpal tunnel release;  Surgeon: Radha Maher MD;  Location: [facility];  Service: Orthopedics    OH NEUROPLASTY &/TRANSPOS MEDIAN NRV CARPAL TUNNE Right 5/13/2025    Procedure: RELEASE CARPAL TUNNEL- Revision Right carpal tunnel release, REMOVAL OF FOREIGN BODY RIGHT LONG FINGER;  Surgeon: Radha Maher MD;  Location: [facility];  Service: Orthopedics    OH TENDON SHEATH INCISION Left 04/25/2019    Procedure: RELEASE TRIGGER FINGER - LEFT SMALL;  Surgeon: Carson Poole MD;  Location: [facility];  Service: Orthopedics    SHOULDER ARTHROSCOPY Bilateral     TONSILLECTOMY

## 2025-06-19 ENCOUNTER — OFFICE VISIT (OUTPATIENT)
Dept: FAMILY MEDICINE CLINIC | Facility: CLINIC | Age: 85
End: 2025-06-19
Payer: COMMERCIAL

## 2025-06-19 ENCOUNTER — TELEPHONE (OUTPATIENT)
Age: 85
End: 2025-06-19

## 2025-06-19 VITALS
OXYGEN SATURATION: 96 % | SYSTOLIC BLOOD PRESSURE: 136 MMHG | HEART RATE: 65 BPM | DIASTOLIC BLOOD PRESSURE: 74 MMHG | HEIGHT: 68 IN | WEIGHT: 149.6 LBS | TEMPERATURE: 100.3 F | BODY MASS INDEX: 22.67 KG/M2 | RESPIRATION RATE: 22 BRPM

## 2025-06-19 DIAGNOSIS — M54.50 ACUTE LEFT-SIDED LOW BACK PAIN WITHOUT SCIATICA: ICD-10-CM

## 2025-06-19 DIAGNOSIS — M25.512 ACUTE PAIN OF LEFT SHOULDER: Primary | ICD-10-CM

## 2025-06-19 DIAGNOSIS — R50.9 ELEVATED TEMPERATURE: ICD-10-CM

## 2025-06-19 PROCEDURE — 96372 THER/PROPH/DIAG INJ SC/IM: CPT | Performed by: STUDENT IN AN ORGANIZED HEALTH CARE EDUCATION/TRAINING PROGRAM

## 2025-06-19 PROCEDURE — 99214 OFFICE O/P EST MOD 30 MIN: CPT | Performed by: STUDENT IN AN ORGANIZED HEALTH CARE EDUCATION/TRAINING PROGRAM

## 2025-06-19 RX ORDER — TRAMADOL HYDROCHLORIDE 50 MG/1
50 TABLET ORAL EVERY 8 HOURS PRN
Qty: 6 TABLET | Refills: 0 | Status: SHIPPED | OUTPATIENT
Start: 2025-06-19 | End: 2025-06-21

## 2025-06-19 RX ORDER — KETOROLAC TROMETHAMINE 30 MG/ML
30 INJECTION, SOLUTION INTRAMUSCULAR; INTRAVENOUS ONCE
Status: COMPLETED | OUTPATIENT
Start: 2025-06-19 | End: 2025-06-19

## 2025-06-19 RX ORDER — CYCLOBENZAPRINE HCL 5 MG
5 TABLET ORAL 3 TIMES DAILY PRN
Qty: 21 TABLET | Refills: 0 | Status: SHIPPED | OUTPATIENT
Start: 2025-06-19

## 2025-06-19 RX ADMIN — KETOROLAC TROMETHAMINE 30 MG: 30 INJECTION, SOLUTION INTRAMUSCULAR; INTRAVENOUS at 12:14

## 2025-06-19 NOTE — PATIENT INSTRUCTIONS
First line: topical voltaren gel/diclofenac gel and lidocaine patch/lidoderm patch over the counter  Second line: Tylenol 500 mg every 6 hours and ibuprofen 200 to 400 mg twice daily over the counter  Third line: flexeril    Fourth line: tramadol

## 2025-06-19 NOTE — PROGRESS NOTES
Name: Patrick Frey      : 1940      MRN: [id number]  Encounter Provider: Ml Jade MD  Encounter Date: 2025   Encounter department: [facility]  :  Assessment & Plan  Acute pain of left shoulder  Sx c/w trapezius muscle spasm - palpable tenderness over muscle; discussed possible lower cervical/upper thoracic DDD contributing as well but MSK tenderness elicited directly over muscle   Orders:    Ambulatory Referral to Physical Therapy; Future    cyclobenzaprine (FLEXERIL) 5 mg tablet; Take 1 tablet (5 mg total) by mouth 3 (three) times a day as needed for muscle spasms    ketorolac (TORADOL) injection 30 mg    traMADol (Ultram) 50 mg tablet; Take 1 tablet (50 mg total) by mouth every 8 (eight) hours as needed for severe pain    Acute left-sided low back pain without sciatica   Lumbar paraspinal tenderness elicited on exam   No bowel or bladder incontinence or retention; no saddle paresthesias;  no constitutional sx  Normal motor and neurologic testing       Discussed with pt conservative treatment including PT and topical/oral pain relievers. Pain regimen discussed     First line: topical voltaren gel/diclofenac gel and lidocaine patch/lidoderm patch over the counter  Second line: Tylenol 500 mg every 6 hours and ibuprofen 200 to 400 mg twice daily over the counter  Third line: flexeril   AE of tx discussed   Fourth line: tramadol AE of tx discussed - 2 day supply only     Continue with PT/physical therapy  services; heat/ice application as needed. If pain acute worsens, return to care or go to ED. If pain not improving with PT trial in 4-6 weeks, return to care       Orders:    Ambulatory Referral to Physical Therapy; Future    cyclobenzaprine (FLEXERIL) 5 mg tablet; Take 1 tablet (5 mg total) by mouth 3 (three) times a day as needed for muscle spasms    ketorolac (TORADOL) injection 30 mg    traMADol (Ultram) 50 mg tablet; Take 1 tablet (50 mg total) by mouth every 8  "(eight) hours as needed for severe pain    Elevated temperature  Temp of 100.3F; pt denies any infectious ROS  Return precautions discussed               History of Present Illness   83 yo M with HTN, HLD presenting for MSK concerns. Notes he was moving a heavy refrigerator and had acute onset left shoulder and back pain. Notes present for one week and not getting better with conservative tx at home    Back Pain  Pertinent negatives include no abdominal pain, chest pain, dysuria, fever or headaches.     Review of Systems   Constitutional:  Negative for chills and fever.   HENT:  Negative for ear pain, postnasal drip, rhinorrhea, sinus pressure, sinus pain, sore throat and trouble swallowing.    Eyes:  Negative for pain and visual disturbance.   Respiratory:  Negative for cough, shortness of breath, wheezing and stridor.    Cardiovascular:  Negative for chest pain, palpitations and leg swelling.   Gastrointestinal:  Negative for abdominal pain, diarrhea, nausea and vomiting.   Genitourinary:  Negative for dysuria, flank pain and hematuria.   Musculoskeletal:  Positive for arthralgias and back pain.   Skin:  Negative for color change and rash.   Neurological:  Negative for syncope, light-headedness and headaches.   All other systems reviewed and are negative.      Objective   /74 (BP Location: Left arm, Patient Position: Sitting, Cuff Size: Standard)   Pulse 65   Temp 100.3 °F (37.9 °C) (Tympanic)   Resp 22   Ht 5' 8\" (1.727 m)   Wt 67.9 kg (149 lb 9.6 oz)   SpO2 96%   BMI 22.75 kg/m²      Physical Exam  Vitals and nursing note reviewed.   Constitutional:       General: He is not in acute distress.     Appearance: Normal appearance. He is well-developed. He is not ill-appearing, toxic-appearing or diaphoretic.   HENT:      Head: Normocephalic and atraumatic.     Eyes:      Conjunctiva/sclera: Conjunctivae normal.       Cardiovascular:      Rate and Rhythm: Normal rate and regular rhythm.      Pulses: " Normal pulses.      Heart sounds: Normal heart sounds. No murmur heard.  Pulmonary:      Effort: Pulmonary effort is normal. No respiratory distress.      Breath sounds: Normal breath sounds. No wheezing or rales.   Abdominal:      General: There is no distension.      Palpations: Abdomen is soft.      Tenderness: There is no abdominal tenderness. There is no guarding.     Musculoskeletal:         General: No swelling.      Right shoulder: Normal.      Left shoulder: Tenderness present. No swelling or effusion. Normal strength. Normal pulse.      Right upper arm: Normal.      Left upper arm: Normal.      Right elbow: Normal.      Left elbow: Normal.      Right forearm: Normal.      Left forearm: Normal.      Right wrist: Normal.      Left wrist: Normal.        Arms:       Cervical back: Normal and neck supple.      Thoracic back: Normal.      Lumbar back: Tenderness present. No swelling, edema or bony tenderness. Normal range of motion. Negative right straight leg raise test and negative left straight leg raise test.      Right hip: Normal.      Left hip: Normal.      Right upper leg: Normal.      Left upper leg: Normal.      Comments: Palpable tenderness of LUE and left lateral paraspinal region. 5/5 strength throughout      Skin:     General: Skin is warm and dry.      Capillary Refill: Capillary refill takes less than 2 seconds.     Neurological:      Mental Status: He is alert.      Sensory: Sensation is intact.      Motor: Motor function is intact.      Coordination: Coordination is intact.      Gait: Gait is intact.      Deep Tendon Reflexes:      Reflex Scores:       Bicep reflexes are 2+ on the right side and 2+ on the left side.       Brachioradialis reflexes are 2+ on the right side and 2+ on the left side.       Patellar reflexes are 2+ on the right side and 2+ on the left side.    Psychiatric:         Mood and Affect: Mood normal.       Administrative Statements   I have spent a total time of 30 minutes  in caring for this patient on the day of the visit/encounter including Prognosis, Risks and benefits of tx options, Instructions for management, Patient and family education, Importance of tx compliance, Risk factor reductions, Impressions, Counseling / Coordination of care, and Documenting in the medical record.

## 2025-06-19 NOTE — TELEPHONE ENCOUNTER
PA for cyclobenzaprine (FLEXERIL) 5 mg tablet SUBMITTED to [facility]    via    []CMM-KEY:   [x]Surescripts-Case ID # [id number]   []Availity-Auth ID # NDC #   []Faxed to plan   []Other website   []Phone call Case ID #     [x]PA sent as URGENT    All office notes, labs and other pertaining documents and studies sent. Clinical questions answered. Awaiting determination from insurance company.     Turnaround time for your insurance to make a decision on your Prior Authorization can take 7-21 business days.

## 2025-06-20 ENCOUNTER — TELEPHONE (OUTPATIENT)
Dept: FAMILY MEDICINE CLINIC | Facility: CLINIC | Age: 85
End: 2025-06-20

## 2025-06-20 NOTE — TELEPHONE ENCOUNTER
PA for cyclobenzaprine (FLEXERIL) 5 mg tablet APPROVED     Date(s) approved 03/20/25-06/19/26    Case # [id number]    Patient advised by          [][employer] Message  [x]Phone call   [x]LMOM  []L/M to call office as no active Communication consent on file  []Unable to leave detailed message as VM not approved on Communication consent       Pharmacy advised by    [x]Fax  []Phone call  []Secure Chat       Approval letter scanned into Media Yes

## 2025-06-20 NOTE — TELEPHONE ENCOUNTER
Duplicate encounter created, please see telephone encounter from 06/19/25 regarding Cyclobenzaprine 5 mg tablet PA status. Please review patient's chart to see if there is already an encounter regarding the medication in question and to document anything regarding this medication in regards to anything regarding the authorization process etc before creating another encounter Thank You.

## 2025-06-21 ENCOUNTER — HOSPITAL ENCOUNTER (EMERGENCY)
Facility: HOSPITAL | Age: 85
Discharge: HOME | End: 2025-06-21
Attending: EMERGENCY MEDICINE
Payer: COMMERCIAL

## 2025-06-21 VITALS
OXYGEN SATURATION: 98 % | BODY MASS INDEX: 22.5 KG/M2 | HEART RATE: 89 BPM | DIASTOLIC BLOOD PRESSURE: 79 MMHG | WEIGHT: 148 LBS | RESPIRATION RATE: 18 BRPM | TEMPERATURE: 97.9 F | SYSTOLIC BLOOD PRESSURE: 138 MMHG

## 2025-06-21 DIAGNOSIS — M54.12 CERVICAL RADICULOPATHY: Primary | ICD-10-CM

## 2025-06-21 PROCEDURE — 99283 EMERGENCY DEPT VISIT LOW MDM: CPT

## 2025-06-21 PROCEDURE — 99284 EMERGENCY DEPT VISIT MOD MDM: CPT | Performed by: EMERGENCY MEDICINE

## 2025-06-21 RX ORDER — METHYLPREDNISOLONE 4 MG/1
4 TABLET ORAL SEE ADMIN INSTRUCTIONS
Qty: 21 TABLET | Refills: 0 | Status: SHIPPED | OUTPATIENT
Start: 2025-06-21 | End: 2025-06-24

## 2025-06-21 RX ORDER — OXYCODONE AND ACETAMINOPHEN 5; 325 MG/1; MG/1
1 TABLET ORAL ONCE
Refills: 0 | Status: COMPLETED | OUTPATIENT
Start: 2025-06-21 | End: 2025-06-21

## 2025-06-21 RX ORDER — OXYCODONE AND ACETAMINOPHEN 5; 325 MG/1; MG/1
1 TABLET ORAL EVERY 4 HOURS PRN
Qty: 15 TABLET | Refills: 0 | Status: SHIPPED | OUTPATIENT
Start: 2025-06-21 | End: 2025-07-01

## 2025-06-21 RX ADMIN — OXYCODONE HYDROCHLORIDE AND ACETAMINOPHEN 1 TABLET: 5; 325 TABLET ORAL at 09:12

## 2025-06-21 NOTE — ED PROVIDER NOTES
Time reflects when diagnosis was documented in both MDM as applicable and the Disposition within this note       Time User Action Codes Description Comment    6/21/2025  9:05 AM Clarke Hassan Add [M54.12] Cervical radiculopathy     6/21/2025  9:05 AM Clarke Hassan Modify [M54.12] Cervical radiculopathy acute exacerbation left side          ED Disposition       ED Disposition   Discharge    Condition   Stable    Date/Time   Sat Jun 21, 2025  9:05 AM    Comment   Patrick Frey discharge to home/self care.                   Assessment & Plan       Medical Decision Making  Diff includes exacerbation of chronic arthritis, cervical radiculopathy left side, less likely rotator cuff tear    NV intact, distribution of pain may be from cervical radiculopathy - will try course of steroid and referral to spine specialist    Risk  Prescription drug management.             Medications   oxyCODONE-acetaminophen (PERCOCET) 5-325 mg per tablet 1 tablet (1 tablet Oral Given 6/21/25 0912)       ED Risk Strat Scores                    No data recorded        SBIRT 22yo+      Flowsheet Row Most Recent Value   Initial Alcohol Screen: US AUDIT-C     1. How often do you have a drink containing alcohol? 0 Filed at: 06/21/2025 0806   2. How many drinks containing alcohol do you have on a typical day you are drinking?  0 Filed at: 06/21/2025 0806   3a. Male UNDER 65: How often do you have five or more drinks on one occasion? 0 Filed at: 06/21/2025 0806   3b. FEMALE Any Age, or MALE 65+: How often do you have 4 or more drinks on one occassion? 0 Filed at: 06/21/2025 0806   Audit-C Score 0 Filed at: 06/21/2025 0806   PEGGY: How many times in the past year have you...    Used an illegal drug or used a prescription medication for non-medical reasons? Never Filed at: 06/21/2025 0806                            History of Present Illness       Chief Complaint   Patient presents with    Back Pain     Upper back pain/shoulder x 2 weeks ago. Lifted heavy  object and been having pain since. Steroid shot in shoulder 3 days ago but still having pain       Past Medical History[1]   Past Surgical History[2]   Family History[3]   Social History[4]   E-Cigarette/Vaping    E-Cigarette Use Never User       E-Cigarette/Vaping Substances    Nicotine No     THC No     CBD No     Flavoring No     Other No     Unknown No       I have reviewed and agree with the history as documented.     Hx from patient 20 years of left upper back/ shoulder pain worse 2 weeks ago and really bad 3 days ago.  Primary care did steroid shot and tramadol.  Shot helped but face started swelling with the tramadol.  Stopped the tramadol.  Pain migrates to neck and down to rump on left even to left calf.  Constant pain worse with movement.  No numbness tingling, weakness.  No new injury.        Review of Systems   Constitutional:  Negative for chills and fever.   HENT:  Negative for rhinorrhea and sore throat.    Eyes:  Negative for pain, discharge and redness.   Respiratory:  Negative for shortness of breath.    Cardiovascular:  Negative for chest pain.   Gastrointestinal:  Negative for constipation, diarrhea, nausea and vomiting.   Genitourinary:  Negative for dysuria and frequency.   Musculoskeletal:  Positive for arthralgias and back pain.   Skin:  Negative for rash.   All other systems reviewed and are negative.          Objective       ED Triage Vitals   Temperature Pulse Blood Pressure Respirations SpO2 Patient Position - Orthostatic VS   06/21/25 0804 06/21/25 0804 06/21/25 0804 06/21/25 0804 06/21/25 0804 06/21/25 0804   97.9 °F (36.6 °C) 89 138/79 18 98 % Sitting      Temp Source Heart Rate Source BP Location FiO2 (%) Pain Score    06/21/25 0804 06/21/25 0804 06/21/25 0804 -- 06/21/25 0806    Temporal Monitor Left arm  9      Vitals      Date and Time Temp Pulse SpO2 Resp BP Pain Score FACES Pain Rating User   06/21/25 0912 -- -- -- -- -- 8 -- MC   06/21/25 0806 -- -- -- -- -- 9 -- ML   06/21/25  0804 97.9 °F (36.6 °C) 89 98 % 18 138/79 -- -- ML            Physical Exam  Vitals and nursing note reviewed.   Constitutional:       Appearance: He is well-developed.   HENT:      Head: Normocephalic and atraumatic.      Right Ear: External ear normal.      Left Ear: External ear normal.      Nose: Nose normal. No congestion.      Mouth/Throat:      Mouth: Mucous membranes are moist.      Pharynx: Oropharynx is clear.     Eyes:      General: Lids are normal. Lids are everted, no foreign bodies appreciated.      Extraocular Movements: Extraocular movements intact.      Conjunctiva/sclera: Conjunctivae normal.      Right eye: Right conjunctiva is not injected.      Left eye: Left conjunctiva is not injected.      Pupils: Pupils are equal, round, and reactive to light.      Slit lamp exam:     Right eye: No photophobia.      Left eye: No photophobia.       Cardiovascular:      Rate and Rhythm: Normal rate and regular rhythm.      Heart sounds: Normal heart sounds.   Pulmonary:      Effort: Pulmonary effort is normal. No respiratory distress.      Breath sounds: Normal breath sounds. No wheezing.   Abdominal:      General: Bowel sounds are normal. There is no distension.      Palpations: Abdomen is soft.      Tenderness: There is no abdominal tenderness.     Musculoskeletal:         General: No deformity. Normal range of motion.      Left shoulder: No swelling, deformity or tenderness. Normal range of motion. Normal strength. Normal pulse.      Cervical back: Normal range of motion and neck supple. Spasms and tenderness present. No bony tenderness. Pain with movement present. No spinous process tenderness. Normal range of motion.      Thoracic back: Spasms and tenderness present. No bony tenderness. Normal range of motion.      Lumbar back: No bony tenderness. Negative right straight leg raise test and negative left straight leg raise test.        Back:      Skin:     General: Skin is warm and dry.      Findings: No  rash.     Neurological:      General: No focal deficit present.      Mental Status: He is alert.      GCS: GCS eye subscore is 4. GCS verbal subscore is 5. GCS motor subscore is 6.      Sensory: Sensation is intact. No sensory deficit.      Motor: Motor function is intact.     Psychiatric:         Mood and Affect: Mood normal.         Results Reviewed       None            No orders to display       Procedures    ED Medication and Procedure Management   Prior to Admission Medications   Prescriptions Last Dose Informant Patient Reported? Taking?   Multiple Vitamins-Minerals (ZINC PO)  Self Yes No   Sig: Take by mouth every other day   Naproxen Sodium (Aleve) 220 MG CAPS  Self Yes No   Sig: Take by mouth if needed   cyclobenzaprine (FLEXERIL) 5 mg tablet   No No   Sig: Take 1 tablet (5 mg total) by mouth 3 (three) times a day as needed for muscle spasms   ezetimibe (ZETIA) 10 mg tablet  Self No No   Sig: Take 1 tablet (10 mg total) by mouth daily   lisinopril (ZESTRIL) 20 mg tablet  Self No No   Sig: Take 1 tablet (20 mg total) by mouth daily   methocarbamol (ROBAXIN) 500 mg tablet  Self No No   Sig: Take 1 tablet (500 mg total) by mouth 4 (four) times a day for 7 days   multivitamin (THERAGRAN) TABS  Self Yes No   Sig: Take 1 tablet by mouth in the morning.   naproxen (Naprosyn) 500 mg tablet  Self No No   Sig: Take 1 tablet (500 mg total) by mouth 2 (two) times a day with meals for 7 days   rosuvastatin (CRESTOR) 5 mg tablet  Self No No   Sig: Take 1 tablet (5 mg total) by mouth daily      Facility-Administered Medications: None     Discharge Medication List as of 6/21/2025  9:08 AM        START taking these medications    Details   methylprednisolone (MEDROL) 4 mg tablet Take 1 tablet (4 mg total) by mouth see administration instructions Medrol dose pack take as directed, Starting Sat 6/21/2025, Normal      oxyCODONE-acetaminophen (PERCOCET) 5-325 mg per tablet Take 1 tablet by mouth every 4 (four) hours as needed  for moderate pain for up to 10 days Max Daily Amount: 6 tablets, Starting Sat 6/21/2025, Until Tue 7/1/2025 at 2359, Normal           CONTINUE these medications which have NOT CHANGED    Details   cyclobenzaprine (FLEXERIL) 5 mg tablet Take 1 tablet (5 mg total) by mouth 3 (three) times a day as needed for muscle spasms, Starting Thu 6/19/2025, Normal      ezetimibe (ZETIA) 10 mg tablet Take 1 tablet (10 mg total) by mouth daily, Starting Thu 5/8/2025, Normal      lisinopril (ZESTRIL) 20 mg tablet Take 1 tablet (20 mg total) by mouth daily, Starting Fri 11/15/2024, Normal      methocarbamol (ROBAXIN) 500 mg tablet Take 1 tablet (500 mg total) by mouth 4 (four) times a day for 7 days, Starting Tue 6/17/2025, Until Tue 6/24/2025, Normal      Multiple Vitamins-Minerals (ZINC PO) Take by mouth every other day, Historical Med      multivitamin (THERAGRAN) TABS Take 1 tablet by mouth in the morning., Historical Med      naproxen (Naprosyn) 500 mg tablet Take 1 tablet (500 mg total) by mouth 2 (two) times a day with meals for 7 days, Starting Fri 5/16/2025, Until Fri 5/23/2025, Normal      Naproxen Sodium (Aleve) 220 MG CAPS Take by mouth if needed, Historical Med      rosuvastatin (CRESTOR) 5 mg tablet Take 1 tablet (5 mg total) by mouth daily, Starting Thu 5/8/2025, Normal             ED SEPSIS DOCUMENTATION   Time reflects when diagnosis was documented in both MDM as applicable and the Disposition within this note       Time User Action Codes Description Comment    6/21/2025  9:05 AM Clarke Hassan Add [M54.12] Cervical radiculopathy     6/21/2025  9:05 AM Clarke Hassan Modify [M54.12] Cervical radiculopathy acute exacerbation left side                   Clarke Hassan DO  06/21/25 1720         [1]   Past Medical History:  Diagnosis Date    Arthritis     Chronic sinusitis     Last Assessed:3/26/14    Colon polyp     Degeneration of cervical intervertebral disc     Last Assessed:3/26/14    Derangement of unspecified medial  meniscus due to old tear or injury, left knee     Last Assessed:4/10/14    Erythema migrans (Lyme disease)     Last Assessed:5/22/13    Eustachian tube dysfunction     Last Assessed:9/29/15    HL (hearing loss)     Hyperlipidemia     Hypertension     Low back pain     Lumbosacral disc disease     Osteoarthritis of wrist     Last Assessed:5/13/15    Primary osteoarthritis of left knee     Last Assessed:7/16/14    Spondylosis of cervical region without myelopathy or radiculopathy     Last Assessed:3/31/14    TMJ syndrome     Last Assessed:12/30/14    Trigger finger of left hand     Last Assessed:5/13/15    Trigger finger of right hand     Little finger, middle finger   [2]   Past Surgical History:  Procedure Laterality Date    CATARACT EXTRACTION      COLONOSCOPY      KNEE SURGERY Left     GA NEUROPLASTY &/TRANSPOS MEDIAN NRV CARPAL TUNNE Left 04/25/2019    Procedure: RELEASE CARPAL TUNNEL OPEN;  Surgeon: Carson Poole MD;  Location:  MAIN OR;  Service: Orthopedics    GA NEUROPLASTY &/TRANSPOS MEDIAN NRV CARPAL TUNNE Left 5/2/2025    Procedure: RELEASE CARPAL TUNNEL - Revision Left carpal tunnel release;  Surgeon: Radha Maher MD;  Location: [facility] OR;  Service: Orthopedics    GA NEUROPLASTY &/TRANSPOS MEDIAN NRV CARPAL TUNNE Right 5/13/2025    Procedure: RELEASE CARPAL TUNNEL- Revision Right carpal tunnel release, REMOVAL OF FOREIGN BODY RIGHT LONG FINGER;  Surgeon: Radha Maher MD;  Location: [facility] OR;  Service: Orthopedics    GA TENDON SHEATH INCISION Left 04/25/2019    Procedure: RELEASE TRIGGER FINGER - LEFT SMALL;  Surgeon: Carson Poole MD;  Location:  MAIN OR;  Service: Orthopedics    SHOULDER ARTHROSCOPY Bilateral     TONSILLECTOMY     [3]   Family History  Problem Relation Name Age of Onset    Heart disease Mother Dee Dee Frey         Coronary heart disease    Dementia Mother Dee Dee Frey     Heart disease Father Breezy Frey         Coronary heart disease    Lung  cancer Paternal Grandmother     [4]   Social History  Tobacco Use    Smoking status: Former     Types: Cigars     Start date:      Quit date:      Years since quittin.5     Passive exposure: Past    Smokeless tobacco: Never   Vaping Use    Vaping status: Never Used   Substance Use Topics    Alcohol use: Yes     Alcohol/week: 14.0 standard drinks of alcohol     Types: 14 Glasses of wine per week     Comment: Social drinker/Denies alcohol causing problems    Drug use: No        Clarke Hassan DO  [phone]

## 2025-06-23 ENCOUNTER — TELEPHONE (OUTPATIENT)
Dept: PHYSICAL THERAPY | Facility: OTHER | Age: 85
End: 2025-06-23

## 2025-06-23 NOTE — TELEPHONE ENCOUNTER
Call placed to the patient per [facility] referral.    After explanation of the program the patient is declining at this time. Patient states he has an appointment with his PCP tomorrow and will contact us if needed.    Referral Closed.

## 2025-06-24 ENCOUNTER — APPOINTMENT (OUTPATIENT)
Dept: RADIOLOGY | Facility: CLINIC | Age: 85
End: 2025-06-24
Payer: COMMERCIAL

## 2025-06-24 ENCOUNTER — APPOINTMENT (OUTPATIENT)
Dept: LAB | Facility: CLINIC | Age: 85
End: 2025-06-24
Payer: COMMERCIAL

## 2025-06-24 ENCOUNTER — OFFICE VISIT (OUTPATIENT)
Dept: FAMILY MEDICINE CLINIC | Facility: CLINIC | Age: 85
End: 2025-06-24
Payer: COMMERCIAL

## 2025-06-24 VITALS
SYSTOLIC BLOOD PRESSURE: 128 MMHG | WEIGHT: 143 LBS | DIASTOLIC BLOOD PRESSURE: 72 MMHG | RESPIRATION RATE: 17 BRPM | HEIGHT: 68 IN | OXYGEN SATURATION: 99 % | TEMPERATURE: 97.8 F | HEART RATE: 57 BPM | BODY MASS INDEX: 21.67 KG/M2

## 2025-06-24 DIAGNOSIS — M54.2 NECK PAIN: ICD-10-CM

## 2025-06-24 DIAGNOSIS — R61 NIGHT SWEATS: ICD-10-CM

## 2025-06-24 DIAGNOSIS — M25.50 POLYARTHRALGIA: ICD-10-CM

## 2025-06-24 DIAGNOSIS — M25.50 POLYARTHRALGIA: Primary | ICD-10-CM

## 2025-06-24 LAB
ALBUMIN SERPL BCG-MCNC: 4.4 G/DL (ref 3.5–5)
ALP SERPL-CCNC: 61 U/L (ref 34–104)
ALT SERPL W P-5'-P-CCNC: 36 U/L (ref 7–52)
ANION GAP SERPL CALCULATED.3IONS-SCNC: 11 MMOL/L (ref 4–13)
AST SERPL W P-5'-P-CCNC: 38 U/L (ref 13–39)
B BURGDOR IGG+IGM SER QL IA: NEGATIVE
BASOPHILS # BLD AUTO: 0.03 THOUSANDS/ÂΜL (ref 0–0.1)
BASOPHILS NFR BLD AUTO: 0 % (ref 0–1)
BILIRUB SERPL-MCNC: 0.45 MG/DL (ref 0.2–1)
BUN SERPL-MCNC: 26 MG/DL (ref 5–25)
CALCIUM SERPL-MCNC: 10.2 MG/DL (ref 8.4–10.2)
CHLORIDE SERPL-SCNC: 100 MMOL/L (ref 96–108)
CO2 SERPL-SCNC: 26 MMOL/L (ref 21–32)
CREAT SERPL-MCNC: 0.87 MG/DL (ref 0.6–1.3)
CRP SERPL QL: 40.3 MG/L
EOSINOPHIL # BLD AUTO: 0.01 THOUSAND/ÂΜL (ref 0–0.61)
EOSINOPHIL NFR BLD AUTO: 0 % (ref 0–6)
ERYTHROCYTE [DISTWIDTH] IN BLOOD BY AUTOMATED COUNT: 12.5 % (ref 11.6–15.1)
GFR SERPL CREATININE-BSD FRML MDRD: 80 ML/MIN/1.73SQ M
GLUCOSE P FAST SERPL-MCNC: 107 MG/DL (ref 65–99)
HCT VFR BLD AUTO: 44.3 % (ref 36.5–49.3)
HGB BLD-MCNC: 14 G/DL (ref 12–17)
IMM GRANULOCYTES # BLD AUTO: 0.15 THOUSAND/UL (ref 0–0.2)
IMM GRANULOCYTES NFR BLD AUTO: 1 % (ref 0–2)
LYMPHOCYTES # BLD AUTO: 1.63 THOUSANDS/ÂΜL (ref 0.6–4.47)
LYMPHOCYTES NFR BLD AUTO: 12 % (ref 14–44)
MCH RBC QN AUTO: 32 PG (ref 26.8–34.3)
MCHC RBC AUTO-ENTMCNC: 31.6 G/DL (ref 31.4–37.4)
MCV RBC AUTO: 101 FL (ref 82–98)
MONOCYTES # BLD AUTO: 1.05 THOUSAND/ÂΜL (ref 0.17–1.22)
MONOCYTES NFR BLD AUTO: 8 % (ref 4–12)
NEUTROPHILS # BLD AUTO: 10.88 THOUSANDS/ÂΜL (ref 1.85–7.62)
NEUTS SEG NFR BLD AUTO: 79 % (ref 43–75)
NRBC BLD AUTO-RTO: 0 /100 WBCS
PLATELET # BLD AUTO: 418 THOUSANDS/UL (ref 149–390)
PMV BLD AUTO: 10.3 FL (ref 8.9–12.7)
POTASSIUM SERPL-SCNC: 5.5 MMOL/L (ref 3.5–5.3)
PROT SERPL-MCNC: 8 G/DL (ref 6.4–8.4)
RBC # BLD AUTO: 4.38 MILLION/UL (ref 3.88–5.62)
SODIUM SERPL-SCNC: 137 MMOL/L (ref 135–147)
WBC # BLD AUTO: 13.75 THOUSAND/UL (ref 4.31–10.16)

## 2025-06-24 PROCEDURE — 86618 LYME DISEASE ANTIBODY: CPT

## 2025-06-24 PROCEDURE — G2211 COMPLEX E/M VISIT ADD ON: HCPCS | Performed by: FAMILY MEDICINE

## 2025-06-24 PROCEDURE — 99213 OFFICE O/P EST LOW 20 MIN: CPT | Performed by: FAMILY MEDICINE

## 2025-06-24 PROCEDURE — 85025 COMPLETE CBC W/AUTO DIFF WBC: CPT

## 2025-06-24 PROCEDURE — 86140 C-REACTIVE PROTEIN: CPT

## 2025-06-24 PROCEDURE — 72050 X-RAY EXAM NECK SPINE 4/5VWS: CPT

## 2025-06-24 PROCEDURE — 71046 X-RAY EXAM CHEST 2 VIEWS: CPT

## 2025-06-24 PROCEDURE — 36415 COLL VENOUS BLD VENIPUNCTURE: CPT

## 2025-06-24 PROCEDURE — 80053 COMPREHEN METABOLIC PANEL: CPT

## 2025-06-24 NOTE — PROGRESS NOTES
"Name: Patrick Frey      : 1940      MRN: [id number]  Encounter Provider: Raymon Marte DO  Encounter Date: 2025   Encounter department: [facility]  :  Assessment & Plan  Polyarthralgia    Orders:    Lyme Total AB W Reflex to IGM/IGG; Future    C-reactive protein; Future    CBC and differential; Future    Comprehensive metabolic panel; Future    Neck pain    Orders:    XR spine cervical complete 4 or 5 vw non injury; Future    Night sweats    Orders:    XR chest pa and lateral; Future    Etiologies to his symptoms are unclear  X-ray of his neck and chest ordered  Due to the night sweats and neck pain  Labs ordered to look out at any sort of systemic issue  Can follow-up after workup if needed       History of Present Illness   Patient resents today for follow-up of ER  Patient had an episode where he had joint swelling and pain and myalgias  Throughout his body  His knee swelled  He had significant neck pain  Shoulder pain  Today he feels great with no symptoms        Review of Systems   Constitutional: Negative.    HENT: Negative.     Eyes: Negative.    Respiratory: Negative.     Cardiovascular: Negative.    Gastrointestinal: Negative.    Endocrine: Negative.    Genitourinary: Negative.    Musculoskeletal: Negative.    Skin: Negative.    Allergic/Immunologic: Negative.    Neurological: Negative.    Hematological: Negative.    Psychiatric/Behavioral: Negative.     All other systems reviewed and are negative.      Objective   /72 (BP Location: Left arm, Patient Position: Sitting, Cuff Size: Large)   Pulse 57   Temp 97.8 °F (36.6 °C) (Tympanic)   Resp 17   Ht 5' 8\" (1.727 m)   Wt 64.9 kg (143 lb)   SpO2 99%   BMI 21.74 kg/m²      Physical Exam  Vitals and nursing note reviewed.   Constitutional:       Appearance: Normal appearance. He is well-developed.   HENT:      Head: Normocephalic.      Right Ear: External ear normal.      Left Ear: External ear normal.      " Nose: Nose normal.     Eyes:      Conjunctiva/sclera: Conjunctivae normal.      Pupils: Pupils are equal, round, and reactive to light.       Cardiovascular:      Rate and Rhythm: Normal rate and regular rhythm.      Heart sounds: Normal heart sounds.   Pulmonary:      Effort: Pulmonary effort is normal.      Breath sounds: Normal breath sounds.   Abdominal:      General: Bowel sounds are normal.      Palpations: Abdomen is soft.     Musculoskeletal:         General: Normal range of motion.      Cervical back: Normal range of motion and neck supple.     Skin:     General: Skin is warm and dry.     Neurological:      General: No focal deficit present.      Mental Status: He is alert and oriented to person, place, and time.     Psychiatric:         Behavior: Behavior normal.         Thought Content: Thought content normal.         Judgment: Judgment normal.

## 2025-07-17 ENCOUNTER — APPOINTMENT (OUTPATIENT)
Dept: LAB | Facility: CLINIC | Age: 85
End: 2025-07-17
Payer: COMMERCIAL

## 2025-07-17 DIAGNOSIS — R79.82 ELEVATED C-REACTIVE PROTEIN (CRP): ICD-10-CM

## 2025-07-17 DIAGNOSIS — D72.829 LEUKOCYTOSIS, UNSPECIFIED TYPE: ICD-10-CM

## 2025-07-17 DIAGNOSIS — M25.50 POLYARTHRALGIA: ICD-10-CM

## 2025-07-17 LAB
ALBUMIN SERPL BCG-MCNC: 4.2 G/DL (ref 3.5–5)
ALP SERPL-CCNC: 60 U/L (ref 34–104)
ALT SERPL W P-5'-P-CCNC: 20 U/L (ref 7–52)
ANION GAP SERPL CALCULATED.3IONS-SCNC: 6 MMOL/L (ref 4–13)
AST SERPL W P-5'-P-CCNC: 26 U/L (ref 13–39)
BASOPHILS # BLD AUTO: 0.05 THOUSANDS/ÂΜL (ref 0–0.1)
BASOPHILS NFR BLD AUTO: 1 % (ref 0–1)
BILIRUB SERPL-MCNC: 0.64 MG/DL (ref 0.2–1)
BUN SERPL-MCNC: 17 MG/DL (ref 5–25)
CALCIUM SERPL-MCNC: 9.7 MG/DL (ref 8.4–10.2)
CHLORIDE SERPL-SCNC: 103 MMOL/L (ref 96–108)
CO2 SERPL-SCNC: 30 MMOL/L (ref 21–32)
CREAT SERPL-MCNC: 0.72 MG/DL (ref 0.6–1.3)
CRP SERPL QL: 26.9 MG/L
EOSINOPHIL # BLD AUTO: 0.16 THOUSAND/ÂΜL (ref 0–0.61)
EOSINOPHIL NFR BLD AUTO: 2 % (ref 0–6)
ERYTHROCYTE [DISTWIDTH] IN BLOOD BY AUTOMATED COUNT: 13.4 % (ref 11.6–15.1)
GFR SERPL CREATININE-BSD FRML MDRD: 86 ML/MIN/1.73SQ M
GLUCOSE P FAST SERPL-MCNC: 81 MG/DL (ref 65–99)
HCT VFR BLD AUTO: 41.8 % (ref 36.5–49.3)
HGB BLD-MCNC: 13.5 G/DL (ref 12–17)
IMM GRANULOCYTES # BLD AUTO: 0.05 THOUSAND/UL (ref 0–0.2)
IMM GRANULOCYTES NFR BLD AUTO: 1 % (ref 0–2)
LYMPHOCYTES # BLD AUTO: 1.1 THOUSANDS/ÂΜL (ref 0.6–4.47)
LYMPHOCYTES NFR BLD AUTO: 15 % (ref 14–44)
MCH RBC QN AUTO: 32.8 PG (ref 26.8–34.3)
MCHC RBC AUTO-ENTMCNC: 32.3 G/DL (ref 31.4–37.4)
MCV RBC AUTO: 102 FL (ref 82–98)
MONOCYTES # BLD AUTO: 1.1 THOUSAND/ÂΜL (ref 0.17–1.22)
MONOCYTES NFR BLD AUTO: 15 % (ref 4–12)
NEUTROPHILS # BLD AUTO: 4.69 THOUSANDS/ÂΜL (ref 1.85–7.62)
NEUTS SEG NFR BLD AUTO: 66 % (ref 43–75)
NRBC BLD AUTO-RTO: 0 /100 WBCS
PLATELET # BLD AUTO: 218 THOUSANDS/UL (ref 149–390)
PMV BLD AUTO: 11.2 FL (ref 8.9–12.7)
POTASSIUM SERPL-SCNC: 4.6 MMOL/L (ref 3.5–5.3)
PROT SERPL-MCNC: 7.2 G/DL (ref 6.4–8.4)
RBC # BLD AUTO: 4.12 MILLION/UL (ref 3.88–5.62)
SODIUM SERPL-SCNC: 139 MMOL/L (ref 135–147)
WBC # BLD AUTO: 7.15 THOUSAND/UL (ref 4.31–10.16)

## 2025-07-17 PROCEDURE — 36415 COLL VENOUS BLD VENIPUNCTURE: CPT

## 2025-07-17 PROCEDURE — 86618 LYME DISEASE ANTIBODY: CPT

## 2025-07-17 PROCEDURE — 86140 C-REACTIVE PROTEIN: CPT

## 2025-07-17 PROCEDURE — 80053 COMPREHEN METABOLIC PANEL: CPT

## 2025-07-17 PROCEDURE — 85025 COMPLETE CBC W/AUTO DIFF WBC: CPT

## 2025-07-18 LAB — B BURGDOR IGG+IGM SER QL IA: NEGATIVE

## 2025-08-21 DIAGNOSIS — T78.40XA ALLERGIC REACTION, INITIAL ENCOUNTER: Primary | ICD-10-CM

## 2025-08-21 RX ORDER — PREDNISONE 10 MG/1
TABLET ORAL
Qty: 21 TABLET | Refills: 0 | Status: SHIPPED | OUTPATIENT
Start: 2025-08-21

## (undated) DEVICE — NEEDLE 25G X 1 1/2

## (undated) DEVICE — CHLORAPREP HI-LITE 26ML ORANGE

## (undated) DEVICE — CUFF TOURNIQUET 18 X 4 IN QUICK CONNECT DISP 1 BLADDER

## (undated) DEVICE — STERILE BETHLEHEM PLASTIC HAND: Brand: CARDINAL HEALTH

## (undated) DEVICE — SUT ETHILON 4-0 P-S 18 IN 699H

## (undated) DEVICE — GAUZE SPONGES,16 PLY: Brand: CURITY

## (undated) DEVICE — CAST PADDING 4 IN SYNTHETIC NON-STRL

## (undated) DEVICE — GLOVE INDICATOR PI UNDERGLOVE SZ 8 BLUE

## (undated) DEVICE — NEEDLE 18 G X 1 1/2

## (undated) DEVICE — GLOVE INDICATOR PI UNDERGLOVE SZ 7 BLUE

## (undated) DEVICE — GLOVE SRG BIOGEL 7

## (undated) DEVICE — SYRINGE 10ML LL

## (undated) DEVICE — ANTIBACTERIAL UNDYED BRAIDED (POLYGLACTIN 910), SYNTHETIC ABSORBABLE SUTURE: Brand: COATED VICRYL

## (undated) DEVICE — ACE WRAP 3 IN UNSTERILE

## (undated) DEVICE — PADDING,UNDERCAST,COTTON, 4"X4YD STERILE: Brand: MEDLINE

## (undated) DEVICE — SPLINT 4 X 15 IN FAST SET PLASTER

## (undated) DEVICE — INTENDED FOR TISSUE SEPARATION, AND OTHER PROCEDURES THAT REQUIRE A SHARP SURGICAL BLADE TO PUNCTURE OR CUT.: Brand: BARD-PARKER ® CARBON RIB-BACK BLADES

## (undated) DEVICE — ACE WRAP 3 IN VELCRO LATEX FREE

## (undated) DEVICE — KERLIX BANDAGE ROLL: Brand: KERLIX

## (undated) DEVICE — OCCLUSIVE GAUZE STRIP,3% BISMUTH TRIBROMOPHENATE IN PETROLATUM BLEND: Brand: XEROFORM

## (undated) DEVICE — INTENDED FOR TISSUE SEPARATION, AND OTHER PROCEDURES THAT REQUIRE A SHARP SURGICAL BLADE TO PUNCTURE OR CUT.: Brand: BARD-PARKER SAFETY BLADES SIZE 15, STERILE

## (undated) DEVICE — GLOVE SRG BIOGEL 7.5

## (undated) DEVICE — STERI DRAPE 1000 NON-STERILE ROLL

## (undated) DEVICE — SPONGE PVP SCRUB WING STERILE